# Patient Record
Sex: MALE | Race: WHITE | NOT HISPANIC OR LATINO | Employment: OTHER | URBAN - METROPOLITAN AREA
[De-identification: names, ages, dates, MRNs, and addresses within clinical notes are randomized per-mention and may not be internally consistent; named-entity substitution may affect disease eponyms.]

---

## 2019-11-01 ENCOUNTER — OFFICE VISIT (OUTPATIENT)
Dept: FAMILY MEDICINE | Facility: CLINIC | Age: 79
End: 2019-11-01
Payer: MEDICARE

## 2019-11-01 VITALS
HEIGHT: 65 IN | DIASTOLIC BLOOD PRESSURE: 80 MMHG | WEIGHT: 156.06 LBS | TEMPERATURE: 99 F | HEART RATE: 74 BPM | OXYGEN SATURATION: 98 % | SYSTOLIC BLOOD PRESSURE: 138 MMHG | BODY MASS INDEX: 26 KG/M2

## 2019-11-01 DIAGNOSIS — I25.10 ARTERIOSCLEROSIS OF CORONARY ARTERY: ICD-10-CM

## 2019-11-01 DIAGNOSIS — I10 ESSENTIAL HYPERTENSION: Primary | ICD-10-CM

## 2019-11-01 DIAGNOSIS — G89.29 CHRONIC BILATERAL LOW BACK PAIN WITHOUT SCIATICA: ICD-10-CM

## 2019-11-01 DIAGNOSIS — R41.3 MEMORY LOSS: ICD-10-CM

## 2019-11-01 DIAGNOSIS — F41.9 ANXIETY: ICD-10-CM

## 2019-11-01 DIAGNOSIS — E78.2 MIXED HYPERLIPIDEMIA: ICD-10-CM

## 2019-11-01 DIAGNOSIS — M54.50 CHRONIC BILATERAL LOW BACK PAIN WITHOUT SCIATICA: ICD-10-CM

## 2019-11-01 PROBLEM — M54.9 CHRONIC BILATERAL BACK PAIN: Status: ACTIVE | Noted: 2019-11-01

## 2019-11-01 PROBLEM — E78.5 HYPERLIPIDEMIA: Status: ACTIVE | Noted: 2019-11-01

## 2019-11-01 PROBLEM — C61 CARCINOMA OF PROSTATE: Status: ACTIVE | Noted: 2019-11-01

## 2019-11-01 PROCEDURE — 99214 PR OFFICE/OUTPT VISIT, EST, LEVL IV, 30-39 MIN: ICD-10-PCS | Mod: HCNC,S$GLB,, | Performed by: INTERNAL MEDICINE

## 2019-11-01 PROCEDURE — 99999 PR PBB SHADOW E&M-NEW PATIENT-LVL III: CPT | Mod: PBBFAC,HCNC,, | Performed by: INTERNAL MEDICINE

## 2019-11-01 PROCEDURE — 1101F PT FALLS ASSESS-DOCD LE1/YR: CPT | Mod: HCNC,CPTII,S$GLB, | Performed by: INTERNAL MEDICINE

## 2019-11-01 PROCEDURE — 99214 OFFICE O/P EST MOD 30 MIN: CPT | Mod: HCNC,S$GLB,, | Performed by: INTERNAL MEDICINE

## 2019-11-01 PROCEDURE — 99999 PR PBB SHADOW E&M-NEW PATIENT-LVL III: ICD-10-PCS | Mod: PBBFAC,HCNC,, | Performed by: INTERNAL MEDICINE

## 2019-11-01 PROCEDURE — 1101F PR PT FALLS ASSESS DOC 0-1 FALLS W/OUT INJ PAST YR: ICD-10-PCS | Mod: HCNC,CPTII,S$GLB, | Performed by: INTERNAL MEDICINE

## 2019-11-01 RX ORDER — TAMSULOSIN HYDROCHLORIDE 0.4 MG/1
CAPSULE ORAL
Refills: 5 | COMMUNITY
Start: 2019-10-28 | End: 2020-08-03 | Stop reason: SDUPTHER

## 2019-11-01 RX ORDER — IBUPROFEN 100 MG/5ML
1000 SUSPENSION, ORAL (FINAL DOSE FORM) ORAL
COMMUNITY
Start: 2011-06-23 | End: 2023-06-29 | Stop reason: ALTCHOICE

## 2019-11-01 RX ORDER — ESCITALOPRAM OXALATE 20 MG/1
20 TABLET ORAL DAILY
Refills: 3 | COMMUNITY
Start: 2019-09-14 | End: 2020-06-02 | Stop reason: SDUPTHER

## 2019-11-01 RX ORDER — LORAZEPAM 0.5 MG/1
0.5 TABLET ORAL 2 TIMES DAILY PRN
Qty: 60 TABLET | Refills: 5 | Status: SHIPPED | OUTPATIENT
Start: 2019-11-01 | End: 2020-04-29 | Stop reason: SDUPTHER

## 2019-11-01 RX ORDER — ASPIRIN 81 MG/1
81 TABLET ORAL DAILY
Refills: 0
Start: 2019-11-01 | End: 2021-02-24 | Stop reason: SDUPTHER

## 2019-11-01 RX ORDER — EPINEPHRINE 0.22MG
100 AEROSOL WITH ADAPTER (ML) INHALATION
COMMUNITY
Start: 2013-01-17 | End: 2023-06-29 | Stop reason: ALTCHOICE

## 2019-11-01 RX ORDER — CHOLECALCIFEROL (VITAMIN D3) 25 MCG
1000 TABLET ORAL
COMMUNITY
Start: 2014-01-22 | End: 2021-08-18 | Stop reason: SDUPTHER

## 2019-11-01 RX ORDER — RAMIPRIL 5 MG/1
CAPSULE ORAL
Refills: 3 | COMMUNITY
Start: 2019-09-09 | End: 2021-02-24

## 2019-11-01 RX ORDER — LORAZEPAM 0.5 MG/1
TABLET ORAL
Refills: 0 | COMMUNITY
Start: 2019-09-13 | End: 2019-11-01 | Stop reason: SDUPTHER

## 2019-11-01 RX ORDER — FUROSEMIDE 20 MG/1
TABLET ORAL
Refills: 1 | COMMUNITY
Start: 2019-09-13 | End: 2020-03-18 | Stop reason: SDUPTHER

## 2019-11-01 RX ORDER — BUPROPION HYDROCHLORIDE 200 MG/1
200 TABLET, EXTENDED RELEASE ORAL 2 TIMES DAILY
Refills: 4 | COMMUNITY
Start: 2019-10-28 | End: 2022-03-02 | Stop reason: SDUPTHER

## 2019-11-01 NOTE — ASSESSMENT & PLAN NOTE
Has been having some anxiety lately.  On Wellbutrin 200 mg daily, Lexapro 20 mg daily, Lorazepam PRN.  No SI/HI/panic attacks.

## 2019-11-01 NOTE — ASSESSMENT & PLAN NOTE
On ASA and statin.  No CP/SOB.  Sees Dr. Lawton at [facility].  Cut down to 5 mg on Ramipril by her at last visit 2/2 dizziness.

## 2019-11-01 NOTE — ASSESSMENT & PLAN NOTE
Just had MRI L spine on 10/1/19 with Dr. Jimenez.  Also has some left leg weakness.  Just finished PT which helped some.

## 2019-11-01 NOTE — PROGRESS NOTES
[facility] Note    Chief Complaint      Chief Complaint   Patient presents with    Establish Care     History of Present Illness      Kenyon Hunt is a 79 y.o. male who presents today to establish care for depression, HTN.  Patient comes to appointment with wife, Bryanna.    Problem List Items Addressed This Visit     Essential hypertension - Primary    Current Assessment & Plan     Stable on ramipril 5 mg and lasix 20 mg daily, no CP/SOB/HA         Relevant Orders    CBC auto differential    Comprehensive metabolic panel    Chronic bilateral back pain    Current Assessment & Plan     Just had MRI L spine on 10/1/19 with Dr. Jimenez.  Also has some left leg weakness.  Just finished PT which helped some.          Anxiety    Current Assessment & Plan     Has been having some anxiety lately.  On Wellbutrin 200 mg daily, Lexapro 20 mg daily, Lorazepam PRN.  No SI/HI/panic attacks.         Arteriosclerosis of coronary artery    Current Assessment & Plan     On ASA and statin.  No CP/SOB.  Sees Dr. Lawton at .  Cut down to 5 mg on Ramipril by her at last visit 2/2 dizziness.           Hyperlipidemia    Current Assessment & Plan     Stable on lipitor, no myalgias         Relevant Orders    Lipid panel      Other Visit Diagnoses     Memory loss        Relevant Orders    Ambulatory referral to Neurology          Health Maintenance   Topic Date Due    TETANUS VACCINE  11/01/2020 (Originally 9/29/1958)    Aspirin/Antiplatelet Therapy  11/01/2020    Lipid Panel  01/28/2024    Pneumococcal Vaccine (65+ High/Highest Risk)  Completed       No past medical history on file.    No past surgical history on file.    family history is not on file.     Social History     Tobacco Use    Smoking status: Former Smoker   Substance Use Topics    Alcohol use: Yes    Drug use: Not on file       Review of Systems   Constitutional: Negative for chills and fever.   HENT: Negative for congestion and  sore throat.    Eyes: Negative for blurred vision and discharge.   Respiratory: Negative for cough and shortness of breath.    Cardiovascular: Negative for chest pain and palpitations.   Gastrointestinal: Negative for constipation, diarrhea, nausea and vomiting.   Genitourinary: Negative for dysuria and hematuria.   Musculoskeletal: Negative for falls and myalgias.   Skin: Negative for itching and rash.   Neurological: Negative for dizziness and headaches.        Outpatient Encounter Medications as of 11/1/2019   Medication Sig Dispense Refill    ascorbic acid, vitamin C, (VITAMIN C) 1000 MG tablet Take 1,000 mg by mouth.      atorvastatin calcium (LIPITOR ORAL) Take by mouth.      buPROPion (WELLBUTRIN SR) 200 MG SR12 Take 200 mg by mouth 2 (two) times daily.  4    coenzyme Q10 100 mg capsule Take 100 mg by mouth.      escitalopram oxalate (LEXAPRO) 20 MG tablet Take 20 mg by mouth once daily.  3    furosemide (LASIX) 20 MG tablet TAKE 1 TO 2 TABLETS BY MOUTH ONCE DAILY AS NEEDED  1    LORazepam (ATIVAN) 0.5 MG tablet Take 1 tablet (0.5 mg total) by mouth 2 (two) times daily as needed for Anxiety. 60 tablet 5    MILK THISTLE ORAL Take by mouth.      multivit with minerals/lutein (MULTIVITAMIN 50 PLUS ORAL) Take by mouth.      ramipril (ALTACE) 5 MG capsule TK 1 C PO D  3    tamsulosin (FLOMAX) 0.4 mg Cap TK 1 C PO QD  5    TURMERIC ORAL Take by mouth.      vitamin D (VITAMIN D3) 1000 units Tab Take 1,000 Int'l Units by mouth.      [DISCONTINUED] LORazepam (ATIVAN) 0.5 MG tablet TK 1 T PO BID PRN FOR ANXIETY  0    aspirin (ECOTRIN) 81 MG EC tablet Take 1 tablet (81 mg total) by mouth once daily.  0     No facility-administered encounter medications on file as of 11/1/2019.        Review of patient's allergies indicates:   Allergen Reactions    Carbamazepine        Physical Exam      Vital Signs  Temp: 98.7 °F (37.1 °C)  Temp src: Oral  Pulse: 74  SpO2: 98 %  BP: (!) 146/80  BP Location: Left  "arm  Patient Position: Sitting  Height and Weight  Height: 5' 5" (165.1 cm)  Weight: 70.8 kg (156 lb 1.4 oz)  BSA (Calculated - sq m): 1.8 sq meters  BMI (Calculated): 26  Weight in (lb) to have BMI = 25: 149.9]    Physical Exam   Constitutional: He is oriented to person, place, and time. He appears well-developed and well-nourished.   HENT:   Head: Normocephalic and atraumatic.   Right Ear: External ear normal.   Left Ear: External ear normal.   Eyes: Right eye exhibits no discharge. Left eye exhibits no discharge.   Neck: Normal range of motion. No thyromegaly present.   Cardiovascular: Normal rate, regular rhythm and intact distal pulses.   No murmur heard.  Pulmonary/Chest: Effort normal and breath sounds normal. No respiratory distress.   Abdominal: Soft. Bowel sounds are normal. He exhibits no distension. There is no tenderness.   Musculoskeletal: Normal range of motion. He exhibits no deformity.   Neurological: He is alert and oriented to person, place, and time.   Skin: Skin is warm and dry. No rash noted.   Psychiatric: He has a normal mood and affect. His behavior is normal.        Laboratory:  CBC:  No results for input(s): WBC, RBC, HGB, HCT, PLT, MCV, MCH, MCHC in the last 2160 hours.  CMP:  No results for input(s): GLU, CALCIUM, ALBUMIN, PROT, NA, K, CO2, CL, BUN, ALKPHOS, ALT, AST, BILITOT in the last 2160 hours.    Invalid input(s): CREATININ  URINALYSIS:  No results for input(s): COLORU, CLARITYU, SPECGRAV, PHUR, PROTEINUA, GLUCOSEU, BILIRUBINCON, BLOODU, WBCU, RBCU, BACTERIA, MUCUS, NITRITE, LEUKOCYTESUR, UROBILINOGEN, HYALINECASTS in the last 2160 hours.   LIPIDS:  No results for input(s): TSH, HDL, CHOL, TRIG, LDLCALC, CHOLHDL, NONHDLCHOL, TOTALCHOLEST in the last 2160 hours.  TSH:  No results for input(s): TSH in the last 2160 hours.  A1C:  No results for input(s): HGBA1C in the last 2160 hours.    Radiology:  10/1/19 MRI L spine without contrast at [facility] ordered by Dr. Jimenez:  Lumbar " spondylosis at multiple levels.  Post op changes from L3-S1.  No significant central canal  Stenosis.  Root impingement at L3-L4 on left and L4-S1 bilaterally.    Assessment/Plan     Kenyon Hunt is a 79 y.o.male with:    1. Essential hypertension  - CBC auto differential; Future  - Comprehensive metabolic panel; Future  - CBC auto differential  - Comprehensive metabolic panel    2. Mixed hyperlipidemia  - Lipid panel; Future  - Lipid panel    3. Anxiety    4. Arteriosclerosis of coronary artery    5. Chronic bilateral low back pain without sciatica    6. Memory loss  - Ambulatory referral to Neurology    -labs [facility] after 1/29/19, due for annual labs  -Continue current medications and maintain follow up with specialists.  Return to clinic in 6 months.       Italia Ramírez MD  [facility] [place]

## 2019-11-05 ENCOUNTER — TELEPHONE (OUTPATIENT)
Dept: FAMILY MEDICINE | Facility: CLINIC | Age: 79
End: 2019-11-05

## 2019-12-05 ENCOUNTER — TELEPHONE (OUTPATIENT)
Dept: FAMILY MEDICINE | Facility: CLINIC | Age: 79
End: 2019-12-05

## 2019-12-05 NOTE — TELEPHONE ENCOUNTER
----- Message from Don Carrasco sent at 12/5/2019  1:09 PM CST -----  Contact: [phone] / [facility]  Patient states he is supposed to have an appointment scheduled in January, but it is not showing. Please Advise.

## 2019-12-30 ENCOUNTER — TELEPHONE (OUTPATIENT)
Dept: FAMILY MEDICINE | Facility: CLINIC | Age: 79
End: 2019-12-30

## 2019-12-30 NOTE — TELEPHONE ENCOUNTER
----- Message from Mary Jane Ribeiro sent at 12/30/2019  4:09 PM CST -----  Contact: Sneha honeycutt/ PEDRO [phone]  Sneha is requesting EKG report be faxed to office for pt's upcoming surgery.  [phone]. Please advise.

## 2020-01-14 ENCOUNTER — TELEPHONE (OUTPATIENT)
Dept: FAMILY MEDICINE | Facility: CLINIC | Age: 80
End: 2020-01-14

## 2020-01-14 RX ORDER — FLUTICASONE PROPIONATE 50 MCG
1 SPRAY, SUSPENSION (ML) NASAL DAILY
COMMUNITY
End: 2020-01-14 | Stop reason: SDUPTHER

## 2020-01-14 NOTE — TELEPHONE ENCOUNTER
----- Message from Daxa Becerra sent at 1/14/2020 11:27 AM CST -----  Patient call regarding needing refills on , generic brand of(  Flonase) spray, please call him about this matter , says if have any question # [phone]. Thanks

## 2020-01-15 RX ORDER — FLUTICASONE PROPIONATE 50 MCG
1 SPRAY, SUSPENSION (ML) NASAL DAILY
Qty: 1 BOTTLE | Refills: 3 | Status: SHIPPED | OUTPATIENT
Start: 2020-01-15 | End: 2021-01-22

## 2020-01-15 RX ORDER — FLUTICASONE PROPIONATE 50 MCG
1 SPRAY, SUSPENSION (ML) NASAL DAILY
Qty: 1 BOTTLE | Refills: 3 | Status: SHIPPED | OUTPATIENT
Start: 2020-01-15 | End: 2020-01-15 | Stop reason: SDUPTHER

## 2020-01-15 NOTE — TELEPHONE ENCOUNTER
Received medical clearance for shoulder surgery with Dr. Gay.  Please let patient know that I completed the form and will fax back to Dr. Gay's office.  PLEASE LET HIM KNOW TO HOLD HIS ASPIRIN UNTIL PROCEDURE. (5 DAYS)

## 2020-01-15 NOTE — TELEPHONE ENCOUNTER
----- Message from Rachael Alfaro sent at 1/15/2020 11:05 AM CST -----  Contact: pt  Pt would like to be called back wants medication sent to [facility] mail order [phone]  Pt can be reached at [phone]

## 2020-02-20 DIAGNOSIS — N17.9 AKI (ACUTE KIDNEY INJURY): Primary | ICD-10-CM

## 2020-02-20 LAB
ALBUMIN SERPL-MCNC: 4.3 G/DL (ref 3.6–5.1)
ALBUMIN/GLOB SERPL: 1.7 (CALC) (ref 1–2.5)
ALP SERPL-CCNC: 53 U/L (ref 35–144)
ALT SERPL-CCNC: 24 U/L (ref 9–46)
AST SERPL-CCNC: 18 U/L (ref 10–35)
BASOPHILS # BLD AUTO: 62 CELLS/UL (ref 0–200)
BASOPHILS NFR BLD AUTO: 0.8 %
BILIRUB SERPL-MCNC: 0.4 MG/DL (ref 0.2–1.2)
BUN SERPL-MCNC: 23 MG/DL (ref 7–25)
BUN/CREAT SERPL: 19 (CALC) (ref 6–22)
CALCIUM SERPL-MCNC: 10.1 MG/DL (ref 8.6–10.3)
CHLORIDE SERPL-SCNC: 103 MMOL/L (ref 98–110)
CHOLEST SERPL-MCNC: 176 MG/DL
CHOLEST/HDLC SERPL: 2.3 (CALC)
CO2 SERPL-SCNC: 31 MMOL/L (ref 20–32)
CREAT SERPL-MCNC: 1.2 MG/DL (ref 0.7–1.11)
EOSINOPHIL # BLD AUTO: 367 CELLS/UL (ref 15–500)
EOSINOPHIL NFR BLD AUTO: 4.7 %
ERYTHROCYTE [DISTWIDTH] IN BLOOD BY AUTOMATED COUNT: 11.9 % (ref 11–15)
GFRSERPLBLD MDRD-ARVRAT: 56 ML/MIN/1.73M2
GLOBULIN SER CALC-MCNC: 2.6 G/DL (CALC) (ref 1.9–3.7)
GLUCOSE SERPL-MCNC: 92 MG/DL (ref 65–99)
HCT VFR BLD AUTO: 40.5 % (ref 38.5–50)
HDLC SERPL-MCNC: 78 MG/DL
HGB BLD-MCNC: 13.7 G/DL (ref 13.2–17.1)
LDLC SERPL CALC-MCNC: 83 MG/DL (CALC)
LYMPHOCYTES # BLD AUTO: 1817 CELLS/UL (ref 850–3900)
LYMPHOCYTES NFR BLD AUTO: 23.3 %
MCH RBC QN AUTO: 32.1 PG (ref 27–33)
MCHC RBC AUTO-ENTMCNC: 33.8 G/DL (ref 32–36)
MCV RBC AUTO: 94.8 FL (ref 80–100)
MONOCYTES # BLD AUTO: 601 CELLS/UL (ref 200–950)
MONOCYTES NFR BLD AUTO: 7.7 %
NEUTROPHILS # BLD AUTO: 4953 CELLS/UL (ref 1500–7800)
NEUTROPHILS NFR BLD AUTO: 63.5 %
NONHDLC SERPL-MCNC: 98 MG/DL (CALC)
PLATELET # BLD AUTO: 243 THOUSAND/UL (ref 140–400)
PMV BLD REES-ECKER: 10.5 FL (ref 7.5–12.5)
POTASSIUM SERPL-SCNC: 4.2 MMOL/L (ref 3.5–5.3)
PROT SERPL-MCNC: 6.9 G/DL (ref 6.1–8.1)
RBC # BLD AUTO: 4.27 MILLION/UL (ref 4.2–5.8)
SODIUM SERPL-SCNC: 141 MMOL/L (ref 135–146)
TRIGL SERPL-MCNC: 71 MG/DL
WBC # BLD AUTO: 7.8 THOUSAND/UL (ref 3.8–10.8)

## 2020-02-20 NOTE — PROGRESS NOTES
Labs look great but kidney function is lower compared to last labs at .  Would like to repeat in 1 week to see if this is a trend or if it has improved.  In the meantime, drink lots of water.  Will send order to [facility].

## 2020-02-21 ENCOUNTER — TELEPHONE (OUTPATIENT)
Dept: FAMILY MEDICINE | Facility: CLINIC | Age: 80
End: 2020-02-21

## 2020-02-21 NOTE — TELEPHONE ENCOUNTER
Spoke with patient. Informed patient of recent lab results and Dr Ramírez's recommendations. Pt verbalizes understanding.

## 2020-02-26 ENCOUNTER — TELEPHONE (OUTPATIENT)
Dept: FAMILY MEDICINE | Facility: CLINIC | Age: 80
End: 2020-02-26

## 2020-02-26 NOTE — TELEPHONE ENCOUNTER
----- Message from Inna Farr sent at 2/26/2020  9:15 AM CST -----  Contact: Patient  Patient called requesting information on orders for blood work that he was advised to retake for kidney functioning. Pt would like to know if the labs are fasting. If possible, can you please advise patient? Pt stated he can be reached on [phone].  
Lm letting pt know he does not have to fast for bloodwork  
no

## 2020-02-27 ENCOUNTER — TELEPHONE (OUTPATIENT)
Dept: FAMILY MEDICINE | Facility: CLINIC | Age: 80
End: 2020-02-27

## 2020-02-27 LAB
ALBUMIN SERPL-MCNC: 4.1 G/DL (ref 3.6–5.1)
ALBUMIN/GLOB SERPL: 1.5 (CALC) (ref 1–2.5)
ALP SERPL-CCNC: 54 U/L (ref 35–144)
ALT SERPL-CCNC: 17 U/L (ref 9–46)
AST SERPL-CCNC: 15 U/L (ref 10–35)
BILIRUB SERPL-MCNC: 0.4 MG/DL (ref 0.2–1.2)
BUN SERPL-MCNC: 23 MG/DL (ref 7–25)
BUN/CREAT SERPL: 20 (CALC) (ref 6–22)
CALCIUM SERPL-MCNC: 9.7 MG/DL (ref 8.6–10.3)
CHLORIDE SERPL-SCNC: 103 MMOL/L (ref 98–110)
CO2 SERPL-SCNC: 28 MMOL/L (ref 20–32)
CREAT SERPL-MCNC: 1.14 MG/DL (ref 0.7–1.11)
GFRSERPLBLD MDRD-ARVRAT: 60 ML/MIN/1.73M2
GLOBULIN SER CALC-MCNC: 2.7 G/DL (CALC) (ref 1.9–3.7)
GLUCOSE SERPL-MCNC: 76 MG/DL (ref 65–139)
POTASSIUM SERPL-SCNC: 4.3 MMOL/L (ref 3.5–5.3)
PROT SERPL-MCNC: 6.8 G/DL (ref 6.1–8.1)
SODIUM SERPL-SCNC: 140 MMOL/L (ref 135–146)

## 2020-02-27 NOTE — TELEPHONE ENCOUNTER
----- Message from Italia Ramírez MD sent at 2/27/2020 11:32 AM CST -----  Kidney function slightly improved from before.  Will repeat at next visit.  Be sure to continue drinking lots of water

## 2020-02-27 NOTE — PROGRESS NOTES
Kidney function slightly improved from before.  Will repeat at next visit.  Be sure to continue drinking lots of water

## 2020-03-18 ENCOUNTER — TELEPHONE (OUTPATIENT)
Dept: FAMILY MEDICINE | Facility: CLINIC | Age: 80
End: 2020-03-18

## 2020-03-18 RX ORDER — FUROSEMIDE 20 MG/1
20 TABLET ORAL DAILY
Qty: 90 TABLET | Refills: 1 | Status: SHIPPED | OUTPATIENT
Start: 2020-03-18 | End: 2020-08-03

## 2020-03-18 RX ORDER — ATORVASTATIN CALCIUM 10 MG/1
10 TABLET, FILM COATED ORAL DAILY
Qty: 90 TABLET | Refills: 1 | Status: SHIPPED | OUTPATIENT
Start: 2020-03-18 | End: 2021-02-22 | Stop reason: SDUPTHER

## 2020-03-18 NOTE — TELEPHONE ENCOUNTER
----- Message from Felicita Burgos sent at 3/18/2020 11:48 AM CDT -----  Contact: [phone]  Type: Rx    Name of medication(s): furosemide (LASIX) 20 MG tablet,atorvastatin calcium (LIPITOR ORAL     Is this a refill? New rx? Refill (90) day supply    Who prescribed medication? Dr. Italia Ramírez    Pharmacy Name, Phone, & Location[id number] [place] [address]     Comments:    Pleased advise, thank you

## 2020-03-18 NOTE — TELEPHONE ENCOUNTER
----- Message from Raquel Talbot sent at 3/18/2020  1:24 PM CDT -----  Pt asked for another call back.      Thanks

## 2020-04-29 RX ORDER — LORAZEPAM 0.5 MG/1
0.5 TABLET ORAL 2 TIMES DAILY PRN
Qty: 60 TABLET | Refills: 5 | Status: SHIPPED | OUTPATIENT
Start: 2020-04-29 | End: 2020-11-16

## 2020-05-04 ENCOUNTER — OFFICE VISIT (OUTPATIENT)
Dept: FAMILY MEDICINE | Facility: CLINIC | Age: 80
End: 2020-05-04
Payer: MEDICARE

## 2020-05-04 ENCOUNTER — TELEPHONE (OUTPATIENT)
Dept: FAMILY MEDICINE | Facility: CLINIC | Age: 80
End: 2020-05-04

## 2020-05-04 DIAGNOSIS — F41.9 ANXIETY: ICD-10-CM

## 2020-05-04 DIAGNOSIS — I10 ESSENTIAL HYPERTENSION: ICD-10-CM

## 2020-05-04 DIAGNOSIS — C61 CARCINOMA OF PROSTATE: ICD-10-CM

## 2020-05-04 DIAGNOSIS — E78.2 MIXED HYPERLIPIDEMIA: ICD-10-CM

## 2020-05-04 DIAGNOSIS — M54.50 CHRONIC BILATERAL LOW BACK PAIN WITHOUT SCIATICA: ICD-10-CM
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
## 2020-05-04 DIAGNOSIS — I25.10 ARTERIOSCLEROSIS OF CORONARY ARTERY: Primary | ICD-10-CM

## 2020-05-04 DIAGNOSIS — G89.29 CHRONIC BILATERAL LOW BACK PAIN WITHOUT SCIATICA: ICD-10-CM

## 2020-05-04 PROBLEM — R29.6 FALLS: Status: ACTIVE | Noted: 2020-05-04
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
## 2020-05-04 PROCEDURE — 99441 PR PHYSICIAN TELEPHONE EVALUATION 5-10 MIN: ICD-10-PCS | Mod: HCNC,95,, | Performed by: INTERNAL MEDICINE

## 2020-05-04 PROCEDURE — 99441 PR PHYSICIAN TELEPHONE EVALUATION 5-10 MIN: CPT | Mod: HCNC,95,, | Performed by: INTERNAL MEDICINE

## 2020-05-04 NOTE — ASSESSMENT & PLAN NOTE
Stable on Wellbutrin 200 mg daily, Lexapro 20 mg daily, Lorazepam PRN.  No SI/HI/panic attacks.  Anxiety has been worse since COVID 19 started.   Takes ativan most nights, occasionally takes during the day.

## 2020-05-04 NOTE — TELEPHONE ENCOUNTER
----- Message from Felicita Burgos sent at 5/4/2020  1:18 PM CDT -----  Contact: [phone]  Patient is requesting a call from the office regarding his appt.for 1:30 pm.  Please advsie, thank you

## 2020-05-04 NOTE — PROGRESS NOTES
Established Patient - Audio Only Telehealth Visit     The patient location is: home  The chief complaint leading to consultation is: follow up HTN, anxiety  Visit type: Virtual visit with audio only (telephone)  Total time spent with patient: 8 minutes    The reason for the audio only service rather than synchronous audio and video virtual visit was related to technical difficulties or patient preference/necessity.     Each patient to whom I provide medical services by telemedicine is:  (1) informed of the relationship between the physician and patient and the respective role of any other health care provider with respect to management of the patient; and (2) notified that they may decline to receive medical services by telemedicine and may withdraw from such care at any time. Patient verbally consented to receive this service via voice-only telephone call.     This service was not originating from a related E/M service provided within the previous 7 days nor will  to an E/M service or procedure within the next 24 hours or my soonest available appointment.  Prevailing standard of care was able to be met in this audio-only visit.      [facility] Note    Chief Complaint      Chief Complaint   Patient presents with    Hyperlipidemia    Hypertension     History of Present Illness      Kenyon Hunt is a 79 y.o. male who presents today for follow up of depression, HTN.  Patient comes to appointment via virutal visit.    Problem List Items Addressed This Visit     Essential hypertension    Current Assessment & Plan     Stable on ramipril 5 mg and lasix 20 mg daily, no CP/SOB/HA         Relevant Orders    CBC auto differential    Comprehensive metabolic panel    Chronic bilateral back pain    Current Assessment & Plan     Just had MRI L spine on 10/1/19 with Dr. Jimenez.  Also has some left leg weakness. Pending rizotomy once COVID 19 improves.         Anxiety    Current  Assessment & Plan     Stable on Wellbutrin 200 mg daily, Lexapro 20 mg daily, Lorazepam PRN.  No SI/HI/panic attacks.  Anxiety has been worse since COVID 19 started.   Takes ativan most nights, occasionally takes during the day.         Arteriosclerosis of coronary artery - Primary    Current Assessment & Plan     On ASA and statin.  No CP/SOB.  Sees Dr. Lawton at .  Cut down to 5 mg on Ramipril by her at last visit 2/2 dizziness.           Relevant Orders    Lipid Panel    Carcinoma of prostate    Hyperlipidemia    Current Assessment & Plan     Stable on lipitor, no myalgias         Falls    Current Assessment & Plan     None recently.  Sees Dr. Bartlett who checked inner ears, did MRI brain which showed some white matter changes of brain related to aging.  Dr. Jimenez thinks his falls are related to his BLE weakness from back issues.               Health Maintenance   Topic Date Due    TETANUS VACCINE  11/01/2020 (Originally 9/29/1958)    Aspirin/Antiplatelet Therapy  11/01/2020    Lipid Panel  02/19/2025    Pneumococcal Vaccine (65+ High/Highest Risk)  Completed       No past medical history on file.    No past surgical history on file.    family history is not on file.     Social History     Tobacco Use    Smoking status: Former Smoker   Substance Use Topics    Alcohol use: Yes    Drug use: Not on file       Review of Systems   Constitutional: Negative for chills and fever.   HENT: Negative for congestion and sore throat.    Eyes: Negative for blurred vision and discharge.   Respiratory: Negative for cough and shortness of breath.    Cardiovascular: Negative for chest pain and palpitations.   Gastrointestinal: Negative for constipation, diarrhea, nausea and vomiting.   Genitourinary: Negative for dysuria and hematuria.   Musculoskeletal: Negative for falls and myalgias.   Skin: Negative for itching and rash.   Neurological: Negative for dizziness and headaches.        Outpatient Encounter Medications  as of 5/4/2020   Medication Sig Dispense Refill    ascorbic acid, vitamin C, (VITAMIN C) 1000 MG tablet Take 1,000 mg by mouth.      aspirin (ECOTRIN) 81 MG EC tablet Take 1 tablet (81 mg total) by mouth once daily.  0    atorvastatin (LIPITOR) 10 MG tablet Take 1 tablet (10 mg total) by mouth once daily. 90 tablet 1    buPROPion (WELLBUTRIN SR) 200 MG SR12 Take 200 mg by mouth 2 (two) times daily.  4    coenzyme Q10 100 mg capsule Take 100 mg by mouth.      escitalopram oxalate (LEXAPRO) 20 MG tablet Take 20 mg by mouth once daily.  3    fluticasone propionate (FLONASE) 50 mcg/actuation nasal spray 1 spray (50 mcg total) by Each Nostril route once daily. 1 Bottle 3    furosemide (LASIX) 20 MG tablet Take 1 tablet (20 mg total) by mouth once daily. 90 tablet 1    LORazepam (ATIVAN) 0.5 MG tablet Take 1 tablet (0.5 mg total) by mouth 2 (two) times daily as needed for Anxiety. 60 tablet 5    MILK THISTLE ORAL Take by mouth.      multivit with minerals/lutein (MULTIVITAMIN 50 PLUS ORAL) Take by mouth.      ramipril (ALTACE) 5 MG capsule TK 1 C PO D  3    tamsulosin (FLOMAX) 0.4 mg Cap TK 1 C PO QD  5    TURMERIC ORAL Take by mouth.      vitamin D (VITAMIN D3) 1000 units Tab Take 1,000 Int'l Units by mouth.       No facility-administered encounter medications on file as of 5/4/2020.        Review of patient's allergies indicates:   Allergen Reactions    Carbamazepine        Physical Exam       ]No vital signs taken by me as this is a virtual visit.  Any reported are patient's home measurements.      Physical Exam   Constitutional: He is oriented to person, place, and time. No distress.   Pulmonary/Chest: No respiratory distress.   Neurological: He is alert and oriented to person, place, and time.   Psychiatric: He has a normal mood and affect. His behavior is normal. Judgment and thought content normal.        Laboratory:  CBC:  Recent Labs   Lab Result Units 02/19/20  0851   WBC Thousand/uL 7.8   RBC  Million/uL 4.27   Hemoglobin g/dL 13.7   Hematocrit % 40.5   Platelets Thousand/uL 243   Mean Corpuscular Volume fL 94.8   Mean Corpuscular Hemoglobin pg 32.1   Mean Corpuscular Hemoglobin Conc g/dL 33.8     CMP:  Recent Labs   Lab Result Units 02/19/20  0851 02/26/20  1254   Glucose mg/dL 92 76   Calcium mg/dL 10.1 9.7   Albumin g/dL 4.3 4.1   Total Protein g/dL 6.9 6.8   Sodium mmol/L 141 140   Potassium mmol/L 4.2 4.3   CO2 mmol/L 31 28   Chloride mmol/L 103 103   BUN, Bld mg/dL 23 23   Alkaline Phosphatase U/L 53 54   ALT U/L 24 17   AST U/L 18 15   Total Bilirubin mg/dL 0.4 0.4     URINALYSIS:  No results for input(s): COLORU, CLARITYU, SPECGRAV, PHUR, PROTEINUA, GLUCOSEU, BILIRUBINCON, BLOODU, WBCU, RBCU, BACTERIA, MUCUS, NITRITE, LEUKOCYTESUR, UROBILINOGEN, HYALINECASTS in the last 2160 hours.   LIPIDS:  Recent Labs   Lab Result Units 02/19/20  0851   HDL mg/dL 78   Cholesterol mg/dL 176   Triglycerides mg/dL 71   LDL Cholesterol mg/dL (calc) 83   Hdl/Cholesterol Ratio (calc) 2.3   Non HDL Chol. (LDL+VLDL) mg/dL (calc) 98     TSH:  No results for input(s): TSH in the last 2160 hours.  A1C:  No results for input(s): HGBA1C in the last 2160 hours.    Radiology:  No new imaging on file    Assessment/Plan     Kenyon Hunt is a 79 y.o.male with:    1. Arteriosclerosis of coronary artery  - Lipid Panel; Future  - Lipid Panel    2. Essential hypertension  - CBC auto differential; Future  - Comprehensive metabolic panel; Future  - CBC auto differential  - Comprehensive metabolic panel    3. Carcinoma of prostate    4. Anxiety    5. Mixed hyperlipidemia    6. Fall, sequela    7. Chronic bilateral low back pain without sciatica    -Continue current medications and maintain follow up with specialists.  Return to clinic in 8/2020 with labs prior at [facility] per patient request       Italia Ramírez MD  [facility] [place]

## 2020-05-04 NOTE — ASSESSMENT & PLAN NOTE
Just had MRI L spine on 10/1/19 with Dr. Jimenez.  Also has some left leg weakness. Pending rizotomy once COVID 19 improves.

## 2020-05-04 NOTE — ASSESSMENT & PLAN NOTE
None recently.  Sees Dr. Bartlett who checked inner ears, did MRI brain which showed some white matter changes of brain related to aging.  Dr. Jimenez thinks his falls are related to his BLE weakness from back issues.

## 2020-05-15 ENCOUNTER — TELEPHONE (OUTPATIENT)
Dept: FAMILY MEDICINE | Facility: CLINIC | Age: 80
End: 2020-05-15

## 2020-05-15 DIAGNOSIS — R42 DIZZINESS: Primary | ICD-10-CM

## 2020-05-15 NOTE — TELEPHONE ENCOUNTER
----- Message from Breanne Calderon sent at 5/15/2020  2:21 PM CDT -----  Contact: self/[phone]  Pt states can he have a referral to neurologist in the [facility] system. Please call and advise.

## 2020-05-15 NOTE — TELEPHONE ENCOUNTER
Patient states he will like to be referred to a neuro through the [facility] for balance issues and dizziness /patient states he see an neuro at  but will like to see one at [facility] instead

## 2020-06-02 RX ORDER — ESCITALOPRAM OXALATE 20 MG/1
20 TABLET ORAL DAILY
Qty: 90 TABLET | Refills: 3 | Status: SHIPPED | OUTPATIENT
Start: 2020-06-02 | End: 2020-11-16

## 2020-06-02 NOTE — TELEPHONE ENCOUNTER
----- Message from Rhett Perez sent at 6/2/2020 12:08 PM CDT -----  Contact: Patient [phone]  RX request - refill or new RX.  Is this a refill or new RX:  Refill  RX name and strength: escitalopram oxalate (LEXAPRO) 20 MG tablet  Directions:    Is this a 30 day or 90 day RX:    Pharmacy name and phone # [id number] - [place], [place] - [address] AT [facility] [address] [phone] (Phone)  [phone] (Fax)    Comments:  Call to inform has been sent, will be out of Rx in 10 to 12 Days.    Please call an advise  Thank you

## 2020-07-30 ENCOUNTER — TELEPHONE (OUTPATIENT)
Dept: FAMILY MEDICINE | Facility: CLINIC | Age: 80
End: 2020-07-30

## 2020-07-30 NOTE — TELEPHONE ENCOUNTER
----- Message from Yessi Terry sent at 7/30/2020  3:45 PM CDT -----  Contact: self [phone]  Pt is calling to see if orders for his lab work were sent over to [facility]. Please call and advise.

## 2020-08-01 LAB
ALBUMIN SERPL-MCNC: 4.1 G/DL (ref 3.6–5.1)
ALBUMIN/GLOB SERPL: 1.5 (CALC) (ref 1–2.5)
ALP SERPL-CCNC: 51 U/L (ref 35–144)
ALT SERPL-CCNC: 16 U/L (ref 9–46)
AST SERPL-CCNC: 18 U/L (ref 10–35)
BASOPHILS # BLD AUTO: 52 CELLS/UL (ref 0–200)
BASOPHILS NFR BLD AUTO: 0.8 %
BILIRUB SERPL-MCNC: 0.3 MG/DL (ref 0.2–1.2)
BUN SERPL-MCNC: 19 MG/DL (ref 7–25)
BUN/CREAT SERPL: NORMAL (CALC) (ref 6–22)
CALCIUM SERPL-MCNC: 9.8 MG/DL (ref 8.6–10.3)
CHLORIDE SERPL-SCNC: 103 MMOL/L (ref 98–110)
CHOLEST SERPL-MCNC: 163 MG/DL
CHOLEST/HDLC SERPL: 2.1 (CALC)
CO2 SERPL-SCNC: 28 MMOL/L (ref 20–32)
CREAT SERPL-MCNC: 0.99 MG/DL (ref 0.7–1.11)
EOSINOPHIL # BLD AUTO: 481 CELLS/UL (ref 15–500)
EOSINOPHIL NFR BLD AUTO: 7.4 %
ERYTHROCYTE [DISTWIDTH] IN BLOOD BY AUTOMATED COUNT: 13.6 % (ref 11–15)
GFRSERPLBLD MDRD-ARVRAT: 71 ML/MIN/1.73M2
GLOBULIN SER CALC-MCNC: 2.8 G/DL (CALC) (ref 1.9–3.7)
GLUCOSE SERPL-MCNC: 90 MG/DL (ref 65–99)
HCT VFR BLD AUTO: 40.8 % (ref 38.5–50)
HDLC SERPL-MCNC: 76 MG/DL
HGB BLD-MCNC: 14 G/DL (ref 13.2–17.1)
LDLC SERPL CALC-MCNC: 72 MG/DL (CALC)
LYMPHOCYTES # BLD AUTO: 1729 CELLS/UL (ref 850–3900)
LYMPHOCYTES NFR BLD AUTO: 26.6 %
MCH RBC QN AUTO: 32.6 PG (ref 27–33)
MCHC RBC AUTO-ENTMCNC: 34.3 G/DL (ref 32–36)
MCV RBC AUTO: 95.1 FL (ref 80–100)
MONOCYTES # BLD AUTO: 533 CELLS/UL (ref 200–950)
MONOCYTES NFR BLD AUTO: 8.2 %
NEUTROPHILS # BLD AUTO: 3705 CELLS/UL (ref 1500–7800)
NEUTROPHILS NFR BLD AUTO: 57 %
NONHDLC SERPL-MCNC: 87 MG/DL (CALC)
PLATELET # BLD AUTO: 200 THOUSAND/UL (ref 140–400)
PMV BLD REES-ECKER: 10.4 FL (ref 7.5–12.5)
POTASSIUM SERPL-SCNC: 4.4 MMOL/L (ref 3.5–5.3)
PROT SERPL-MCNC: 6.9 G/DL (ref 6.1–8.1)
RBC # BLD AUTO: 4.29 MILLION/UL (ref 4.2–5.8)
SODIUM SERPL-SCNC: 140 MMOL/L (ref 135–146)
TRIGL SERPL-MCNC: 70 MG/DL
WBC # BLD AUTO: 6.5 THOUSAND/UL (ref 3.8–10.8)

## 2020-08-03 ENCOUNTER — OFFICE VISIT (OUTPATIENT)
Dept: FAMILY MEDICINE | Facility: CLINIC | Age: 80
End: 2020-08-03
Payer: MEDICARE

## 2020-08-03 VITALS
TEMPERATURE: 98 F | HEART RATE: 66 BPM | BODY MASS INDEX: 26.29 KG/M2 | DIASTOLIC BLOOD PRESSURE: 82 MMHG | OXYGEN SATURATION: 96 % | WEIGHT: 157.94 LBS | SYSTOLIC BLOOD PRESSURE: 124 MMHG

## 2020-08-03 DIAGNOSIS — M54.50 CHRONIC BILATERAL LOW BACK PAIN WITHOUT SCIATICA: Primary | ICD-10-CM

## 2020-08-03 DIAGNOSIS — R20.0 NUMBNESS IN FEET: ICD-10-CM

## 2020-08-03 DIAGNOSIS — I25.10 ARTERIOSCLEROSIS OF CORONARY ARTERY: ICD-10-CM

## 2020-08-03 DIAGNOSIS — E78.2 MIXED HYPERLIPIDEMIA: ICD-10-CM

## 2020-08-03 DIAGNOSIS — I10 ESSENTIAL HYPERTENSION: ICD-10-CM
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
## 2020-08-03 DIAGNOSIS — F41.9 ANXIETY: ICD-10-CM

## 2020-08-03 DIAGNOSIS — G89.29 CHRONIC BILATERAL LOW BACK PAIN WITHOUT SCIATICA: Primary | ICD-10-CM

## 2020-08-03 PROCEDURE — 3074F PR MOST RECENT SYSTOLIC BLOOD PRESSURE < 130 MM HG: ICD-10-PCS | Mod: HCNC,CPTII,S$GLB, | Performed by: INTERNAL MEDICINE

## 2020-08-03 PROCEDURE — 1125F PR PAIN SEVERITY QUANTIFIED, PAIN PRESENT: ICD-10-PCS | Mod: HCNC,S$GLB,, | Performed by: INTERNAL MEDICINE

## 2020-08-03 PROCEDURE — 99214 OFFICE O/P EST MOD 30 MIN: CPT | Mod: HCNC,S$GLB,, | Performed by: INTERNAL MEDICINE

## 2020-08-03 PROCEDURE — 3079F DIAST BP 80-89 MM HG: CPT | Mod: HCNC,CPTII,S$GLB, | Performed by: INTERNAL MEDICINE

## 2020-08-03 PROCEDURE — 99214 PR OFFICE/OUTPT VISIT, EST, LEVL IV, 30-39 MIN: ICD-10-PCS | Mod: HCNC,S$GLB,, | Performed by: INTERNAL MEDICINE

## 2020-08-03 PROCEDURE — 99999 PR PBB SHADOW E&M-EST. PATIENT-LVL III: CPT | Mod: PBBFAC,HCNC,, | Performed by: INTERNAL MEDICINE

## 2020-08-03 PROCEDURE — 1101F PT FALLS ASSESS-DOCD LE1/YR: CPT | Mod: HCNC,CPTII,S$GLB, | Performed by: INTERNAL MEDICINE

## 2020-08-03 PROCEDURE — 99499 UNLISTED E&M SERVICE: CPT | Mod: S$GLB,,, | Performed by: INTERNAL MEDICINE

## 2020-08-03 PROCEDURE — 1159F PR MEDICATION LIST DOCUMENTED IN MEDICAL RECORD: ICD-10-PCS | Mod: HCNC,S$GLB,, | Performed by: INTERNAL MEDICINE

## 2020-08-03 PROCEDURE — 1159F MED LIST DOCD IN RCRD: CPT | Mod: HCNC,S$GLB,, | Performed by: INTERNAL MEDICINE

## 2020-08-03 PROCEDURE — 3074F SYST BP LT 130 MM HG: CPT | Mod: HCNC,CPTII,S$GLB, | Performed by: INTERNAL MEDICINE

## 2020-08-03 PROCEDURE — 1125F AMNT PAIN NOTED PAIN PRSNT: CPT | Mod: HCNC,S$GLB,, | Performed by: INTERNAL MEDICINE

## 2020-08-03 PROCEDURE — 99999 PR PBB SHADOW E&M-EST. PATIENT-LVL III: ICD-10-PCS | Mod: PBBFAC,HCNC,, | Performed by: INTERNAL MEDICINE

## 2020-08-03 PROCEDURE — 99499 RISK ADDL DX/OHS AUDIT: ICD-10-PCS | Mod: S$GLB,,, | Performed by: INTERNAL MEDICINE

## 2020-08-03 PROCEDURE — 1101F PR PT FALLS ASSESS DOC 0-1 FALLS W/OUT INJ PAST YR: ICD-10-PCS | Mod: HCNC,CPTII,S$GLB, | Performed by: INTERNAL MEDICINE

## 2020-08-03 PROCEDURE — 3079F PR MOST RECENT DIASTOLIC BLOOD PRESSURE 80-89 MM HG: ICD-10-PCS | Mod: HCNC,CPTII,S$GLB, | Performed by: INTERNAL MEDICINE

## 2020-08-03 RX ORDER — TAMSULOSIN HYDROCHLORIDE 0.4 MG/1
0.4 CAPSULE ORAL DAILY
Qty: 90 CAPSULE | Refills: 3 | Status: SHIPPED | OUTPATIENT
Start: 2020-08-03 | End: 2021-08-08 | Stop reason: SDUPTHER

## 2020-08-03 NOTE — PROGRESS NOTES
[facility] Note    Chief Complaint      Chief Complaint   Patient presents with    Follow-up    Anxiety     History of Present Illness      Kenyon Hunt is a 79 y.o. male who presents today for follow up of depression, HTN.  Patient comes to appointment with wife, Bryanna.    Problem List Items Addressed This Visit     Essential hypertension    Current Assessment & Plan     Stable on ramipril 5 mg, no CP/SOB/HA.  No longer on Lasix.         Chronic bilateral back pain - Primary    Current Assessment & Plan     Last MRI L spine on 10/1/19 with Dr. Jimenez.  Also has some left leg weakness, did PT which helped some in past.         Anxiety    Current Assessment & Plan     Stable on Wellbutrin 200 mg BID, Lexapro 20 mg daily, Lorazepam PRN.  No SI/HI/panic attacks.  Anxiety has been worse since COVID 19 started.  Gets down because he can't do what he used to do.   Takes ativan most nights, occasionally takes during the day.         Arteriosclerosis of coronary artery    Current Assessment & Plan     On ASA and statin.  No CP/SOB.  Sees Dr. Lawton at .           Hyperlipidemia    Current Assessment & Plan     Stable on lipitor, no myalgias         Relevant Orders    CBC auto differential    Lipid Panel    Comprehensive metabolic panel    Falls    Current Assessment & Plan     Sees Dr. Bartlett who checked inner ears, did MRI brain which showed some white matter changes of brain related to aging.  Dr. Jimenez thinks his falls are related to his BLE weakness from back issues.  Has rizotomy scheduled with Dr. Jimenez next week.  Having lots of pain.  Has difficulty with walking because foot are numb. Dr. Andrade did nerve conduction study of LLE which is pending at this time.         Numbness in feet          Health Maintenance   Topic Date Due    TETANUS VACCINE  11/01/2020 (Originally 9/29/1958)    Aspirin/Antiplatelet Therapy  08/03/2021    Lipid Panel  07/31/2025     Pneumococcal Vaccine (65+ High/Highest Risk)  Completed       History reviewed. No pertinent past medical history.    Past Surgical History:   Procedure Laterality Date    SHOULDER SURGERY         family history is not on file.     Social History     Tobacco Use    Smoking status: Former Smoker   Substance Use Topics    Alcohol use: Yes    Drug use: Not on file       Review of Systems   Constitutional: Negative for chills and fever.   HENT: Negative for congestion and sore throat.    Eyes: Negative for blurred vision and discharge.   Respiratory: Negative for cough and shortness of breath.    Cardiovascular: Negative for chest pain and palpitations.   Gastrointestinal: Negative for constipation, diarrhea, nausea and vomiting.   Genitourinary: Negative for dysuria and hematuria.   Musculoskeletal: Negative for falls and myalgias.   Skin: Negative for itching and rash.   Neurological: Negative for dizziness and headaches.        Outpatient Encounter Medications as of 8/3/2020   Medication Sig Dispense Refill    ascorbic acid, vitamin C, (VITAMIN C) 1000 MG tablet Take 1,000 mg by mouth.      aspirin (ECOTRIN) 81 MG EC tablet Take 1 tablet (81 mg total) by mouth once daily.  0    atorvastatin (LIPITOR) 10 MG tablet Take 1 tablet (10 mg total) by mouth once daily. 90 tablet 1    buPROPion (WELLBUTRIN SR) 200 MG SR12 Take 200 mg by mouth 2 (two) times daily.  4    coenzyme Q10 100 mg capsule Take 100 mg by mouth.      escitalopram oxalate (LEXAPRO) 20 MG tablet Take 1 tablet (20 mg total) by mouth once daily. 90 tablet 3    fluticasone propionate (FLONASE) 50 mcg/actuation nasal spray 1 spray (50 mcg total) by Each Nostril route once daily. 1 Bottle 3    LORazepam (ATIVAN) 0.5 MG tablet Take 1 tablet (0.5 mg total) by mouth 2 (two) times daily as needed for Anxiety. 60 tablet 5    MILK THISTLE ORAL Take by mouth.      multivit with minerals/lutein (MULTIVITAMIN 50 PLUS ORAL) Take by mouth.      ramipril  (ALTACE) 5 MG capsule TK 1 C PO D  3    tamsulosin (FLOMAX) 0.4 mg Cap TK 1 C PO QD  5    TURMERIC ORAL Take by mouth.      vitamin D (VITAMIN D3) 1000 units Tab Take 1,000 Int'l Units by mouth.      [DISCONTINUED] furosemide (LASIX) 20 MG tablet Take 1 tablet (20 mg total) by mouth once daily. 90 tablet 1     No facility-administered encounter medications on file as of 8/3/2020.        Review of patient's allergies indicates:   Allergen Reactions    Carbamazepine        Physical Exam      Vital Signs  Temp: 97.6 °F (36.4 °C)  Temp src: Oral  Pulse: 66  SpO2: 96 %  BP: 124/82  BP Location: Left arm  Patient Position: Sitting  Pain Score:   8  Pain Loc: Back  Height and Weight  Weight: 71.6 kg (157 lb 15.4 oz)]  Body mass index is 26.29 kg/m².    Physical Exam  Constitutional:       Appearance: He is well-developed.   HENT:      Head: Normocephalic and atraumatic.      Right Ear: External ear normal.      Left Ear: External ear normal.   Eyes:      General:         Right eye: No discharge.         Left eye: No discharge.   Neck:      Musculoskeletal: Normal range of motion.      Thyroid: No thyromegaly.   Cardiovascular:      Rate and Rhythm: Normal rate and regular rhythm.      Heart sounds: No murmur.   Pulmonary:      Effort: Pulmonary effort is normal. No respiratory distress.      Breath sounds: Normal breath sounds.   Abdominal:      General: Bowel sounds are normal. There is no distension.      Palpations: Abdomen is soft.      Tenderness: There is no abdominal tenderness.   Musculoskeletal: Normal range of motion.         General: No deformity.   Skin:     General: Skin is warm and dry.      Findings: No rash.   Neurological:      Mental Status: He is alert and oriented to person, place, and time.   Psychiatric:         Behavior: Behavior normal.          Laboratory:  CBC:  Recent Labs   Lab Result Units 07/31/20  0834   WBC Thousand/uL 6.5   RBC Million/uL 4.29   Hemoglobin g/dL 14.0   Hematocrit % 40.8    Platelets Thousand/uL 200   Mean Corpuscular Volume fL 95.1   Mean Corpuscular Hemoglobin pg 32.6   Mean Corpuscular Hemoglobin Conc g/dL 34.3     CMP:  Recent Labs   Lab Result Units 07/31/20  0834   Glucose mg/dL 90   Calcium mg/dL 9.8   Albumin g/dL 4.1   Total Protein g/dL 6.9   Sodium mmol/L 140   Potassium mmol/L 4.4   CO2 mmol/L 28   Chloride mmol/L 103   BUN, Bld mg/dL 19   Alkaline Phosphatase U/L 51   ALT U/L 16   AST U/L 18   Total Bilirubin mg/dL 0.3     URINALYSIS:  No results for input(s): COLORU, CLARITYU, SPECGRAV, PHUR, PROTEINUA, GLUCOSEU, BILIRUBINCON, BLOODU, WBCU, RBCU, BACTERIA, MUCUS, NITRITE, LEUKOCYTESUR, UROBILINOGEN, HYALINECASTS in the last 2160 hours.   LIPIDS:  Recent Labs   Lab Result Units 07/31/20  0834   HDL mg/dL 76   Cholesterol mg/dL 163   Triglycerides mg/dL 70   LDL Cholesterol mg/dL (calc) 72   Hdl/Cholesterol Ratio (calc) 2.1   Non HDL Chol. (LDL+VLDL) mg/dL (calc) 87     TSH:  No results for input(s): TSH in the last 2160 hours.  A1C:  No results for input(s): HGBA1C in the last 2160 hours.    Radiology:  No new imaging on file    Assessment/Plan     Kenyon Hunt is a 79 y.o.male with:    1. Chronic bilateral low back pain without sciatica    2. Numbness in feet    3. Fall, sequela    4. Mixed hyperlipidemia  - CBC auto differential; Future  - Lipid Panel; Future  - Comprehensive metabolic panel; Future  - CBC auto differential  - Lipid Panel  - Comprehensive metabolic panel    5. Essential hypertension    6. Anxiety    7. Arteriosclerosis of coronary artery     -counseled to get flu vaccine in fall  -Continue current medications and maintain follow up with specialists.  Return to clinic in 6 months with labs prior at [facility] per patient request       Italia Ramírez MD  [facility] [place]

## 2020-08-03 NOTE — ASSESSMENT & PLAN NOTE
Stable on Wellbutrin 200 mg BID, Lexapro 20 mg daily, Lorazepam PRN.  No SI/HI/panic attacks.  Anxiety has been worse since COVID 19 started.  Gets down because he can't do what he used to do.   Takes ativan most nights, occasionally takes during the day.

## 2020-08-03 NOTE — ASSESSMENT & PLAN NOTE
Sees Dr. Bartlett who checked inner ears, did MRI brain which showed some white matter changes of brain related to aging.  Dr. Jimenez thinks his falls are related to his BLE weakness from back issues.  Has rizotomy scheduled with Dr. Jimenez next week.  Having lots of pain.  Has difficulty with walking because foot are numb. Dr. Andrade did nerve conduction study of LLE which is pending at this time.

## 2020-08-03 NOTE — ASSESSMENT & PLAN NOTE
Last MRI L spine on 10/1/19 with Dr. Jimenez.  Also has some left leg weakness, did PT which helped some in past.

## 2020-09-29 ENCOUNTER — PATIENT MESSAGE (OUTPATIENT)
Dept: OTHER | Facility: OTHER | Age: 80
End: 2020-09-29

## 2020-11-16 ENCOUNTER — TELEPHONE (OUTPATIENT)
Dept: FAMILY MEDICINE | Facility: CLINIC | Age: 80
End: 2020-11-16

## 2020-11-16 RX ORDER — LORAZEPAM 0.5 MG/1
0.5 TABLET ORAL EVERY 8 HOURS PRN
Qty: 90 TABLET | Refills: 5 | Status: SHIPPED | OUTPATIENT
Start: 2020-11-16 | End: 2021-05-21 | Stop reason: SDUPTHER

## 2020-11-16 NOTE — TELEPHONE ENCOUNTER
Spoke to pt, he used to take 0.5mg of lorazepam took it TID. Replaced it with lexapro 20mg 1 po qam. Said it is starting to make him very lethargic, stopped taking the lexapro 20 and started taking lexapro 5mg in the morning, that didn't help at all. So he is not taking Lorazepam again, 0.5mg 1 po tid. aking for the lorazepam to be called in for TID. He discontinued the lexapro completley.

## 2020-11-16 NOTE — TELEPHONE ENCOUNTER
----- Message from Amaya Gibbs sent at 11/16/2020 11:32 AM CST -----  Telephone consult     Is this a refill or new RX: NEW/ Refill due to an increase in mgs of the Lorazepam    RX name and strength: LORazepam (ATIVAN) 0.5 MG tablet    Pharmacy name and phone # [id number] - [place] - [address] AT [facility] [address] [phone] (Phone) [phone] (Fax)    Comments: He asked if you can increase the dosage to 0.5mg one pill 3 times a day. The reason is that he doesn't take the escitalopram oxalate (LEXAPRO) 20 MG tablet anymore because, he can't think and he sleeps all day.

## 2020-12-11 ENCOUNTER — PATIENT MESSAGE (OUTPATIENT)
Dept: OTHER | Facility: OTHER | Age: 80
End: 2020-12-11

## 2021-01-15 ENCOUNTER — IMMUNIZATION (OUTPATIENT)
Dept: INTERNAL MEDICINE | Facility: CLINIC | Age: 81
End: 2021-01-15
Payer: MEDICARE

## 2021-01-15 DIAGNOSIS — Z23 NEED FOR VACCINATION: Primary | ICD-10-CM

## 2021-01-15 PROCEDURE — 91300 COVID-19, MRNA, LNP-S, PF, 30 MCG/0.3 ML DOSE VACCINE: CPT | Mod: PBBFAC | Performed by: FAMILY MEDICINE

## 2021-02-05 ENCOUNTER — IMMUNIZATION (OUTPATIENT)
Dept: INTERNAL MEDICINE | Facility: CLINIC | Age: 81
End: 2021-02-05
Payer: MEDICARE

## 2021-02-05 DIAGNOSIS — Z23 NEED FOR VACCINATION: Primary | ICD-10-CM

## 2021-02-05 PROCEDURE — 0002A COVID-19, MRNA, LNP-S, PF, 30 MCG/0.3 ML DOSE VACCINE: CPT | Mod: PBBFAC | Performed by: FAMILY MEDICINE

## 2021-02-05 PROCEDURE — 91300 COVID-19, MRNA, LNP-S, PF, 30 MCG/0.3 ML DOSE VACCINE: CPT | Mod: PBBFAC | Performed by: FAMILY MEDICINE

## 2021-02-17 ENCOUNTER — TELEPHONE (OUTPATIENT)
Dept: FAMILY MEDICINE | Facility: CLINIC | Age: 81
End: 2021-02-17

## 2021-02-19 LAB
ALBUMIN SERPL-MCNC: 4.3 G/DL (ref 3.6–5.1)
ALBUMIN/GLOB SERPL: 1.7 (CALC) (ref 1–2.5)
ALP SERPL-CCNC: 55 U/L (ref 35–144)
ALT SERPL-CCNC: 24 U/L (ref 9–46)
AST SERPL-CCNC: 24 U/L (ref 10–35)
BASOPHILS # BLD AUTO: 59 CELLS/UL (ref 0–200)
BASOPHILS NFR BLD AUTO: 0.9 %
BILIRUB SERPL-MCNC: 0.4 MG/DL (ref 0.2–1.2)
BUN SERPL-MCNC: 20 MG/DL (ref 7–25)
BUN/CREAT SERPL: NORMAL (CALC) (ref 6–22)
CALCIUM SERPL-MCNC: 9.8 MG/DL (ref 8.6–10.3)
CHLORIDE SERPL-SCNC: 103 MMOL/L (ref 98–110)
CHOLEST SERPL-MCNC: 164 MG/DL
CHOLEST/HDLC SERPL: 2.1 (CALC)
CO2 SERPL-SCNC: 29 MMOL/L (ref 20–32)
CREAT SERPL-MCNC: 0.95 MG/DL (ref 0.7–1.11)
EOSINOPHIL # BLD AUTO: 653 CELLS/UL (ref 15–500)
EOSINOPHIL NFR BLD AUTO: 9.9 %
ERYTHROCYTE [DISTWIDTH] IN BLOOD BY AUTOMATED COUNT: 12.8 % (ref 11–15)
GFRSERPLBLD MDRD-ARVRAT: 74 ML/MIN/1.73M2
GLOBULIN SER CALC-MCNC: 2.5 G/DL (CALC) (ref 1.9–3.7)
GLUCOSE SERPL-MCNC: 95 MG/DL (ref 65–99)
HCT VFR BLD AUTO: 42.3 % (ref 38.5–50)
HDLC SERPL-MCNC: 78 MG/DL
HGB BLD-MCNC: 14.2 G/DL (ref 13.2–17.1)
LDLC SERPL CALC-MCNC: 72 MG/DL (CALC)
LYMPHOCYTES # BLD AUTO: 1756 CELLS/UL (ref 850–3900)
LYMPHOCYTES NFR BLD AUTO: 26.6 %
MCH RBC QN AUTO: 31.8 PG (ref 27–33)
MCHC RBC AUTO-ENTMCNC: 33.6 G/DL (ref 32–36)
MCV RBC AUTO: 94.6 FL (ref 80–100)
MONOCYTES # BLD AUTO: 515 CELLS/UL (ref 200–950)
MONOCYTES NFR BLD AUTO: 7.8 %
NEUTROPHILS # BLD AUTO: 3617 CELLS/UL (ref 1500–7800)
NEUTROPHILS NFR BLD AUTO: 54.8 %
NONHDLC SERPL-MCNC: 86 MG/DL (CALC)
PLATELET # BLD AUTO: 230 THOUSAND/UL (ref 140–400)
PMV BLD REES-ECKER: 10.2 FL (ref 7.5–12.5)
POTASSIUM SERPL-SCNC: 4.7 MMOL/L (ref 3.5–5.3)
PROT SERPL-MCNC: 6.8 G/DL (ref 6.1–8.1)
RBC # BLD AUTO: 4.47 MILLION/UL (ref 4.2–5.8)
SODIUM SERPL-SCNC: 138 MMOL/L (ref 135–146)
TRIGL SERPL-MCNC: 62 MG/DL
WBC # BLD AUTO: 6.6 THOUSAND/UL (ref 3.8–10.8)

## 2021-02-22 RX ORDER — ATORVASTATIN CALCIUM 10 MG/1
10 TABLET, FILM COATED ORAL DAILY
Qty: 90 TABLET | Refills: 1 | Status: SHIPPED | OUTPATIENT
Start: 2021-02-22 | End: 2021-11-15 | Stop reason: SDUPTHER

## 2021-02-24 ENCOUNTER — OFFICE VISIT (OUTPATIENT)
Dept: FAMILY MEDICINE | Facility: CLINIC | Age: 81
End: 2021-02-24
Payer: MEDICARE

## 2021-02-24 VITALS
DIASTOLIC BLOOD PRESSURE: 72 MMHG | SYSTOLIC BLOOD PRESSURE: 124 MMHG | TEMPERATURE: 98 F | HEART RATE: 68 BPM | BODY MASS INDEX: 27.46 KG/M2 | WEIGHT: 165 LBS | OXYGEN SATURATION: 97 %

## 2021-02-24 DIAGNOSIS — F41.9 ANXIETY: ICD-10-CM

## 2021-02-24 DIAGNOSIS — I10 ESSENTIAL HYPERTENSION: ICD-10-CM
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
## 2021-02-24 DIAGNOSIS — R20.0 NUMBNESS IN FEET: Primary | ICD-10-CM

## 2021-02-24 DIAGNOSIS — G89.29 CHRONIC BILATERAL LOW BACK PAIN WITHOUT SCIATICA: ICD-10-CM

## 2021-02-24 DIAGNOSIS — C61 CARCINOMA OF PROSTATE: ICD-10-CM

## 2021-02-24 DIAGNOSIS — K21.9 GASTROESOPHAGEAL REFLUX DISEASE WITHOUT ESOPHAGITIS: ICD-10-CM

## 2021-02-24 DIAGNOSIS — I25.10 ARTERIOSCLEROSIS OF CORONARY ARTERY: ICD-10-CM

## 2021-02-24 DIAGNOSIS — M54.50 CHRONIC BILATERAL LOW BACK PAIN WITHOUT SCIATICA: ICD-10-CM

## 2021-02-24 DIAGNOSIS — E78.2 MIXED HYPERLIPIDEMIA: ICD-10-CM

## 2021-02-24 PROCEDURE — 99499 RISK ADDL DX/OHS AUDIT: ICD-10-PCS | Mod: S$GLB,,, | Performed by: INTERNAL MEDICINE

## 2021-02-24 PROCEDURE — 99214 OFFICE O/P EST MOD 30 MIN: CPT | Mod: S$GLB,,, | Performed by: INTERNAL MEDICINE

## 2021-02-24 PROCEDURE — 1125F PR PAIN SEVERITY QUANTIFIED, PAIN PRESENT: ICD-10-PCS | Mod: S$GLB,,, | Performed by: INTERNAL MEDICINE

## 2021-02-24 PROCEDURE — 3074F PR MOST RECENT SYSTOLIC BLOOD PRESSURE < 130 MM HG: ICD-10-PCS | Mod: CPTII,S$GLB,, | Performed by: INTERNAL MEDICINE

## 2021-02-24 PROCEDURE — 99214 PR OFFICE/OUTPT VISIT, EST, LEVL IV, 30-39 MIN: ICD-10-PCS | Mod: S$GLB,,, | Performed by: INTERNAL MEDICINE

## 2021-02-24 PROCEDURE — 99999 PR PBB SHADOW E&M-EST. PATIENT-LVL III: CPT | Mod: PBBFAC,,, | Performed by: INTERNAL MEDICINE

## 2021-02-24 PROCEDURE — 3078F DIAST BP <80 MM HG: CPT | Mod: CPTII,S$GLB,, | Performed by: INTERNAL MEDICINE

## 2021-02-24 PROCEDURE — 1159F MED LIST DOCD IN RCRD: CPT | Mod: S$GLB,,, | Performed by: INTERNAL MEDICINE

## 2021-02-24 PROCEDURE — 1159F PR MEDICATION LIST DOCUMENTED IN MEDICAL RECORD: ICD-10-PCS | Mod: S$GLB,,, | Performed by: INTERNAL MEDICINE

## 2021-02-24 PROCEDURE — 3078F PR MOST RECENT DIASTOLIC BLOOD PRESSURE < 80 MM HG: ICD-10-PCS | Mod: CPTII,S$GLB,, | Performed by: INTERNAL MEDICINE

## 2021-02-24 PROCEDURE — 3074F SYST BP LT 130 MM HG: CPT | Mod: CPTII,S$GLB,, | Performed by: INTERNAL MEDICINE

## 2021-02-24 PROCEDURE — 99999 PR PBB SHADOW E&M-EST. PATIENT-LVL III: ICD-10-PCS | Mod: PBBFAC,,, | Performed by: INTERNAL MEDICINE

## 2021-02-24 PROCEDURE — 1125F AMNT PAIN NOTED PAIN PRSNT: CPT | Mod: S$GLB,,, | Performed by: INTERNAL MEDICINE

## 2021-02-24 PROCEDURE — 99499 UNLISTED E&M SERVICE: CPT | Mod: S$GLB,,, | Performed by: INTERNAL MEDICINE

## 2021-02-24 RX ORDER — LOSARTAN POTASSIUM 25 MG/1
25 TABLET ORAL DAILY
COMMUNITY
Start: 2021-01-30 | End: 2022-02-25

## 2021-02-24 RX ORDER — ASPIRIN 81 MG/1
81 TABLET ORAL DAILY
Qty: 90 TABLET | Refills: 3
Start: 2021-02-24 | End: 2022-02-25 | Stop reason: SDUPTHER

## 2021-02-24 RX ORDER — AMLODIPINE BESYLATE 5 MG/1
5 TABLET ORAL DAILY
COMMUNITY
End: 2022-02-25

## 2021-03-23 ENCOUNTER — PES CALL (OUTPATIENT)
Dept: ADMINISTRATIVE | Facility: CLINIC | Age: 81
End: 2021-03-23

## 2021-04-05 ENCOUNTER — TELEPHONE (OUTPATIENT)
Dept: FAMILY MEDICINE | Facility: CLINIC | Age: 81
End: 2021-04-05

## 2021-04-05 RX ORDER — FUROSEMIDE 20 MG/1
20 TABLET ORAL DAILY
Qty: 90 TABLET | Refills: 3 | Status: SHIPPED | OUTPATIENT
Start: 2021-04-05 | End: 2021-08-25

## 2021-04-13 ENCOUNTER — TELEPHONE (OUTPATIENT)
Dept: INTERNAL MEDICINE | Facility: CLINIC | Age: 81
End: 2021-04-13

## 2021-04-20 ENCOUNTER — TELEPHONE (OUTPATIENT)
Dept: ADMINISTRATIVE | Facility: CLINIC | Age: 81
End: 2021-04-20

## 2021-05-21 RX ORDER — LORAZEPAM 0.5 MG/1
0.5 TABLET ORAL EVERY 8 HOURS PRN
Qty: 90 TABLET | Refills: 0 | Status: SHIPPED | OUTPATIENT
Start: 2021-05-21 | End: 2021-06-21 | Stop reason: SDUPTHER

## 2021-05-27 ENCOUNTER — PES CALL (OUTPATIENT)
Dept: ADMINISTRATIVE | Facility: CLINIC | Age: 81
End: 2021-05-27

## 2021-05-28 ENCOUNTER — TELEPHONE (OUTPATIENT)
Dept: ADMINISTRATIVE | Facility: CLINIC | Age: 81
End: 2021-05-28

## 2021-06-21 RX ORDER — LORAZEPAM 0.5 MG/1
0.5 TABLET ORAL EVERY 8 HOURS PRN
Qty: 90 TABLET | Refills: 0 | Status: SHIPPED | OUTPATIENT
Start: 2021-06-21 | End: 2021-07-26

## 2021-06-22 ENCOUNTER — PATIENT OUTREACH (OUTPATIENT)
Dept: ADMINISTRATIVE | Facility: OTHER | Age: 81
End: 2021-06-22

## 2021-06-22 RX ORDER — LORAZEPAM 0.5 MG/1
TABLET ORAL
Qty: 270 TABLET | OUTPATIENT
Start: 2021-06-22

## 2021-06-23 ENCOUNTER — OFFICE VISIT (OUTPATIENT)
Dept: OPHTHALMOLOGY | Facility: CLINIC | Age: 81
End: 2021-06-23
Payer: MEDICARE

## 2021-06-23 ENCOUNTER — TELEPHONE (OUTPATIENT)
Dept: OPHTHALMOLOGY | Facility: CLINIC | Age: 81
End: 2021-06-23

## 2021-06-23 DIAGNOSIS — H51.8 SKEW DEVIATION: ICD-10-CM

## 2021-06-23 DIAGNOSIS — H55.09 DOWNBEAT NYSTAGMUS: Primary | ICD-10-CM

## 2021-06-23 DIAGNOSIS — H51.11 CONVERGENCE INSUFFICIENCY: ICD-10-CM

## 2021-06-23 DIAGNOSIS — H50.00 ESOTROPIA: ICD-10-CM

## 2021-06-23 PROCEDURE — 1159F MED LIST DOCD IN RCRD: CPT | Mod: S$GLB,,, | Performed by: STUDENT IN AN ORGANIZED HEALTH CARE EDUCATION/TRAINING PROGRAM

## 2021-06-23 PROCEDURE — 99205 PR OFFICE/OUTPT VISIT, NEW, LEVL V, 60-74 MIN: ICD-10-PCS | Mod: S$GLB,,, | Performed by: STUDENT IN AN ORGANIZED HEALTH CARE EDUCATION/TRAINING PROGRAM

## 2021-06-23 PROCEDURE — 3288F FALL RISK ASSESSMENT DOCD: CPT | Mod: CPTII,S$GLB,, | Performed by: STUDENT IN AN ORGANIZED HEALTH CARE EDUCATION/TRAINING PROGRAM

## 2021-06-23 PROCEDURE — 99205 OFFICE O/P NEW HI 60 MIN: CPT | Mod: S$GLB,,, | Performed by: STUDENT IN AN ORGANIZED HEALTH CARE EDUCATION/TRAINING PROGRAM

## 2021-06-23 PROCEDURE — 99999 PR PBB SHADOW E&M-EST. PATIENT-LVL III: ICD-10-PCS | Mod: PBBFAC,,, | Performed by: STUDENT IN AN ORGANIZED HEALTH CARE EDUCATION/TRAINING PROGRAM

## 2021-06-23 PROCEDURE — 1126F PR PAIN SEVERITY QUANTIFIED, NO PAIN PRESENT: ICD-10-PCS | Mod: S$GLB,,, | Performed by: STUDENT IN AN ORGANIZED HEALTH CARE EDUCATION/TRAINING PROGRAM

## 2021-06-23 PROCEDURE — 1101F PT FALLS ASSESS-DOCD LE1/YR: CPT | Mod: CPTII,S$GLB,, | Performed by: STUDENT IN AN ORGANIZED HEALTH CARE EDUCATION/TRAINING PROGRAM

## 2021-06-23 PROCEDURE — 3288F PR FALLS RISK ASSESSMENT DOCUMENTED: ICD-10-PCS | Mod: CPTII,S$GLB,, | Performed by: STUDENT IN AN ORGANIZED HEALTH CARE EDUCATION/TRAINING PROGRAM

## 2021-06-23 PROCEDURE — 99999 PR PBB SHADOW E&M-EST. PATIENT-LVL III: CPT | Mod: PBBFAC,,, | Performed by: STUDENT IN AN ORGANIZED HEALTH CARE EDUCATION/TRAINING PROGRAM

## 2021-06-23 PROCEDURE — 1159F PR MEDICATION LIST DOCUMENTED IN MEDICAL RECORD: ICD-10-PCS | Mod: S$GLB,,, | Performed by: STUDENT IN AN ORGANIZED HEALTH CARE EDUCATION/TRAINING PROGRAM

## 2021-06-23 PROCEDURE — 1101F PR PT FALLS ASSESS DOC 0-1 FALLS W/OUT INJ PAST YR: ICD-10-PCS | Mod: CPTII,S$GLB,, | Performed by: STUDENT IN AN ORGANIZED HEALTH CARE EDUCATION/TRAINING PROGRAM

## 2021-06-23 PROCEDURE — 1126F AMNT PAIN NOTED NONE PRSNT: CPT | Mod: S$GLB,,, | Performed by: STUDENT IN AN ORGANIZED HEALTH CARE EDUCATION/TRAINING PROGRAM

## 2021-06-23 RX ORDER — ESCITALOPRAM OXALATE 20 MG/1
TABLET ORAL
COMMUNITY
Start: 2021-04-05 | End: 2021-07-15 | Stop reason: SDUPTHER

## 2021-06-23 RX ORDER — DALFAMPRIDINE 10 MG/1
10 TABLET, FILM COATED, EXTENDED RELEASE ORAL 2 TIMES DAILY
Qty: 60 TABLET | Refills: 1 | Status: SHIPPED | OUTPATIENT
Start: 2021-06-23 | End: 2021-06-30 | Stop reason: ALTCHOICE

## 2021-06-23 RX ORDER — DALFAMPRIDINE 10 MG/1
10 TABLET, FILM COATED, EXTENDED RELEASE ORAL 2 TIMES DAILY
Qty: 60 TABLET | Refills: 1 | OUTPATIENT
Start: 2021-06-23 | End: 2021-06-23 | Stop reason: SDUPTHER

## 2021-06-24 ENCOUNTER — TELEPHONE (OUTPATIENT)
Dept: OPHTHALMOLOGY | Facility: CLINIC | Age: 81
End: 2021-06-24

## 2021-06-25 ENCOUNTER — TELEPHONE (OUTPATIENT)
Dept: OPHTHALMOLOGY | Facility: CLINIC | Age: 81
End: 2021-06-25

## 2021-06-30 RX ORDER — BACLOFEN 5 MG/1
5 TABLET ORAL 2 TIMES DAILY
Qty: 60 TABLET | Refills: 11 | Status: SHIPPED | OUTPATIENT
Start: 2021-06-30 | End: 2021-08-12

## 2021-07-02 RX ORDER — FLUTICASONE PROPIONATE 50 MCG
1 SPRAY, SUSPENSION (ML) NASAL DAILY
Qty: 32 G | Refills: 11 | Status: SHIPPED | OUTPATIENT
Start: 2021-07-02

## 2021-07-12 ENCOUNTER — TELEPHONE (OUTPATIENT)
Dept: FAMILY MEDICINE | Facility: CLINIC | Age: 81
End: 2021-07-12

## 2021-07-15 RX ORDER — ESCITALOPRAM OXALATE 20 MG/1
20 TABLET ORAL DAILY
Qty: 90 TABLET | Refills: 3 | Status: SHIPPED | OUTPATIENT
Start: 2021-07-15 | End: 2022-02-25 | Stop reason: SDUPTHER

## 2021-07-27 ENCOUNTER — TELEPHONE (OUTPATIENT)
Dept: OPHTHALMOLOGY | Facility: CLINIC | Age: 81
End: 2021-07-27

## 2021-08-12 ENCOUNTER — PATIENT MESSAGE (OUTPATIENT)
Dept: OPHTHALMOLOGY | Facility: CLINIC | Age: 81
End: 2021-08-12

## 2021-08-12 ENCOUNTER — TELEPHONE (OUTPATIENT)
Dept: OPHTHALMOLOGY | Facility: CLINIC | Age: 81
End: 2021-08-12

## 2021-08-12 RX ORDER — BACLOFEN 5 MG/1
TABLET ORAL
Qty: 90 TABLET | Refills: 11 | Status: SHIPPED | OUTPATIENT
Start: 2021-08-12 | End: 2022-09-12

## 2021-08-13 ENCOUNTER — PATIENT MESSAGE (OUTPATIENT)
Dept: OPHTHALMOLOGY | Facility: CLINIC | Age: 81
End: 2021-08-13

## 2021-08-17 ENCOUNTER — PATIENT OUTREACH (OUTPATIENT)
Dept: ADMINISTRATIVE | Facility: OTHER | Age: 81
End: 2021-08-17

## 2021-08-17 ENCOUNTER — TELEPHONE (OUTPATIENT)
Dept: OPHTHALMOLOGY | Facility: CLINIC | Age: 81
End: 2021-08-17

## 2021-08-18 ENCOUNTER — OFFICE VISIT (OUTPATIENT)
Dept: OPHTHALMOLOGY | Facility: CLINIC | Age: 81
End: 2021-08-18
Payer: MEDICARE

## 2021-08-18 DIAGNOSIS — H49.883: ICD-10-CM

## 2021-08-18 DIAGNOSIS — R73.09 OTHER ABNORMAL GLUCOSE: ICD-10-CM

## 2021-08-18 DIAGNOSIS — I63.9 CEREBROVASCULAR ACCIDENT (CVA), UNSPECIFIED MECHANISM: Primary | ICD-10-CM

## 2021-08-18 PROCEDURE — 99999 PR PBB SHADOW E&M-EST. PATIENT-LVL II: CPT | Mod: PBBFAC,,, | Performed by: STUDENT IN AN ORGANIZED HEALTH CARE EDUCATION/TRAINING PROGRAM

## 2021-08-18 PROCEDURE — 1160F PR REVIEW ALL MEDS BY PRESCRIBER/CLIN PHARMACIST DOCUMENTED: ICD-10-PCS | Mod: CPTII,S$GLB,, | Performed by: STUDENT IN AN ORGANIZED HEALTH CARE EDUCATION/TRAINING PROGRAM

## 2021-08-18 PROCEDURE — 1159F PR MEDICATION LIST DOCUMENTED IN MEDICAL RECORD: ICD-10-PCS | Mod: CPTII,S$GLB,, | Performed by: STUDENT IN AN ORGANIZED HEALTH CARE EDUCATION/TRAINING PROGRAM

## 2021-08-18 PROCEDURE — 3288F FALL RISK ASSESSMENT DOCD: CPT | Mod: CPTII,S$GLB,, | Performed by: STUDENT IN AN ORGANIZED HEALTH CARE EDUCATION/TRAINING PROGRAM

## 2021-08-18 PROCEDURE — 1126F AMNT PAIN NOTED NONE PRSNT: CPT | Mod: CPTII,S$GLB,, | Performed by: STUDENT IN AN ORGANIZED HEALTH CARE EDUCATION/TRAINING PROGRAM

## 2021-08-18 PROCEDURE — 1159F MED LIST DOCD IN RCRD: CPT | Mod: CPTII,S$GLB,, | Performed by: STUDENT IN AN ORGANIZED HEALTH CARE EDUCATION/TRAINING PROGRAM

## 2021-08-18 PROCEDURE — 3288F PR FALLS RISK ASSESSMENT DOCUMENTED: ICD-10-PCS | Mod: CPTII,S$GLB,, | Performed by: STUDENT IN AN ORGANIZED HEALTH CARE EDUCATION/TRAINING PROGRAM

## 2021-08-18 PROCEDURE — 99999 PR PBB SHADOW E&M-EST. PATIENT-LVL II: ICD-10-PCS | Mod: PBBFAC,,, | Performed by: STUDENT IN AN ORGANIZED HEALTH CARE EDUCATION/TRAINING PROGRAM

## 2021-08-18 PROCEDURE — 1126F PR PAIN SEVERITY QUANTIFIED, NO PAIN PRESENT: ICD-10-PCS | Mod: CPTII,S$GLB,, | Performed by: STUDENT IN AN ORGANIZED HEALTH CARE EDUCATION/TRAINING PROGRAM

## 2021-08-18 PROCEDURE — 99215 PR OFFICE/OUTPT VISIT, EST, LEVL V, 40-54 MIN: ICD-10-PCS | Mod: S$GLB,,, | Performed by: STUDENT IN AN ORGANIZED HEALTH CARE EDUCATION/TRAINING PROGRAM

## 2021-08-18 PROCEDURE — 1160F RVW MEDS BY RX/DR IN RCRD: CPT | Mod: CPTII,S$GLB,, | Performed by: STUDENT IN AN ORGANIZED HEALTH CARE EDUCATION/TRAINING PROGRAM

## 2021-08-18 PROCEDURE — 99215 OFFICE O/P EST HI 40 MIN: CPT | Mod: S$GLB,,, | Performed by: STUDENT IN AN ORGANIZED HEALTH CARE EDUCATION/TRAINING PROGRAM

## 2021-08-18 PROCEDURE — 1101F PT FALLS ASSESS-DOCD LE1/YR: CPT | Mod: CPTII,S$GLB,, | Performed by: STUDENT IN AN ORGANIZED HEALTH CARE EDUCATION/TRAINING PROGRAM

## 2021-08-18 PROCEDURE — 1101F PR PT FALLS ASSESS DOC 0-1 FALLS W/OUT INJ PAST YR: ICD-10-PCS | Mod: CPTII,S$GLB,, | Performed by: STUDENT IN AN ORGANIZED HEALTH CARE EDUCATION/TRAINING PROGRAM

## 2021-08-18 RX ORDER — LORATADINE 10 MG/1
10 TABLET ORAL
COMMUNITY
Start: 2017-03-30 | End: 2022-02-25

## 2021-08-18 RX ORDER — PANTOPRAZOLE SODIUM 40 MG/1
40 TABLET, DELAYED RELEASE ORAL DAILY
COMMUNITY
Start: 2021-07-23 | End: 2022-08-30

## 2021-08-18 RX ORDER — IRBESARTAN 150 MG/1
150 TABLET ORAL DAILY
COMMUNITY
Start: 2021-07-02

## 2021-08-18 RX ORDER — MELATONIN/PYRIDOXINE HCL (B6) 5 MG-10 MG
5 TABLET,IMMED, EXTENDED RELEASE, BIPHASIC ORAL
COMMUNITY
Start: 2014-03-27 | End: 2023-06-29 | Stop reason: ALTCHOICE

## 2021-08-23 ENCOUNTER — TELEPHONE (OUTPATIENT)
Dept: FAMILY MEDICINE | Facility: CLINIC | Age: 81
End: 2021-08-23

## 2021-08-23 DIAGNOSIS — E78.2 MIXED HYPERLIPIDEMIA: Primary | ICD-10-CM

## 2021-08-23 LAB
CHOL/HDLC RATIO: 2.2
CHOLEST SERPL-MSCNC: 164 MG/DL (ref 0–200)
HDLC SERPL-MCNC: 74 MG/DL
LDL CHOLESTEROL DIRECT: 86 MG/DL
LDLC SERPL CALC-MCNC: 73 MG/DL
NON-HDL CHOLESTEROL: 90
TRIGLYCERIDE (LIPID PAN): 85

## 2021-08-25 ENCOUNTER — TELEPHONE (OUTPATIENT)
Dept: ADMINISTRATIVE | Facility: HOSPITAL | Age: 81
End: 2021-08-25

## 2021-08-25 ENCOUNTER — OFFICE VISIT (OUTPATIENT)
Dept: FAMILY MEDICINE | Facility: CLINIC | Age: 81
End: 2021-08-25
Payer: MEDICARE

## 2021-08-25 ENCOUNTER — PATIENT OUTREACH (OUTPATIENT)
Dept: ADMINISTRATIVE | Facility: HOSPITAL | Age: 81
End: 2021-08-25

## 2021-08-25 VITALS
WEIGHT: 169.56 LBS | TEMPERATURE: 98 F | HEART RATE: 90 BPM | OXYGEN SATURATION: 94 % | BODY MASS INDEX: 28.21 KG/M2 | DIASTOLIC BLOOD PRESSURE: 58 MMHG | SYSTOLIC BLOOD PRESSURE: 112 MMHG

## 2021-08-25 DIAGNOSIS — K21.9 GASTROESOPHAGEAL REFLUX DISEASE WITHOUT ESOPHAGITIS: ICD-10-CM

## 2021-08-25 DIAGNOSIS — Z86.73 HISTORY OF CVA (CEREBROVASCULAR ACCIDENT): ICD-10-CM

## 2021-08-25 DIAGNOSIS — I25.10 ARTERIOSCLEROSIS OF CORONARY ARTERY: ICD-10-CM

## 2021-08-25 DIAGNOSIS — E78.2 MIXED HYPERLIPIDEMIA: ICD-10-CM

## 2021-08-25 DIAGNOSIS — I10 ESSENTIAL HYPERTENSION: ICD-10-CM

## 2021-08-25 DIAGNOSIS — C61 CARCINOMA OF PROSTATE: ICD-10-CM

## 2021-08-25 DIAGNOSIS — F41.9 ANXIETY: Primary | ICD-10-CM

## 2021-08-25 DIAGNOSIS — E07.9 DISORDER OF THYROID, UNSPECIFIED: ICD-10-CM

## 2021-08-25 DIAGNOSIS — R79.89 ELEVATED TSH: ICD-10-CM
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
## 2021-08-25 PROBLEM — R20.0 NUMBNESS IN FEET: Status: RESOLVED | Noted: 2020-08-03 | Resolved: 2021-08-25

## 2021-08-25 PROBLEM — R29.6 FALLS: Status: RESOLVED | Noted: 2020-05-04 | Resolved: 2021-08-25

## 2021-08-25 PROCEDURE — 3074F PR MOST RECENT SYSTOLIC BLOOD PRESSURE < 130 MM HG: ICD-10-PCS | Mod: CPTII,S$GLB,, | Performed by: INTERNAL MEDICINE

## 2021-08-25 PROCEDURE — 99499 RISK ADDL DX/OHS AUDIT: ICD-10-PCS | Mod: S$GLB,,, | Performed by: INTERNAL MEDICINE

## 2021-08-25 PROCEDURE — 1126F PR PAIN SEVERITY QUANTIFIED, NO PAIN PRESENT: ICD-10-PCS | Mod: CPTII,S$GLB,, | Performed by: INTERNAL MEDICINE

## 2021-08-25 PROCEDURE — 1101F PR PT FALLS ASSESS DOC 0-1 FALLS W/OUT INJ PAST YR: ICD-10-PCS | Mod: CPTII,S$GLB,, | Performed by: INTERNAL MEDICINE

## 2021-08-25 PROCEDURE — 3288F FALL RISK ASSESSMENT DOCD: CPT | Mod: CPTII,S$GLB,, | Performed by: INTERNAL MEDICINE

## 2021-08-25 PROCEDURE — 99214 OFFICE O/P EST MOD 30 MIN: CPT | Mod: S$GLB,,, | Performed by: INTERNAL MEDICINE

## 2021-08-25 PROCEDURE — 3078F DIAST BP <80 MM HG: CPT | Mod: CPTII,S$GLB,, | Performed by: INTERNAL MEDICINE

## 2021-08-25 PROCEDURE — 99499 UNLISTED E&M SERVICE: CPT | Mod: S$GLB,,, | Performed by: INTERNAL MEDICINE

## 2021-08-25 PROCEDURE — 3074F SYST BP LT 130 MM HG: CPT | Mod: CPTII,S$GLB,, | Performed by: INTERNAL MEDICINE

## 2021-08-25 PROCEDURE — 1159F MED LIST DOCD IN RCRD: CPT | Mod: CPTII,S$GLB,, | Performed by: INTERNAL MEDICINE

## 2021-08-25 PROCEDURE — 3078F PR MOST RECENT DIASTOLIC BLOOD PRESSURE < 80 MM HG: ICD-10-PCS | Mod: CPTII,S$GLB,, | Performed by: INTERNAL MEDICINE

## 2021-08-25 PROCEDURE — 3288F PR FALLS RISK ASSESSMENT DOCUMENTED: ICD-10-PCS | Mod: CPTII,S$GLB,, | Performed by: INTERNAL MEDICINE

## 2021-08-25 PROCEDURE — 99999 PR PBB SHADOW E&M-EST. PATIENT-LVL IV: CPT | Mod: PBBFAC,,, | Performed by: INTERNAL MEDICINE

## 2021-08-25 PROCEDURE — 1101F PT FALLS ASSESS-DOCD LE1/YR: CPT | Mod: CPTII,S$GLB,, | Performed by: INTERNAL MEDICINE

## 2021-08-25 PROCEDURE — 99214 PR OFFICE/OUTPT VISIT, EST, LEVL IV, 30-39 MIN: ICD-10-PCS | Mod: S$GLB,,, | Performed by: INTERNAL MEDICINE

## 2021-08-25 PROCEDURE — 1159F PR MEDICATION LIST DOCUMENTED IN MEDICAL RECORD: ICD-10-PCS | Mod: CPTII,S$GLB,, | Performed by: INTERNAL MEDICINE

## 2021-08-25 PROCEDURE — 99999 PR PBB SHADOW E&M-EST. PATIENT-LVL IV: ICD-10-PCS | Mod: PBBFAC,,, | Performed by: INTERNAL MEDICINE

## 2021-08-25 PROCEDURE — 1126F AMNT PAIN NOTED NONE PRSNT: CPT | Mod: CPTII,S$GLB,, | Performed by: INTERNAL MEDICINE

## 2021-08-25 RX ORDER — FUROSEMIDE 20 MG/1
20 TABLET ORAL DAILY PRN
Qty: 90 TABLET | Refills: 3 | Status: SHIPPED | OUTPATIENT
Start: 2021-08-25 | End: 2022-02-25

## 2021-08-26 DIAGNOSIS — E07.9 DISORDER OF THYROID, UNSPECIFIED: Primary | ICD-10-CM

## 2021-09-15 ENCOUNTER — PES CALL (OUTPATIENT)
Dept: ADMINISTRATIVE | Facility: CLINIC | Age: 81
End: 2021-09-15

## 2021-10-08 LAB
ALBUMIN SERPL-MCNC: 4.1 G/DL (ref 3.6–5.1)
ALBUMIN/GLOB SERPL: 1.6 (CALC) (ref 1–2.5)
ALP SERPL-CCNC: 49 U/L (ref 35–144)
ALT SERPL-CCNC: 14 U/L (ref 9–46)
AST SERPL-CCNC: 15 U/L (ref 10–35)
BASOPHILS # BLD AUTO: 46 CELLS/UL (ref 0–200)
BASOPHILS NFR BLD AUTO: 0.6 %
BILIRUB SERPL-MCNC: 0.4 MG/DL (ref 0.2–1.2)
BUN SERPL-MCNC: 18 MG/DL (ref 7–25)
BUN/CREAT SERPL: NORMAL (CALC) (ref 6–22)
CALCIUM SERPL-MCNC: 9.5 MG/DL (ref 8.6–10.3)
CHLORIDE SERPL-SCNC: 104 MMOL/L (ref 98–110)
CHOLEST SERPL-MCNC: 164 MG/DL
CHOLEST/HDLC SERPL: 2.5 (CALC)
CO2 SERPL-SCNC: 30 MMOL/L (ref 20–32)
CREAT SERPL-MCNC: 0.96 MG/DL (ref 0.7–1.11)
EOSINOPHIL # BLD AUTO: 562 CELLS/UL (ref 15–500)
EOSINOPHIL NFR BLD AUTO: 7.3 %
ERYTHROCYTE [DISTWIDTH] IN BLOOD BY AUTOMATED COUNT: 12.9 % (ref 11–15)
GLOBULIN SER CALC-MCNC: 2.6 G/DL (CALC) (ref 1.9–3.7)
GLUCOSE SERPL-MCNC: 94 MG/DL (ref 65–139)
HCT VFR BLD AUTO: 41.5 % (ref 38.5–50)
HDLC SERPL-MCNC: 66 MG/DL
HGB BLD-MCNC: 13.9 G/DL (ref 13.2–17.1)
LDLC SERPL CALC-MCNC: 78 MG/DL (CALC)
LYMPHOCYTES # BLD AUTO: 1532 CELLS/UL (ref 850–3900)
LYMPHOCYTES NFR BLD AUTO: 19.9 %
MCH RBC QN AUTO: 32.3 PG (ref 27–33)
MCHC RBC AUTO-ENTMCNC: 33.5 G/DL (ref 32–36)
MCV RBC AUTO: 96.3 FL (ref 80–100)
MONOCYTES # BLD AUTO: 678 CELLS/UL (ref 200–950)
MONOCYTES NFR BLD AUTO: 8.8 %
NEUTROPHILS # BLD AUTO: 4882 CELLS/UL (ref 1500–7800)
NEUTROPHILS NFR BLD AUTO: 63.4 %
NONHDLC SERPL-MCNC: 98 MG/DL (CALC)
PLATELET # BLD AUTO: 196 THOUSAND/UL (ref 140–400)
PMV BLD REES-ECKER: 10.2 FL (ref 7.5–12.5)
POTASSIUM SERPL-SCNC: 4.2 MMOL/L (ref 3.5–5.3)
PROT SERPL-MCNC: 6.7 G/DL (ref 6.1–8.1)
RBC # BLD AUTO: 4.31 MILLION/UL (ref 4.2–5.8)
SODIUM SERPL-SCNC: 140 MMOL/L (ref 135–146)
T4 FREE SERPL-MCNC: 1.2 NG/DL (ref 0.8–1.8)
TRIGL SERPL-MCNC: 118 MG/DL
TSH SERPL-ACNC: 4.77 MIU/L (ref 0.4–4.5)
WBC # BLD AUTO: 7.7 THOUSAND/UL (ref 3.8–10.8)

## 2021-10-11 ENCOUNTER — PATIENT MESSAGE (OUTPATIENT)
Dept: FAMILY MEDICINE | Facility: CLINIC | Age: 81
End: 2021-10-11

## 2021-11-10 ENCOUNTER — TELEPHONE (OUTPATIENT)
Dept: OPTOMETRY | Facility: CLINIC | Age: 81
End: 2021-11-10
Payer: MEDICARE

## 2021-11-29 ENCOUNTER — TELEPHONE (OUTPATIENT)
Dept: FAMILY MEDICINE | Facility: CLINIC | Age: 81
End: 2021-11-29
Payer: MEDICARE

## 2021-11-29 DIAGNOSIS — R42 DIZZINESS: Primary | ICD-10-CM

## 2021-12-03 ENCOUNTER — PATIENT MESSAGE (OUTPATIENT)
Dept: FAMILY MEDICINE | Facility: CLINIC | Age: 81
End: 2021-12-03
Payer: MEDICARE

## 2021-12-20 RX ORDER — LORAZEPAM 0.5 MG/1
TABLET ORAL
Qty: 90 TABLET | Refills: 3 | Status: SHIPPED | OUTPATIENT
Start: 2021-12-20 | End: 2022-04-18

## 2022-02-07 ENCOUNTER — TELEPHONE (OUTPATIENT)
Dept: INTERNAL MEDICINE | Facility: CLINIC | Age: 82
End: 2022-02-07
Payer: MEDICARE

## 2022-02-07 NOTE — LETTER
AUTHORIZATION FOR RELEASE OF   CONFIDENTIAL INFORMATION    Dear Dr. Lawton,    We are seeing Kenyon Hunt, date of birth 1940, in the clinic at [facility]. Italia Ramírez MD is the patient's PCP. Kenyon Hunt has an outstanding lab/procedure at the time we reviewed his chart. In order to help keep his health information updated, he has authorized us to request the following medical record(s):        (  )  MAMMOGRAM                                      (  )  COLONOSCOPY      (  )  PAP SMEAR                                          (X  )  OUTSIDE LAB RESULTS     (  )  DEXA SCAN                                          (  )  EYE EXAM            (  )  FOOT EXAM                                          (  )  ENTIRE RECORD     (  )  OUTSIDE IMMUNIZATIONS                 ( X )Office visit regarding pace maker.          Please fax records to [facility], Jenna C Jordan, MD, [phone]     If you have any questions, please contact Katrin at [phone].           Patient Name: Kenyon Hunt  : 1940  Patient Phone #: [phone]

## 2022-02-07 NOTE — TELEPHONE ENCOUNTER
----- Message from Princess tAkinson sent at 2/7/2022  1:52 PM CST -----  Contact: Self/[phone]  Patient is returning a phone call.  Who left a message for the patient: Katrin  Does patient know what this is regarding:  yes  Would you like a call back, or a response through your [facility] portal?:   call back   Comments:

## 2022-02-07 NOTE — TELEPHONE ENCOUNTER
----- Message from Nakita Rodgers sent at 2/7/2022 12:05 PM CST -----  Contact: [phone]  Pt called to advise that he  is having a pace maker put in next Monday and would like to discuss with the provider. He would also like the provider to check the labs he had done at [facility] and would like to know if it is okay to use as his labs for the year. Please Advise

## 2022-02-07 NOTE — TELEPHONE ENCOUNTER
Patient is getting a pace maker placed with Dr. Lawton on Friday. Had labs with Dr. Lawton on Monday, I will get a copy.

## 2022-02-24 LAB
ALBUMIN SERPL-MCNC: 4 G/DL (ref 3.6–5.1)
ALBUMIN/GLOB SERPL: 1.5 (CALC) (ref 1–2.5)
ALP SERPL-CCNC: 63 U/L (ref 35–144)
ALT SERPL-CCNC: 13 U/L (ref 9–46)
AST SERPL-CCNC: 18 U/L (ref 10–35)
BASOPHILS # BLD AUTO: 50 CELLS/UL (ref 0–200)
BASOPHILS NFR BLD AUTO: 0.6 %
BILIRUB SERPL-MCNC: 0.4 MG/DL (ref 0.2–1.2)
BUN SERPL-MCNC: 18 MG/DL (ref 7–25)
BUN/CREAT SERPL: ABNORMAL (CALC) (ref 6–22)
CALCIUM SERPL-MCNC: 10 MG/DL (ref 8.6–10.3)
CHLORIDE SERPL-SCNC: 105 MMOL/L (ref 98–110)
CHOLEST SERPL-MCNC: 159 MG/DL
CHOLEST/HDLC SERPL: 2.4 (CALC)
CO2 SERPL-SCNC: 30 MMOL/L (ref 20–32)
CREAT SERPL-MCNC: 1.05 MG/DL (ref 0.7–1.11)
EOSINOPHIL # BLD AUTO: 504 CELLS/UL (ref 15–500)
EOSINOPHIL NFR BLD AUTO: 6 %
ERYTHROCYTE [DISTWIDTH] IN BLOOD BY AUTOMATED COUNT: 13 % (ref 11–15)
GLOBULIN SER CALC-MCNC: 2.7 G/DL (CALC) (ref 1.9–3.7)
GLUCOSE SERPL-MCNC: 100 MG/DL (ref 65–99)
HCT VFR BLD AUTO: 43.1 % (ref 38.5–50)
HDLC SERPL-MCNC: 65 MG/DL
HGB BLD-MCNC: 14.6 G/DL (ref 13.2–17.1)
LDLC SERPL CALC-MCNC: 77 MG/DL (CALC)
LYMPHOCYTES # BLD AUTO: 1520 CELLS/UL (ref 850–3900)
LYMPHOCYTES NFR BLD AUTO: 18.1 %
MCH RBC QN AUTO: 31.3 PG (ref 27–33)
MCHC RBC AUTO-ENTMCNC: 33.9 G/DL (ref 32–36)
MCV RBC AUTO: 92.3 FL (ref 80–100)
MONOCYTES # BLD AUTO: 605 CELLS/UL (ref 200–950)
MONOCYTES NFR BLD AUTO: 7.2 %
NEUTROPHILS # BLD AUTO: 5720 CELLS/UL (ref 1500–7800)
NEUTROPHILS NFR BLD AUTO: 68.1 %
NONHDLC SERPL-MCNC: 94 MG/DL (CALC)
PLATELET # BLD AUTO: 208 THOUSAND/UL (ref 140–400)
PMV BLD REES-ECKER: 10 FL (ref 7.5–12.5)
POTASSIUM SERPL-SCNC: 5.1 MMOL/L (ref 3.5–5.3)
PROT SERPL-MCNC: 6.7 G/DL (ref 6.1–8.1)
RBC # BLD AUTO: 4.67 MILLION/UL (ref 4.2–5.8)
SODIUM SERPL-SCNC: 143 MMOL/L (ref 135–146)
TRIGL SERPL-MCNC: 91 MG/DL
WBC # BLD AUTO: 8.4 THOUSAND/UL (ref 3.8–10.8)

## 2022-02-25 ENCOUNTER — OFFICE VISIT (OUTPATIENT)
Dept: INTERNAL MEDICINE | Facility: CLINIC | Age: 82
End: 2022-02-25
Payer: MEDICARE

## 2022-02-25 VITALS
WEIGHT: 172.31 LBS | OXYGEN SATURATION: 96 % | BODY MASS INDEX: 28.67 KG/M2 | DIASTOLIC BLOOD PRESSURE: 82 MMHG | TEMPERATURE: 98 F | SYSTOLIC BLOOD PRESSURE: 136 MMHG | HEART RATE: 62 BPM

## 2022-02-25 DIAGNOSIS — F41.9 ANXIETY: ICD-10-CM

## 2022-02-25 DIAGNOSIS — E78.2 MIXED HYPERLIPIDEMIA: ICD-10-CM

## 2022-02-25 DIAGNOSIS — I65.23 BILATERAL CAROTID ARTERY STENOSIS: Primary | ICD-10-CM

## 2022-02-25 DIAGNOSIS — Z86.73 HISTORY OF CVA (CEREBROVASCULAR ACCIDENT): ICD-10-CM

## 2022-02-25 DIAGNOSIS — G89.29 CHRONIC BILATERAL LOW BACK PAIN WITHOUT SCIATICA: ICD-10-CM

## 2022-02-25 DIAGNOSIS — Z85.46 HISTORY OF PROSTATE CANCER: ICD-10-CM

## 2022-02-25 DIAGNOSIS — I10 ESSENTIAL HYPERTENSION: ICD-10-CM

## 2022-02-25 DIAGNOSIS — I25.10 ARTERIOSCLEROSIS OF CORONARY ARTERY: ICD-10-CM

## 2022-02-25 DIAGNOSIS — M54.50 CHRONIC BILATERAL LOW BACK PAIN WITHOUT SCIATICA: ICD-10-CM

## 2022-02-25 DIAGNOSIS — R00.1 SYMPTOMATIC BRADYCARDIA: ICD-10-CM

## 2022-02-25 DIAGNOSIS — K21.9 GASTROESOPHAGEAL REFLUX DISEASE WITHOUT ESOPHAGITIS: ICD-10-CM

## 2022-02-25 DIAGNOSIS — Z95.0 PACEMAKER: ICD-10-CM

## 2022-02-25 PROCEDURE — 1159F MED LIST DOCD IN RCRD: CPT | Mod: CPTII,S$GLB,, | Performed by: INTERNAL MEDICINE

## 2022-02-25 PROCEDURE — 3075F SYST BP GE 130 - 139MM HG: CPT | Mod: CPTII,S$GLB,, | Performed by: INTERNAL MEDICINE

## 2022-02-25 PROCEDURE — 1159F PR MEDICATION LIST DOCUMENTED IN MEDICAL RECORD: ICD-10-PCS | Mod: CPTII,S$GLB,, | Performed by: INTERNAL MEDICINE

## 2022-02-25 PROCEDURE — 99214 PR OFFICE/OUTPT VISIT, EST, LEVL IV, 30-39 MIN: ICD-10-PCS | Mod: S$GLB,,, | Performed by: INTERNAL MEDICINE

## 2022-02-25 PROCEDURE — 3075F PR MOST RECENT SYSTOLIC BLOOD PRESS GE 130-139MM HG: ICD-10-PCS | Mod: CPTII,S$GLB,, | Performed by: INTERNAL MEDICINE

## 2022-02-25 PROCEDURE — 3079F PR MOST RECENT DIASTOLIC BLOOD PRESSURE 80-89 MM HG: ICD-10-PCS | Mod: CPTII,S$GLB,, | Performed by: INTERNAL MEDICINE

## 2022-02-25 PROCEDURE — 99999 PR PBB SHADOW E&M-EST. PATIENT-LVL III: ICD-10-PCS | Mod: PBBFAC,,, | Performed by: INTERNAL MEDICINE

## 2022-02-25 PROCEDURE — 3079F DIAST BP 80-89 MM HG: CPT | Mod: CPTII,S$GLB,, | Performed by: INTERNAL MEDICINE

## 2022-02-25 PROCEDURE — 99214 OFFICE O/P EST MOD 30 MIN: CPT | Mod: S$GLB,,, | Performed by: INTERNAL MEDICINE

## 2022-02-25 PROCEDURE — 99999 PR PBB SHADOW E&M-EST. PATIENT-LVL III: CPT | Mod: PBBFAC,,, | Performed by: INTERNAL MEDICINE

## 2022-02-25 RX ORDER — ESCITALOPRAM OXALATE 20 MG/1
20 TABLET ORAL DAILY
Qty: 90 TABLET | Refills: 3 | Status: SHIPPED | OUTPATIENT
Start: 2022-02-25 | End: 2023-03-13 | Stop reason: SDUPTHER

## 2022-02-25 RX ORDER — ASPIRIN 81 MG/1
81 TABLET ORAL DAILY
Qty: 90 TABLET | Refills: 3
Start: 2022-02-25 | End: 2024-03-18

## 2022-02-25 RX ORDER — FUROSEMIDE 20 MG/1
20 TABLET ORAL DAILY PRN
Qty: 90 TABLET | Refills: 3 | Status: SHIPPED | OUTPATIENT
Start: 2022-02-25 | End: 2022-04-05

## 2022-02-25 RX ORDER — TAMSULOSIN HYDROCHLORIDE 0.4 MG/1
0.4 CAPSULE ORAL DAILY
Qty: 90 CAPSULE | Refills: 3 | Status: SHIPPED | OUTPATIENT
Start: 2022-02-25 | End: 2023-05-11

## 2022-02-25 RX ORDER — ATORVASTATIN CALCIUM 10 MG/1
10 TABLET, FILM COATED ORAL DAILY
Qty: 90 TABLET | Refills: 3 | Status: SHIPPED | OUTPATIENT
Start: 2022-02-25 | End: 2023-03-13 | Stop reason: SDUPTHER

## 2022-02-25 NOTE — ASSESSMENT & PLAN NOTE
Stable on Wellbutrin 200 mg BID, Lexapro 20 mg daily, Lorazepam PRN.  No SI/HI/panic attacks.  Anxiety has been worse since COVID 19 started.   Has panic attacks on occasion. Takes ativan most nights, occasionally takes during the day.

## 2022-02-25 NOTE — ASSESSMENT & PLAN NOTE
Stable on lipitor, no myalgias  The ASCVD Risk score ([place] WEBBER Jr., et al., 2013) failed to calculate for the following reasons:    The 2013 ASCVD risk score is only valid for ages 40 to 79    The patient has a prior MI or stroke diagnosis

## 2022-02-25 NOTE — PROGRESS NOTES
[facility] Note    Chief Complaint      Chief Complaint   Patient presents with    Follow-up     History of Present Illness      Kenyon Hunt is a 81 y.o. male who presents today for follow up of HTN.  Patient comes to appointment with spouse.  Cards: [place], Ortho: Cj    Had pacemaker implantation on 2/14/2022, was doing well after, walked better and wasn't as dizzy.  This only lasted about 5 days, seeing Dr. Lawton next week.  Had some mild edema in right leg, SOB but this has resolved since having pacemaker implantation.  Feels stuffy/runny nose   BP and pulse have been ok at home.    Yesterday, got dizzy and fell while walking with walker, fell and hit head on stool.  Has small wound on head.  Has not been riding stationary bike or exercising.    Problem List Items Addressed This Visit     Essential hypertension    Current Assessment & Plan     Stable on Amlodipine 5 mg and Losartan 25 mg daily, no CP/SOB/HA.            Relevant Medications    furosemide (LASIX) 20 MG tablet    Chronic bilateral back pain    Current Assessment & Plan     Last MRI L spine on 10/1/19 with Dr. Jimenez.  Had rizotomy in 1/2022, no longer having pain.           Anxiety    Current Assessment & Plan     Stable on Wellbutrin 200 mg BID, Lexapro 20 mg daily, Lorazepam PRN.  No SI/HI/panic attacks.  Anxiety has been worse since COVID 19 started.   Has panic attacks on occasion. Takes ativan most nights, occasionally takes during the day.           Relevant Medications    tamsulosin (FLOMAX) 0.4 mg Cap    EScitalopram oxalate (LEXAPRO) 20 MG tablet    Arteriosclerosis of coronary artery    Current Assessment & Plan     On ASA and statin.  No CP/SOB.  Sees Dr. Lawton at [facility].             History of prostate cancer    Current Assessment & Plan     On Flomax.  No sisues at present.           Hyperlipidemia    Current Assessment & Plan     Stable on lipitor, no myalgias  The ASCVD Risk score ([place]  , et al., 2013) failed to calculate for the following reasons:    The 2013 ASCVD risk score is only valid for ages 40 to 79    The patient has a prior MI or stroke diagnosis            Relevant Medications    atorvastatin (LIPITOR) 10 MG tablet    Gastroesophageal reflux disease without esophagitis    Current Assessment & Plan     Stable on protonix, sees Dr. Lowery.  No issues at present           History of CVA (cerebrovascular accident)    Current Assessment & Plan     After 2/2020, residual dizziness and optho issues, on ASA and lipitor every other day.           Relevant Medications    aspirin (ECOTRIN) 81 MG EC tablet    Pacemaker    Symptomatic bradycardia    Current Assessment & Plan     S/p pacemaker with Dr. Vinson (cardiologist is Dr. Lawton), doing better since implant.             Other Visit Diagnoses     Bilateral carotid artery stenosis    -  Primary          Health Maintenance   Topic Date Due    TETANUS VACCINE  Never done    Aspirin/Antiplatelet Therapy  Never done    Lipid Panel  02/23/2027       History reviewed. No pertinent past medical history.    Past Surgical History:   Procedure Laterality Date    pace maker      SHOULDER SURGERY         family history is not on file.    Social History     Tobacco Use    Smoking status: Former Smoker    Smokeless tobacco: Never Used   Substance Use Topics    Alcohol use: Yes       Review of Systems   Constitutional: Negative for chills and fever.   HENT: Negative for sore throat.    Respiratory: Negative for cough and shortness of breath.    Cardiovascular: Negative for chest pain and palpitations.   Gastrointestinal: Negative for constipation, diarrhea, nausea and vomiting.   Genitourinary: Negative for dysuria and hematuria.   Musculoskeletal: Negative for falls.   Neurological: Negative for headaches.        Outpatient Encounter Medications as of 2/25/2022   Medication Sig Dispense Refill    ascorbic acid, vitamin C, (VITAMIN C) 1000 MG  tablet Take 1,000 mg by mouth.      baclofen (LIORESAL) 5 mg Tab tablet 5 mg (one tablet) in the morning and 10 mg (two tablets) at night 90 tablet 11    buPROPion (WELLBUTRIN SR) 200 MG SR12 Take 200 mg by mouth 2 (two) times daily.  4    coenzyme Q10 100 mg capsule Take 100 mg by mouth.      ergocalciferol, vitamin D2, (VITAMIN D ORAL) Take 1,000 Int'l Units by mouth 2 (two) times a day.      fluticasone propionate (FLONASE) 50 mcg/actuation nasal spray 1 spray (50 mcg total) by Each Nostril route once daily. 32 g 11    irbesartan (AVAPRO) 150 MG tablet Take 150 mg by mouth 2 (two) times a day.      LORazepam (ATIVAN) 0.5 MG tablet TAKE 1 TABLET(0.5 MG) BY MOUTH EVERY 8 HOURS AS NEEDED FOR ANXIETY 90 tablet 3    melatonin-pyridoxine HCl, B6, 5-10 mg TbIE Take 5 mg by mouth.      MILK THISTLE ORAL Take by mouth.      multivit with minerals/lutein (MULTIVITAMIN 50 PLUS ORAL) Take by mouth.      pantoprazole (PROTONIX) 40 MG tablet Take 40 mg by mouth once daily.      POTASSIUM ORAL Take by mouth.      TURMERIC ORAL Take by mouth.      zinc 50 mg Tab Take by mouth.      [DISCONTINUED] aspirin (ECOTRIN) 81 MG EC tablet Take 1 tablet (81 mg total) by mouth once daily. 90 tablet 3    [DISCONTINUED] atorvastatin (LIPITOR) 10 MG tablet TAKE 1 TABLET(10 MG) BY MOUTH EVERY DAY 90 tablet 1    [DISCONTINUED] EScitalopram oxalate (LEXAPRO) 20 MG tablet Take 1 tablet (20 mg total) by mouth once daily. 90 tablet 3    [DISCONTINUED] tamsulosin (FLOMAX) 0.4 mg Cap TAKE 1 CAPSULE(0.4 MG) BY MOUTH EVERY DAY 90 capsule 3    aspirin (ECOTRIN) 81 MG EC tablet Take 1 tablet (81 mg total) by mouth once daily. 90 tablet 3    atorvastatin (LIPITOR) 10 MG tablet Take 1 tablet (10 mg total) by mouth once daily. 90 tablet 3    EScitalopram oxalate (LEXAPRO) 20 MG tablet Take 1 tablet (20 mg total) by mouth once daily. 90 tablet 3    furosemide (LASIX) 20 MG tablet Take 1 tablet (20 mg total) by mouth daily as needed  (swelling). 90 tablet 3    tamsulosin (FLOMAX) 0.4 mg Cap Take 1 capsule (0.4 mg total) by mouth once daily. 90 capsule 3    [DISCONTINUED] amLODIPine (NORVASC) 5 MG tablet Take 5 mg by mouth once daily.      [DISCONTINUED] aspirin-calcium carbonate 81 mg-300 mg calcium(777 mg) Tab Take 81 mg by mouth.      [DISCONTINUED] furosemide (LASIX) 20 MG tablet Take 1 tablet (20 mg total) by mouth daily as needed (swelling). (Patient not taking: Reported on 2/25/2022) 90 tablet 3    [DISCONTINUED] loratadine (CLARITIN) 10 mg tablet Take 10 mg by mouth.      [DISCONTINUED] losartan (COZAAR) 25 MG tablet Take 25 mg by mouth once daily.       No facility-administered encounter medications on file as of 2/25/2022.        Review of patient's allergies indicates:   Allergen Reactions    Carbamazepine        Physical Exam      Vital Signs  Temp: 98.3 °F (36.8 °C)  Pulse: 62  SpO2: 96 %  BP: 136/82  Height and Weight  Weight: 78.1 kg (172 lb 4.6 oz)]    Physical Exam  Constitutional:       Appearance: He is well-developed.   HENT:      Head: Normocephalic and atraumatic.   Neck:      Thyroid: No thyromegaly.   Cardiovascular:      Rate and Rhythm: Normal rate and regular rhythm.      Heart sounds: No murmur heard.  Pulmonary:      Effort: Pulmonary effort is normal. No respiratory distress.      Breath sounds: Normal breath sounds.   Abdominal:      General: There is no distension.      Palpations: Abdomen is soft.      Tenderness: There is no abdominal tenderness.   Skin:     General: Skin is warm and dry.   Neurological:      Mental Status: He is alert and oriented to person, place, and time.   Psychiatric:         Behavior: Behavior normal.          Laboratory:  CBC:  Recent Labs   Lab Result Units 02/23/22  0831   WBC Thousand/uL 8.4   RBC Million/uL 4.67   Hemoglobin g/dL 14.6   Hematocrit % 43.1   Platelets Thousand/uL 208   MCV fL 92.3   MCH pg 31.3   MCHC g/dL 33.9     CMP:  Recent Labs   Lab Result Units  02/23/22  0831   Glucose mg/dL 100*   Calcium mg/dL 10.0   Albumin g/dL 4.0   Total Protein g/dL 6.7   Sodium mmol/L 143   Potassium mmol/L 5.1   CO2 mmol/L 30   Chloride mmol/L 105   BUN mg/dL 18   ALT U/L 13   AST U/L 18   Total Bilirubin mg/dL 0.4     URINALYSIS:  No results for input(s): COLORU, CLARITYU, SPECGRAV, PHUR, PROTEINUA, GLUCOSEU, BILIRUBINCON, BLOODU, WBCU, RBCU, BACTERIA, MUCUS, NITRITE, LEUKOCYTESUR, UROBILINOGEN, HYALINECASTS in the last 2160 hours.   LIPIDS:  Recent Labs   Lab Result Units 02/23/22  0831   HDL mg/dL 65   Cholesterol mg/dL 159   Triglycerides mg/dL 91   LDL Cholesterol mg/dL (calc) 77   HDL/Cholesterol Ratio (calc) 2.4   Non HDL Chol. (LDL+VLDL) mg/dL (calc) 94     TSH:  No results for input(s): TSH in the last 2160 hours.  A1C:  No results for input(s): HGBA1C in the last 2160 hours.    Radiology:  No results found in the last 30 days.     Assessment/Plan     Kenyon Hunt is a 81 y.o.male with:    1. Bilateral carotid artery stenosis    2. Pacemaker    3. Chronic bilateral low back pain without sciatica    4. Gastroesophageal reflux disease without esophagitis    5. History of prostate cancer    6. Arteriosclerosis of coronary artery    7. Essential hypertension  - furosemide (LASIX) 20 MG tablet; Take 1 tablet (20 mg total) by mouth daily as needed (swelling).  Dispense: 90 tablet; Refill: 3    8. Mixed hyperlipidemia  - atorvastatin (LIPITOR) 10 MG tablet; Take 1 tablet (10 mg total) by mouth once daily.  Dispense: 90 tablet; Refill: 3    9. Anxiety  - tamsulosin (FLOMAX) 0.4 mg Cap; Take 1 capsule (0.4 mg total) by mouth once daily.  Dispense: 90 capsule; Refill: 3  - EScitalopram oxalate (LEXAPRO) 20 MG tablet; Take 1 tablet (20 mg total) by mouth once daily.  Dispense: 90 tablet; Refill: 3    10. History of CVA (cerebrovascular accident)  - aspirin (ECOTRIN) 81 MG EC tablet; Take 1 tablet (81 mg total) by mouth once daily.  Dispense: 90 tablet; Refill: 3    11.  Symptomatic bradycardia    -counseled on COVID booster   -counseled on increasing exercise  -Continue current medications and maintain follow up with specialists.    -Follow up in about 6 months (around 8/25/2022) for follow up of medical problems.       Italia Ramírez MD  [facility]

## 2022-02-28 ENCOUNTER — TELEPHONE (OUTPATIENT)
Dept: OPHTHALMOLOGY | Facility: CLINIC | Age: 82
End: 2022-02-28
Payer: MEDICARE

## 2022-03-01 ENCOUNTER — PATIENT MESSAGE (OUTPATIENT)
Dept: INTERNAL MEDICINE | Facility: CLINIC | Age: 82
End: 2022-03-01
Payer: MEDICARE

## 2022-03-02 RX ORDER — BUPROPION HYDROCHLORIDE 200 MG/1
200 TABLET, EXTENDED RELEASE ORAL 2 TIMES DAILY
Qty: 180 TABLET | Refills: 3 | Status: SHIPPED | OUTPATIENT
Start: 2022-03-02 | End: 2023-03-13 | Stop reason: SDUPTHER

## 2022-03-14 ENCOUNTER — TELEPHONE (OUTPATIENT)
Dept: INTERNAL MEDICINE | Facility: CLINIC | Age: 82
End: 2022-03-14
Payer: MEDICARE

## 2022-03-14 DIAGNOSIS — R42 DIZZINESS: ICD-10-CM

## 2022-03-14 DIAGNOSIS — R29.6 FREQUENT FALLS: Primary | ICD-10-CM

## 2022-03-14 NOTE — TELEPHONE ENCOUNTER
Pt went to ER given Antivert, feels like it did not work. He thinks he did not have Vertigo he thinks he should see Neuro? He would like to see a Neuro for falling

## 2022-03-14 NOTE — TELEPHONE ENCOUNTER
----- Message from Radha Johnson sent at 3/14/2022  3:49 PM CDT -----  Regarding: returned call  Contact: payal  [phone]  Patient is returning a phone call.  Who left a message for the patient: Katrin GOOD  Does patient know what this is regarding:    Would you like a call back, or a response through your [facility] portal?:   #please call  Comments:

## 2022-03-14 NOTE — TELEPHONE ENCOUNTER
----- Message from Awilda Mcclelland sent at 3/14/2022  3:31 PM CDT -----  Contact: self/[phone]  Pt called in regards to going to the er due to trouble walking and leg issue and due to vertigo. They gave him Antivert, no help at all. The er dr advise him to f/u with dr/referral to urologist.  Pt would like a call back.    Please advise

## 2022-03-28 ENCOUNTER — TELEPHONE (OUTPATIENT)
Dept: NEUROLOGY | Facility: CLINIC | Age: 82
End: 2022-03-28
Payer: MEDICARE

## 2022-03-28 NOTE — TELEPHONE ENCOUNTER
Called and spoke with pt. Explained Dr. Constantino will not be in clinic on 04/19/22. Offered 7 day release with  Dr. Felicita Hargrove on 04/22/22 at 330. Accepted offer. Will schedule once released.

## 2022-03-29 ENCOUNTER — TELEPHONE (OUTPATIENT)
Dept: INTERNAL MEDICINE | Facility: CLINIC | Age: 82
End: 2022-03-29
Payer: MEDICARE

## 2022-03-29 ENCOUNTER — NURSE TRIAGE (OUTPATIENT)
Dept: ADMINISTRATIVE | Facility: CLINIC | Age: 82
End: 2022-03-29
Payer: MEDICARE

## 2022-03-29 NOTE — TELEPHONE ENCOUNTER
Patient advised of message and informed patient twice about going to ER per Dr. Ramírez. Patient states he took half of the baclofen and he is feeling better and does not having anymore symptoms. Asked patient if he was going to go to ER and patient states no he is going to wait. Did say if his symptoms started again her would go to ER

## 2022-03-29 NOTE — TELEPHONE ENCOUNTER
Pt is stating he has Been taking it for over a year.He was having Dizziness, lethargic, Confusion, Starting x 5 days, Stopped baclofen yesterday, now I have Incontinence, Slurred speech       He says he is having these symptoms now some dizziness and lethargic, slight confusion, and some slurred speech.      REFUSING ER see nurse triage call as well, I again advised ER he REFUSED. Says wants to know if taking the meds can cause these side effects and by stopping can cause these side effects.

## 2022-03-29 NOTE — TELEPHONE ENCOUNTER
----- Message from Magalie Bauergham sent at 3/29/2022 11:35 AM CDT -----  Regarding: Need Medical Advice  Type:  Needs Medical Advice    Who Called: Patient states need to speak with nurse   Patient states experiencing severe  withdrawal symptoms from medication   Baclofen after stopping the medicine  Symptoms (please be specific):   How long has patient had these symptoms:      Would the patient rather a call back or a response via [facility]? call  Best Call Back Number: [phone]  Additional Information: I also connected patient over to on call Triage nurse

## 2022-03-29 NOTE — TELEPHONE ENCOUNTER
OOC Rn Transferred to me from , have withdrawal symptoms from stopping baclofen.  Been taking it for over a year.   Now, I have Dizziness, lethargic,   Confusion,   Starting x 5 days,   Stopped baclofen yesterday, now I have   Incontinence.   Dr. Ruiz.  Slurred speech    Care advise is to call 911 now.  Ayleen JULIO and wanted to know if he could just get a message to his Dr.  He Stated Dr. Ruiz is not in office he already tried to reach her.   Advised again to call 911 now.  Patient states just wants to talk to .        Reason for Disposition   Difficult to awaken or acting confused (e.g., disoriented, slurred speech)    Protocols used: [facility] NEUROLOGIC DEFICIT-A-[facility]

## 2022-03-30 ENCOUNTER — PATIENT MESSAGE (OUTPATIENT)
Dept: OPHTHALMOLOGY | Facility: CLINIC | Age: 82
End: 2022-03-30
Payer: MEDICARE

## 2022-03-30 ENCOUNTER — TELEPHONE (OUTPATIENT)
Dept: OPHTHALMOLOGY | Facility: CLINIC | Age: 82
End: 2022-03-30
Payer: MEDICARE

## 2022-03-30 NOTE — TELEPHONE ENCOUNTER
Advise to continue half of dose of Baclofen until he see Dr. Ruiz on 4/5/2022 per Dr. Ruiz.   ----- Message from Avis Ruiz MD sent at 3/30/2022  2:17 PM CDT -----  Regarding: RE: questions  Looks like from the notes, he halved his baclofen dose and feels better. He should continue taking half the dose and I see him in follow up soon.     LOLLY  ----- Message -----  From: EVIN Sprague  Sent: 3/30/2022   2:10 PM CDT  To: Avis Ruiz MD  Subject: RE: questions                                    I am not sure which dose he is on. I called patient and left message.     Martha  ----- Message -----  From: Avis Ruiz MD  Sent: 3/30/2022   8:18 AM CDT  To: EVIN Sprague  Subject: RE: questions                                    Okay to stop taking. Should taper off. What dose is he currently on?    LOLLY  ----- Message -----  From: EVIN Sprague  Sent: 3/29/2022   5:08 PM CDT  To: Avis Ruiz MD  Subject: FW: questions                                      ----- Message -----  From: Carmelita Sutton  Sent: 3/29/2022  11:27 AM CDT  To: Joseph Liu  Subject: questions                                        Patient states that he is having a real bad reaction to the:  baclofen (LIORESAL) 5 mg Tab tablet.  He says that he is having extreme dizziness, difficulty concentrating, increased urination, confusion and lethargy.  He would like to know if it's ok to stop taking it.      Kenyon @ [phone]

## 2022-03-31 ENCOUNTER — TELEPHONE (OUTPATIENT)
Dept: NEUROLOGY | Facility: CLINIC | Age: 82
End: 2022-03-31
Payer: MEDICARE

## 2022-03-31 NOTE — TELEPHONE ENCOUNTER
----- Message from Hailey Perez MA sent at 3/30/2022  4:36 PM CDT -----    ----- Message -----  From: Hailey Perez MA  Sent: 3/28/2022   4:57 PM CDT  To: Hailey Perez MA      ----- Message -----  From: Karen Hudson  Sent: 3/28/2022   3:27 PM CDT  To: Dougie Grimes Staff    Who Called: Patient    What is the reqeust in detail: Requesting call back to confirm he'll take the 04/22/22 with Dr. Felicita Hargrove. Please advise.     Can the clinic reply by [facility]? No    Best Call Back Number: [phone]    Additional Information:

## 2022-04-05 ENCOUNTER — OFFICE VISIT (OUTPATIENT)
Dept: OPHTHALMOLOGY | Facility: CLINIC | Age: 82
End: 2022-04-05
Payer: MEDICARE

## 2022-04-05 DIAGNOSIS — Z86.73 HISTORY OF CVA (CEREBROVASCULAR ACCIDENT): Primary | ICD-10-CM

## 2022-04-05 DIAGNOSIS — H55.09 DOWNBEAT NYSTAGMUS: ICD-10-CM

## 2022-04-05 DIAGNOSIS — H53.2 DIPLOPIA: ICD-10-CM

## 2022-04-05 DIAGNOSIS — H51.8 SKEW DEVIATION: ICD-10-CM

## 2022-04-05 DIAGNOSIS — G45.9 TRANSIENT CEREBRAL ISCHEMIA, UNSPECIFIED TYPE: ICD-10-CM

## 2022-04-05 DIAGNOSIS — R42 DIZZINESS AND GIDDINESS: ICD-10-CM

## 2022-04-05 PROCEDURE — 99215 PR OFFICE/OUTPT VISIT, EST, LEVL V, 40-54 MIN: ICD-10-PCS | Mod: S$GLB,,, | Performed by: STUDENT IN AN ORGANIZED HEALTH CARE EDUCATION/TRAINING PROGRAM

## 2022-04-05 PROCEDURE — 99999 PR PBB SHADOW E&M-EST. PATIENT-LVL III: CPT | Mod: PBBFAC,,, | Performed by: STUDENT IN AN ORGANIZED HEALTH CARE EDUCATION/TRAINING PROGRAM

## 2022-04-05 PROCEDURE — 1126F AMNT PAIN NOTED NONE PRSNT: CPT | Mod: CPTII,S$GLB,, | Performed by: STUDENT IN AN ORGANIZED HEALTH CARE EDUCATION/TRAINING PROGRAM

## 2022-04-05 PROCEDURE — 3288F PR FALLS RISK ASSESSMENT DOCUMENTED: ICD-10-PCS | Mod: CPTII,S$GLB,, | Performed by: STUDENT IN AN ORGANIZED HEALTH CARE EDUCATION/TRAINING PROGRAM

## 2022-04-05 PROCEDURE — 1160F PR REVIEW ALL MEDS BY PRESCRIBER/CLIN PHARMACIST DOCUMENTED: ICD-10-PCS | Mod: CPTII,S$GLB,, | Performed by: STUDENT IN AN ORGANIZED HEALTH CARE EDUCATION/TRAINING PROGRAM

## 2022-04-05 PROCEDURE — 99999 PR PBB SHADOW E&M-EST. PATIENT-LVL III: ICD-10-PCS | Mod: PBBFAC,,, | Performed by: STUDENT IN AN ORGANIZED HEALTH CARE EDUCATION/TRAINING PROGRAM

## 2022-04-05 PROCEDURE — 1126F PR PAIN SEVERITY QUANTIFIED, NO PAIN PRESENT: ICD-10-PCS | Mod: CPTII,S$GLB,, | Performed by: STUDENT IN AN ORGANIZED HEALTH CARE EDUCATION/TRAINING PROGRAM

## 2022-04-05 PROCEDURE — 3288F FALL RISK ASSESSMENT DOCD: CPT | Mod: CPTII,S$GLB,, | Performed by: STUDENT IN AN ORGANIZED HEALTH CARE EDUCATION/TRAINING PROGRAM

## 2022-04-05 PROCEDURE — 1100F PTFALLS ASSESS-DOCD GE2>/YR: CPT | Mod: CPTII,S$GLB,, | Performed by: STUDENT IN AN ORGANIZED HEALTH CARE EDUCATION/TRAINING PROGRAM

## 2022-04-05 PROCEDURE — 99215 OFFICE O/P EST HI 40 MIN: CPT | Mod: S$GLB,,, | Performed by: STUDENT IN AN ORGANIZED HEALTH CARE EDUCATION/TRAINING PROGRAM

## 2022-04-05 PROCEDURE — 1100F PR PT FALLS ASSESS DOC 2+ FALLS/FALL W/INJURY/YR: ICD-10-PCS | Mod: CPTII,S$GLB,, | Performed by: STUDENT IN AN ORGANIZED HEALTH CARE EDUCATION/TRAINING PROGRAM

## 2022-04-05 PROCEDURE — 1160F RVW MEDS BY RX/DR IN RCRD: CPT | Mod: CPTII,S$GLB,, | Performed by: STUDENT IN AN ORGANIZED HEALTH CARE EDUCATION/TRAINING PROGRAM

## 2022-04-05 PROCEDURE — 1159F PR MEDICATION LIST DOCUMENTED IN MEDICAL RECORD: ICD-10-PCS | Mod: CPTII,S$GLB,, | Performed by: STUDENT IN AN ORGANIZED HEALTH CARE EDUCATION/TRAINING PROGRAM

## 2022-04-05 PROCEDURE — 1159F MED LIST DOCD IN RCRD: CPT | Mod: CPTII,S$GLB,, | Performed by: STUDENT IN AN ORGANIZED HEALTH CARE EDUCATION/TRAINING PROGRAM

## 2022-04-05 RX ORDER — MULTIVITAMIN
1 TABLET ORAL DAILY
COMMUNITY
End: 2022-04-05 | Stop reason: SDUPTHER

## 2022-04-05 RX ORDER — CALCIUM CARBONATE 600 MG
600 TABLET ORAL 2 TIMES DAILY
COMMUNITY

## 2022-04-05 RX ORDER — CHOLECALCIFEROL (VITAMIN D3) 25 MCG
1000 TABLET ORAL 2 TIMES DAILY
COMMUNITY
End: 2023-06-29 | Stop reason: ALTCHOICE

## 2022-04-05 RX ORDER — METOPROLOL TARTRATE 25 MG/1
25 TABLET, FILM COATED ORAL 2 TIMES DAILY
COMMUNITY
Start: 2022-01-04

## 2022-04-05 RX ORDER — OMEGA-3 FATTY ACIDS 1000 MG
2 CAPSULE ORAL 2 TIMES DAILY
COMMUNITY
End: 2023-06-29 | Stop reason: ALTCHOICE

## 2022-04-05 RX ORDER — PANTOPRAZOLE SODIUM 20 MG/1
20 TABLET, DELAYED RELEASE ORAL DAILY
COMMUNITY
End: 2023-06-29 | Stop reason: ALTCHOICE

## 2022-04-05 RX ORDER — MELATONIN 5 MG
1 CAPSULE ORAL NIGHTLY
COMMUNITY

## 2022-04-05 NOTE — PROGRESS NOTES
Date:  4/5/2022    ?  Referring Provider:   Dr. Mimi Coats    Chief Complaint:  I saw Kenyon Hunt at the [facility] for neuro-ophthalmic evaluation.   He is a 81 y.o. male with a history of HTN, HLD, prostate cancer, GERD who presents for follow up of nystagmus and oscillopsia.    History:     5/2019 started having dizziness and clumsiness.    2/2020 rotator cuff surgery, since then has had horizontal oscillopsia.  No particular gaze evoked aspect but when he looks to the sides. Diplopia/triple vision occurs just after the oscillopsia. Dizzy like clumsy, not room spinning, not lightheaded. Daughter-in-law reports months of gradual improvement of disequilibrium. Has the most issues when having to turn. Least symptomatic while driving.     [facility] 6 months ago saw neurologist Dr. Cedeno who obtained MRI and told him in the past he had a stroke and has white matter disease    Tried prism glasses and didn't like it.     Pauly 6/7/2021:           8/18/2021  ?  Daldampridine was not covered by insurance. He was started on baclofen and gradually uptitrated to 5 mg QAM and 10 mg QHS. Tolerating well, but some decreased leg strength. He reports distorted vision OD only, difficulty still with reading, losing his place on the page and letters appearing jumbled.    Interval History: 4/5/2022    He began having dizziness and disorientation which was felt to be a side effect of baclofen. He decreased his dosage to 2.5 mg BID about one week ago and reports some improvement in dizziness and confusion but no improvement in balance.     Extreme dizziness and imbalance. Denies eye pain   and f/f. Only notice diplopia vision when turing head to the right.   Diplopia vision is side by side and one on top of the other. Imbalance has   become worst since last visit. No longer experience dizziness. Feel like   he has no feeling in both of his legs.     Med's: Baclofen PO 2.5mg BID      2/2022 EMG  without evidence of neuropathy per patient    Reportedly had US of carotids without significant stenosis    Had pacemaker placed in 2/2022 with some improvement in symptoms.       Current Outpatient Medications   Medication Sig Dispense Refill    ascorbic acid, vitamin C, (VITAMIN C) 1000 MG tablet Take 1,000 mg by mouth.      aspirin (ECOTRIN) 81 MG EC tablet Take 1 tablet (81 mg total) by mouth once daily. 90 tablet 3    atorvastatin (LIPITOR) 10 MG tablet Take 1 tablet (10 mg total) by mouth once daily. 90 tablet 3    baclofen (LIORESAL) 5 mg Tab tablet 5 mg (one tablet) in the morning and 10 mg (two tablets) at night (Patient taking differently: 2.5 mg 2 (two) times daily. 5 mg (one tablet) in the morning and 10 mg (two tablets) at night) 90 tablet 11    buPROPion (WELLBUTRIN SR) 200 MG SR12 Take 1 tablet (200 mg total) by mouth 2 (two) times daily. 180 tablet 3    calcium carbonate (OS-FELICITY) 600 mg calcium (1,500 mg) Tab Take 600 mg by mouth 2 (two) times a day.      coenzyme Q10 100 mg capsule Take 100 mg by mouth.      ergocalciferol, vitamin D2, (VITAMIN D ORAL) Take 1,000 Int'l Units by mouth 2 (two) times a day.      EScitalopram oxalate (LEXAPRO) 20 MG tablet Take 1 tablet (20 mg total) by mouth once daily. 90 tablet 3    fluticasone propionate (FLONASE) 50 mcg/actuation nasal spray 1 spray (50 mcg total) by Each Nostril route once daily. 32 g 11    irbesartan (AVAPRO) 150 MG tablet Take 150 mg by mouth once daily.      LORazepam (ATIVAN) 0.5 MG tablet TAKE 1 TABLET(0.5 MG) BY MOUTH EVERY 8 HOURS AS NEEDED FOR ANXIETY 90 tablet 3    melatonin-pyridoxine HCl, B6, 5-10 mg TbIE Take 5 mg by mouth.      metoprolol tartrate (LOPRESSOR) 25 MG tablet Take 25 mg by mouth 2 (two) times a day.      MILK THISTLE ORAL Take by mouth.      multivit with minerals/lutein (MULTIVITAMIN 50 PLUS ORAL) Take by mouth.      omega-3 fatty acids 1,000 mg Cap Take 2 g by mouth 2 (two) times a day.       pantoprazole (PROTONIX) 20 MG tablet Take 20 mg by mouth once daily.      pantoprazole (PROTONIX) 40 MG tablet Take 40 mg by mouth once daily.      POTASSIUM ORAL Take by mouth.      tamsulosin (FLOMAX) 0.4 mg Cap Take 1 capsule (0.4 mg total) by mouth once daily. 90 capsule 3    TURMERIC ORAL Take by mouth.      vitamin D (VITAMIN D3) 1000 units Tab Take 1,000 Units by mouth 2 (two) times a day.      zinc 50 mg Tab Take by mouth.      furosemide (LASIX) 20 MG tablet Take 1 tablet (20 mg total) by mouth daily as needed (swelling). 90 tablet 3    melatonin 5 mg Cap Take 1 capsule by mouth every evening.      multivitamin (THERAGRAN) per tablet Take 1 tablet by mouth once daily.       No current facility-administered medications for this visit.     Review of patient's allergies indicates:   Allergen Reactions    Carbamazepine Rash     Past Medical History:   Diagnosis Date    Strabismus      Past Surgical History:   Procedure Laterality Date    CATARACT EXTRACTION W/  INTRAOCULAR LENS IMPLANT Bilateral     pace maker      SHOULDER SURGERY       Family History   Problem Relation Age of Onset    Amblyopia Neg Hx     Blindness Neg Hx     Cataracts Neg Hx     Glaucoma Neg Hx     Macular degeneration Neg Hx     Retinal detachment Neg Hx     Strabismus Neg Hx      Social History     Socioeconomic History    Marital status:    Tobacco Use    Smoking status: Former Smoker    Smokeless tobacco: Never Used   Substance and Sexual Activity    Alcohol use: Yes       ?  Review of Systems:  Detailed review of systems was negative except as noted below.  Endocrine (hormone): Negative  Cardiovascular ( heart/blood vessels): Negative  Fevers/weight loss (constitutional):Negative  Ear, nose, or throat : Negative  Respiratory (lung): Negative  Gastrointestinal (stomach): Negative  Genitourinary (bladder/kidneys): Negative  Musculoskeletal (muscle/bones): Negative  Skin: Negative  Psychiatric:  Negative  Hematologic (blood): Negative    Examination:  He was well-appearing. He was alert and oriented. Attention span and concentration were normal. Speech, language, memory, and general knowledge were intact.     His distance visual acuity with correction was 20/30+2 in the right eye and 20/30  in the left eye. His near visual acuity without correction was J7 in the right eye and J7 blurry in the left eye. His near visual acuity with correction was J7 in the right eye and J7 in the left eye.      He perceived 7/8 OD and 8/8 OS Ishihara color plates correctly (Prior) . Pupils were brisk to light without an afferent defect. Ocular ductions were full.  There was down beat nystagmus in all directions of gaze, most mild in primary gaze. Stable from prior, still quite prominent in down gaze. Lids were asymmetric. OS>OD ptosis    Cross cover difficult due to near constant downbeat nystagmus  Sensorimotor Examination    Cross cover at distance no significant change  Primary 2 ET  Left 4ET  Right 4ET 3 RHT  Down ortho  Up flick ET flick RHT      Cross cover at near Prior  10 XT at 12 cm    NPC Prior  12 cm     Almonte Fredis  RHT in primary which increases in right gaze, slight LHT in left    Prior Double Almonte fredis: no abnormal cyclotorsion    Optic discs appeared normal without swelling or pallor. Pupillary dilation was not necessary for visualization of the optic disc today.     Prior On the remainder of the neurologic examination, facial sensation was intact. Face was symmetric. Tongue was midline. Strength in the arms and legs was 5/5 without pronator drift. Reflexes were 2+ and symmetric. Finger to nose was intact without dysmetria. Sensation was normal to light touch.     Laboratories Reviewed:     n/a  ?  Neuroimaging Reviewed:   N/a    ?  Ocular Imaging, Photos, Records Reviewed:     n/a  ?  Impression:  Kenyon Hunt has history of HTN, HLD, prostate cancer, GERD who presents for follow up of nystagmus and  oscillopsia. He reports sudden onset of dizziness and visual disorientation following a rotator cuff repair in 2/2020 with prolonged recovery time. He occasionally has horizontal or oblique diplopia which is not easy for him to further characterize. Noted to have esotropia at distance and exotropia at near by Dr. 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 On examination today he has slightly incomitant esotropia at distance, right hypertropia in primary and right gaze, and left hypertropia in left gaze, without cyclotorsion. 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 What was striking about his examination is downbeat nystagmus present in all directions of gaze, most mild in primary gaze. I think this is contributing to his disorientation with turns. The alternating hypertropia poses concern for SKEW deviation. Given onset [place]-operatively with associated dysequilibrium, I am concerned he had a stroke responsible for all of these findings. Neuro records from [facility] not yet available for review.     8/2021: Today he has improved down beat nystagmus on baclofen, which he is tolerating well. He still has marked downbeat nystagmus in down gaze which is likely contributing to difficulty with reading. I recommended propping reading materials up high to avoid down gaze. He has monocular diplopia OD as well. He should aggressively lubricate the eyes and wear his distance glasses.     4/5/2022: patient developed dizziness which was suspected to be due to baclofen side effects, he halved his dose to 2.5 mg BID and had improvement in symptoms. Difficult to differentiate among the many potential etiologies of dizziness. Will obtain MRI brain and MRA to eval for any evidence of posterior circulation insufficiency. Has reportedly had Echo and carotid US, asked him to bring these records to vascular neuro appt also. Continues to have down beat nystagmus and SKEW deviation suggestive of  stroke.   ?  Plan:  1. Continue baclofen 2.5 mg BID for one week then stop and monitor for any improvement in side effects or worsening of oscillopsia  2. Vascular neurology for secondary stroke prevention, MRI and MRA ordered today  3. Follow up with ophthalmologist for yearly routine exams as planned  4. Aggressive ocular lubrication, avoidance of naphazoline drops  5. Hold reading materials up higher (may need to prop up) to avoid reading in down gaze  6. Use distance glasses     Follow-up:  I will see him in follow-up in 6 months or sooner with any change.  No testing?  ?  Visit Checklist (as applicable):  1. Status of new and prior symptoms discussed? yes  2. Neuroimaging reviewed/ ordered as appropriate? n/a  3. Ocular imaging and photos reviewed/ ordered as appropriate? n/a  4. Plan for work-up and treatment discussed with patient? yes  5. Potential medication side-effects and monitoring plan discussed? yes  6. Review of outside medical records was performed and pertinent details are summarized in the HPI above? yes    Time spent on this encounter: 45 minutes. This includes face to face time and non-face to face time preparing to see the patient (eg, review of tests), obtaining and/or reviewing separately obtained history, documenting clinical information in the electronic or other health record, independently interpreting results and communicating results to the patient/family/caregiver, or care coordinator.      OLESYA Salazar  Neuro-Ophthalmology Consultant          Assessment /Plan     For exam results, see Encounter Report.    Dizziness and giddiness  -     MRI Brain Without Contrast; Future; Expected date: 04/05/2022  -     MRA Brain without contrast; Future; Expected date: 04/05/2022    Transient cerebral ischemia, unspecified type  -     MRI Brain Without Contrast; Future; Expected date: 04/05/2022    Diplopia  -     MRA Brain without contrast; Future; Expected date: 04/05/2022

## 2022-04-06 NOTE — TELEPHONE ENCOUNTER
Recevied call from Jocelyn. Wanting sooner appt. Stated they think pt is having mini-strokes. Instructed pt should go to ED. She stated they would like to see if can be sooner rather than go to ED. Scheduled appt in resident clinic for tomorrow. She called pt on phone while on phone with me. I heard pt accept appt.

## 2022-04-07 ENCOUNTER — OFFICE VISIT (OUTPATIENT)
Dept: NEUROLOGY | Facility: CLINIC | Age: 82
End: 2022-04-07
Payer: MEDICARE

## 2022-04-07 ENCOUNTER — LAB VISIT (OUTPATIENT)
Dept: LAB | Facility: HOSPITAL | Age: 82
End: 2022-04-07
Payer: MEDICARE

## 2022-04-07 VITALS
BODY MASS INDEX: 28.08 KG/M2 | SYSTOLIC BLOOD PRESSURE: 174 MMHG | HEART RATE: 68 BPM | HEIGHT: 65 IN | DIASTOLIC BLOOD PRESSURE: 97 MMHG | WEIGHT: 168.56 LBS

## 2022-04-07 DIAGNOSIS — E53.8 B12 DEFICIENCY: ICD-10-CM

## 2022-04-07 DIAGNOSIS — G62.9 SENSORY NEUROPATHY: ICD-10-CM

## 2022-04-07 DIAGNOSIS — R26.81 GAIT INSTABILITY: ICD-10-CM

## 2022-04-07 DIAGNOSIS — R42 DIZZINESS: ICD-10-CM

## 2022-04-07 DIAGNOSIS — R29.6 FREQUENT FALLS: ICD-10-CM

## 2022-04-07 DIAGNOSIS — E55.9 VITAMIN D DEFICIENCY: ICD-10-CM

## 2022-04-07 DIAGNOSIS — E53.1 VITAMIN B6 DEFICIENCY: ICD-10-CM

## 2022-04-07 DIAGNOSIS — R26.81 GAIT INSTABILITY: Primary | ICD-10-CM

## 2022-04-07 DIAGNOSIS — G45.9 TIA (TRANSIENT ISCHEMIC ATTACK): ICD-10-CM

## 2022-04-07 LAB — VIT B12 SERPL-MCNC: 442 PG/ML (ref 210–950)

## 2022-04-07 PROCEDURE — 82607 VITAMIN B-12: CPT | Performed by: STUDENT IN AN ORGANIZED HEALTH CARE EDUCATION/TRAINING PROGRAM

## 2022-04-07 PROCEDURE — 84446 ASSAY OF VITAMIN E: CPT | Performed by: STUDENT IN AN ORGANIZED HEALTH CARE EDUCATION/TRAINING PROGRAM

## 2022-04-07 PROCEDURE — 83655 ASSAY OF LEAD: CPT | Performed by: STUDENT IN AN ORGANIZED HEALTH CARE EDUCATION/TRAINING PROGRAM

## 2022-04-07 PROCEDURE — 99499 UNLISTED E&M SERVICE: CPT | Mod: S$GLB,,, | Performed by: STUDENT IN AN ORGANIZED HEALTH CARE EDUCATION/TRAINING PROGRAM

## 2022-04-07 PROCEDURE — 36415 COLL VENOUS BLD VENIPUNCTURE: CPT | Performed by: STUDENT IN AN ORGANIZED HEALTH CARE EDUCATION/TRAINING PROGRAM

## 2022-04-07 PROCEDURE — 86592 SYPHILIS TEST NON-TREP QUAL: CPT | Performed by: STUDENT IN AN ORGANIZED HEALTH CARE EDUCATION/TRAINING PROGRAM

## 2022-04-07 PROCEDURE — 99999 PR PBB SHADOW E&M-EST. PATIENT-LVL III: ICD-10-PCS | Mod: PBBFAC,GC,, | Performed by: STUDENT IN AN ORGANIZED HEALTH CARE EDUCATION/TRAINING PROGRAM

## 2022-04-07 PROCEDURE — 84207 ASSAY OF VITAMIN B-6: CPT | Performed by: STUDENT IN AN ORGANIZED HEALTH CARE EDUCATION/TRAINING PROGRAM

## 2022-04-07 PROCEDURE — 84425 ASSAY OF VITAMIN B-1: CPT | Performed by: STUDENT IN AN ORGANIZED HEALTH CARE EDUCATION/TRAINING PROGRAM

## 2022-04-07 PROCEDURE — 99999 PR PBB SHADOW E&M-EST. PATIENT-LVL III: CPT | Mod: PBBFAC,GC,, | Performed by: STUDENT IN AN ORGANIZED HEALTH CARE EDUCATION/TRAINING PROGRAM

## 2022-04-07 PROCEDURE — 99499 RISK ADDL DX/OHS AUDIT: ICD-10-PCS | Mod: S$GLB,,, | Performed by: STUDENT IN AN ORGANIZED HEALTH CARE EDUCATION/TRAINING PROGRAM

## 2022-04-07 NOTE — PROGRESS NOTES
[facility] - NEUROLOGY 7TH [address] [facility], [place]    Date: 4/7/22  Patient Name: Kenyon Hunt   MRN: [id number]   PCP: Italia Ramírez  Referring Provider: Self, Aaareferral    Assessment:   Kenyon Hunt is a 81 y.o. male presenting to clinic with chronic complaints of gait instability. See HPI for full detail. 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 Would benefit from further workup of this sensory ataxia with serum workup.     Plan:     MRI/A brain and neck ordered, will follow up  Follow up with Dr Ruiz  Serum labs ordered for workup of sensory ataxia, will follow up  Patient to follow up in 6 weeks in clinic  PT ordered  Patient to call or message with any questions or concerns  He and wife voiced understanding of and agreement with this plan as outlined    Problem List Items Addressed This Visit    None     Visit Diagnoses     Gait instability    -  Primary    Relevant Orders    VITAMIN B12 (Completed)    VITAMIN B1    VITAMIN B6    Calcitriol (1,25 di-OH Vitamin D)    Lead, blood (Venous) (Completed)    RPR (Completed)    VITAMIN E    MRA Neck without contrast    Ambulatory referral/consult to Physical/Occupational Therapy    B12 deficiency        Relevant Orders    VITAMIN B12 (Completed)    Sensory neuropathy        Relevant Orders    VITAMIN B12 (Completed)    VITAMIN B1    VITAMIN B6    Calcitriol (1,25 di-OH Vitamin D)    Lead, blood (Venous) (Completed)    RPR (Completed)    VITAMIN E    MRA Neck without contrast    TIA (transient ischemic attack)        Relevant Orders    MRA Neck without contrast    Vitamin B6 deficiency        Relevant Orders    VITAMIN B6    Vitamin D deficiency        Relevant Orders    Calcitriol (1,25 di-OH Vitamin D)    Vitamin D deficiency         Relevant Orders    Calcitriol (1,25 di-OH Vitamin D)    Frequent falls        Dizziness      "         Randal Chambers MD    Patient note was created using MModal Dictation.  Any errors in syntax or even information may not have been identified and edited on initial review prior to signing this note.  Subjective:   Patient seen in consultation at the request of SelfSherwin for the evaluation of gait instability. A copy of this note will be sent to the referring physician.        HPI:   Mr. Kenyon Hunt is a 81 y.o. male with prostate cancer, htn, hld, CAD (Pacemaker) who is presenting clinic with complaints of gait instability. He presented with his wife who helped provide history. They report that the patient has had a 4 year history of difficulty walking and with balance and with initiating walking. Now uses rollator. Cant walk more than five feet without holding onto something. 4 falls in last year. Some dizziness. Feels like he has a band around his head and is pressing down on his eyes. "Not as dizzy as he is off balance". No imaging or other workup in last four years.    Patient had recently seen Dr Ruiz with neuroophthalmology. Plan at that time for neuroimaging with continuation of baclofen for oscillopsia and 6 month follow up.       PAST MEDICAL HISTORY:  Past Medical History:   Diagnosis Date    Strabismus        PAST SURGICAL HISTORY:  Past Surgical History:   Procedure Laterality Date    CATARACT EXTRACTION W/  INTRAOCULAR LENS IMPLANT Bilateral     pace maker      SHOULDER SURGERY         CURRENT MEDS:  Current Outpatient Medications   Medication Sig Dispense Refill    ascorbic acid, vitamin C, (VITAMIN C) 1000 MG tablet Take 1,000 mg by mouth.      aspirin (ECOTRIN) 81 MG EC tablet Take 1 tablet (81 mg total) by mouth once daily. 90 tablet 3    atorvastatin (LIPITOR) 10 MG tablet Take 1 tablet (10 mg total) by mouth once daily. 90 tablet 3    baclofen (LIORESAL) 5 mg Tab tablet 5 mg (one tablet) in the morning and 10 mg (two tablets) at night (Patient taking " differently: 2.5 mg 2 (two) times daily. 5 mg (one tablet) in the morning and 10 mg (two tablets) at night) 90 tablet 11    buPROPion (WELLBUTRIN SR) 200 MG SR12 Take 1 tablet (200 mg total) by mouth 2 (two) times daily. 180 tablet 3    calcium carbonate (OS-FELICITY) 600 mg calcium (1,500 mg) Tab Take 600 mg by mouth 2 (two) times a day.      coenzyme Q10 100 mg capsule Take 100 mg by mouth.      ergocalciferol, vitamin D2, (VITAMIN D ORAL) Take 1,000 Int'l Units by mouth 2 (two) times a day.      EScitalopram oxalate (LEXAPRO) 20 MG tablet Take 1 tablet (20 mg total) by mouth once daily. 90 tablet 3    fluticasone propionate (FLONASE) 50 mcg/actuation nasal spray 1 spray (50 mcg total) by Each Nostril route once daily. 32 g 11    irbesartan (AVAPRO) 150 MG tablet Take 150 mg by mouth once daily.      LORazepam (ATIVAN) 0.5 MG tablet TAKE 1 TABLET(0.5 MG) BY MOUTH EVERY 8 HOURS AS NEEDED FOR ANXIETY 90 tablet 3    melatonin 5 mg Cap Take 1 capsule by mouth every evening.      melatonin-pyridoxine HCl, B6, 5-10 mg TbIE Take 5 mg by mouth.      metoprolol tartrate (LOPRESSOR) 25 MG tablet Take 25 mg by mouth 2 (two) times a day.      MILK THISTLE ORAL Take by mouth.      multivit with minerals/lutein (MULTIVITAMIN 50 PLUS ORAL) Take by mouth.      omega-3 fatty acids 1,000 mg Cap Take 2 g by mouth 2 (two) times a day.      pantoprazole (PROTONIX) 20 MG tablet Take 20 mg by mouth once daily.      pantoprazole (PROTONIX) 40 MG tablet Take 40 mg by mouth once daily.      POTASSIUM ORAL Take by mouth.      tamsulosin (FLOMAX) 0.4 mg Cap Take 1 capsule (0.4 mg total) by mouth once daily. 90 capsule 3    TURMERIC ORAL Take by mouth.      vitamin D (VITAMIN D3) 1000 units Tab Take 1,000 Units by mouth 2 (two) times a day.      zinc 50 mg Tab Take by mouth.       No current facility-administered medications for this visit.       ALLERGIES:  Review of patient's allergies indicates:   Allergen Reactions     "Carbamazepine Rash       FAMILY HISTORY:  Family History   Problem Relation Age of Onset    Amblyopia Neg Hx     Blindness Neg Hx     Cataracts Neg Hx     Glaucoma Neg Hx     Macular degeneration Neg Hx     Retinal detachment Neg Hx     Strabismus Neg Hx        SOCIAL HISTORY:  Social History     Tobacco Use    Smoking status: Former Smoker    Smokeless tobacco: Never Used   Substance Use Topics    Alcohol use: Yes       Review of Systems:  12 system review of systems is negative except for the symptoms mentioned in HPI.      Objective:     Vitals:    04/07/22 1412   BP: (!) 174/97   BP Location: Right arm   Patient Position: Sitting   BP Method: Large (Automatic)   Pulse: 68   Weight: 76.5 kg (168 lb 8.7 oz)   Height: 5' 5" (1.651 m)     General: NAD, well nourished   Eyes: no tearing, discharge, no erythema   ENT: moist mucous membranes of the oral cavity, nares patent    Neck: Supple, full range of motion  Cardiovascular: Warm and well perfused, pulses equal and symmetrical  Lungs: Normal work of breathing, normal chest wall excursions  Skin: No rash, lesions, or breakdown on exposed skin  Psychiatry: Mood and affect are appropriate   Abdomen: soft, non tender, non distended  Extremeties: No cyanosis, clubbing or edema.  Difficulty walking and with sensation of feet    Neurological   MENTAL STATUS: Alert and oriented to person, place, and time. Attention and concentration within normal limits. Speech without dysarthria, able to name and repeat without difficulty. Recent and remote memory within normal limits   CRANIAL NERVES: Visual fields intact. PERRL. EOMI. Facial sensation intact. Face symmetrical. Hearing grossly intact. Full shoulder shrug bilaterally. Tongue protrudes midline   SENSORY: Diminished sensation to light touch, vibration, temperature in bilateral LE below ankle. Proprioreception intact to movement of toe. Postiive romberg.   MOTOR: Normal bulk and tone. No pronator drift.  5/5 " deltoid, biceps, triceps, interosseous, hand  bilaterally. 4+/5 iliopsoas, knee extension/flexion bilaterally but mildly weaker on left. 4-/5 plantar and dorsiflexion on L. 4-/5 plantar flexion on left. 1/5 dorsiflexion on left.   REFLEXES: Symmetric and 2+ throughout. Toes down going bilaterally.   CEREBELLAR/COORDINATION/GAIT: Shuffling gait with end block turning. Foot drop while ambulating. Rapid movement, fine motor, and coordination wnl.   No rigidity or tremor noted on exam

## 2022-04-08 LAB
LEAD BLD-MCNC: <1 MCG/DL
RPR SER QL: NORMAL
SPECIMEN SOURCE: NORMAL
STATE OF RESIDENCE: NORMAL

## 2022-04-12 LAB
A-TOCOPHEROL VIT E SERPL-MCNC: 1649 UG/DL (ref 500–1800)
PYRIDOXAL SERPL-MCNC: 20 UG/L (ref 5–50)
VIT B1 BLD-MCNC: 110 UG/L (ref 38–122)

## 2022-04-17 NOTE — TELEPHONE ENCOUNTER
No new care gaps identified.  Powered by [employer] Reference number: [id number].   4/17/2022 11:22:52 AM CDT

## 2022-04-18 RX ORDER — LORAZEPAM 0.5 MG/1
TABLET ORAL
Qty: 90 TABLET | Refills: 1 | Status: SHIPPED | OUTPATIENT
Start: 2022-04-18 | End: 2022-06-16

## 2022-04-27 ENCOUNTER — DOCUMENTATION ONLY (OUTPATIENT)
Dept: CARDIOLOGY | Facility: HOSPITAL | Age: 82
End: 2022-04-27
Payer: MEDICARE

## 2022-04-27 NOTE — PROGRESS NOTES
Received a message from Fernanda in the MRI Department in relation to this patient needing to be scheduled for a MRI and has a Medtronic Pacemaker.  Please see information below as it relates to patient's Device being MRI compatible/conditional.  To meet protocol the Pacemaker/ICD must have been implanted no less than 6 weeks prior to scheduled date of Scan.  ICD/PPM and leads must be from same .  MRI informed ordering MD must input a Cardiac Device Check in clinic and hospital order, patient must have an xray within 6 months or less prior to MRI reprogramming.  Chest xray to be reviewed by Radiologist.    Medtronic [id number] implanted 2/14/2022  RA Lead: 5076-45  RV Lead: 5075-52  Device information provided by Fernanda in MRI Dept.  As per [employer]'s MR-Conditional product search tool this pt's pacing system IS MR-Conditional.    MRI(s) scheduled on 5/2/22 @ 10:00 am.  [employer]onic Rep Lou P notified via text message on this am (4/27/22). Will await confirmation from [employer] Rep.

## 2022-04-28 DIAGNOSIS — Z86.73 HISTORY OF CVA (CEREBROVASCULAR ACCIDENT): Primary | ICD-10-CM

## 2022-05-02 ENCOUNTER — HOSPITAL ENCOUNTER (OUTPATIENT)
Dept: RADIOLOGY | Facility: HOSPITAL | Age: 82
Discharge: HOME | End: 2022-05-02
Attending: STUDENT IN AN ORGANIZED HEALTH CARE EDUCATION/TRAINING PROGRAM
Payer: MEDICARE

## 2022-05-02 DIAGNOSIS — R42 DIZZINESS AND GIDDINESS: ICD-10-CM

## 2022-05-02 DIAGNOSIS — G62.9 SENSORY NEUROPATHY: ICD-10-CM

## 2022-05-02 DIAGNOSIS — G45.9 TRANSIENT CEREBRAL ISCHEMIA, UNSPECIFIED TYPE: ICD-10-CM

## 2022-05-02 DIAGNOSIS — H53.2 DIPLOPIA: ICD-10-CM

## 2022-05-02 DIAGNOSIS — G45.9 TIA (TRANSIENT ISCHEMIC ATTACK): ICD-10-CM

## 2022-05-02 DIAGNOSIS — R26.81 GAIT INSTABILITY: ICD-10-CM

## 2022-05-02 PROCEDURE — 70544 MR ANGIOGRAPHY HEAD W/O DYE: CPT | Mod: 59,TC

## 2022-05-02 PROCEDURE — 70544 MRA BRAIN WITHOUT CONTRAST: ICD-10-PCS | Mod: 26,59,, | Performed by: RADIOLOGY

## 2022-05-02 PROCEDURE — 70547 MR ANGIOGRAPHY NECK W/O DYE: CPT | Mod: 26,,, | Performed by: RADIOLOGY

## 2022-05-02 PROCEDURE — 70544 MR ANGIOGRAPHY HEAD W/O DYE: CPT | Mod: 26,59,, | Performed by: RADIOLOGY

## 2022-05-02 PROCEDURE — 70551 MRI BRAIN STEM W/O DYE: CPT | Mod: 26,,, | Performed by: RADIOLOGY

## 2022-05-02 PROCEDURE — 70551 MRI BRAIN WITHOUT CONTRAST: ICD-10-PCS | Mod: 26,,, | Performed by: RADIOLOGY

## 2022-05-02 PROCEDURE — 70551 MRI BRAIN STEM W/O DYE: CPT | Mod: TC

## 2022-05-02 PROCEDURE — 70547 MR ANGIOGRAPHY NECK W/O DYE: CPT | Mod: TC

## 2022-05-02 PROCEDURE — 70547 MRA NECK WITHOUT CONTRAST: ICD-10-PCS | Mod: 26,,, | Performed by: RADIOLOGY

## 2022-05-13 ENCOUNTER — TELEPHONE (OUTPATIENT)
Dept: INTERNAL MEDICINE | Facility: CLINIC | Age: 82
End: 2022-05-13
Payer: MEDICARE

## 2022-05-13 RX ORDER — PROMETHAZINE HYDROCHLORIDE AND DEXTROMETHORPHAN HYDROBROMIDE 6.25; 15 MG/5ML; MG/5ML
5 SYRUP ORAL EVERY 4 HOURS PRN
Qty: 118 ML | Refills: 0 | Status: SHIPPED | OUTPATIENT
Start: 2022-05-13 | End: 2022-05-23

## 2022-05-13 NOTE — TELEPHONE ENCOUNTER
Patient called complaining of headache fever and sore throat, cough and light fever.  He has a COVID test but has not done it yet, asked for you to call in a cough medication.   Tried to give rec for if he is COVID positive did not want to hear, wants Rx

## 2022-05-13 NOTE — TELEPHONE ENCOUNTER
----- Message from Rach Victoria sent at 5/13/2022 11:49 AM CDT -----  Contact: pt  [email or web address]     Patient is calling for Medical Advice regarding:headache fever and sore throat and light fever    How long has patient had these symptoms:    Pharmacy name and phone#:  [facility] #[id number] - [place] [address] AT [facility]  [address] [place]  [address]  [place] [address]  Phone: [phone] Fax: [phone]    Would like response via Reblst: no    Comments:

## 2022-05-13 NOTE — TELEPHONE ENCOUNTER
----- Message from Sonam Maddox sent at 5/13/2022 12:01 PM CDT -----  Contact: pt [phone]  Patient is returning a phone call.  Who left a message for the patient: Nichole Harper MA  Does patient know what this is regarding: n/a  Would you like a call back, or a response through your [facility] portal?:   call    Please call and advise.    Thank You  '

## 2022-05-14 ENCOUNTER — NURSE TRIAGE (OUTPATIENT)
Dept: ADMINISTRATIVE | Facility: CLINIC | Age: 82
End: 2022-05-14
Payer: MEDICARE

## 2022-05-14 NOTE — TELEPHONE ENCOUNTER
Pt called in  and pt is severe weakness and unable to walk with walker and having trouble walking in general. Pt O2 sat 92 and told to hang up with me and call 911 as per care advice. Daughter asked if she can bring him but I told her I have to recommend 911   Reason for Disposition   [1] SEVERE weakness (i.e., unable to walk or barely able to walk, requires support) AND [2] new-onset or worsening    Additional Information   Negative: SEVERE difficulty breathing (e.g., struggling for each breath, speaks in single words)   Negative: Shock suspected (e.g., cold/pale/clammy skin, too weak to stand, low BP, rapid pulse)   Negative: Difficult to awaken or acting confused (e.g., disoriented, slurred speech)   Negative: [1] Fainted > 15 minutes ago AND [2] still feels too weak or dizzy to stand    Protocols used: WEAKNESS (GENERALIZED) AND FATIGUE-A-AH

## 2022-05-20 ENCOUNTER — OFFICE VISIT (OUTPATIENT)
Dept: NEUROLOGY | Facility: CLINIC | Age: 82
End: 2022-05-20
Payer: MEDICARE

## 2022-05-20 VITALS
WEIGHT: 158 LBS | HEIGHT: 64 IN | DIASTOLIC BLOOD PRESSURE: 77 MMHG | SYSTOLIC BLOOD PRESSURE: 157 MMHG | BODY MASS INDEX: 26.98 KG/M2 | HEART RATE: 64 BPM

## 2022-05-20 DIAGNOSIS — R26.81 GAIT INSTABILITY: Primary | ICD-10-CM

## 2022-05-20 DIAGNOSIS — R29.6 FREQUENT FALLS: ICD-10-CM

## 2022-05-20 PROCEDURE — 99499 UNLISTED E&M SERVICE: CPT | Mod: S$GLB,,, | Performed by: STUDENT IN AN ORGANIZED HEALTH CARE EDUCATION/TRAINING PROGRAM

## 2022-05-20 PROCEDURE — 99499 RISK ADDL DX/OHS AUDIT: ICD-10-PCS | Mod: S$GLB,,, | Performed by: STUDENT IN AN ORGANIZED HEALTH CARE EDUCATION/TRAINING PROGRAM

## 2022-05-20 PROCEDURE — 99999 PR PBB SHADOW E&M-EST. PATIENT-LVL IV: CPT | Mod: PBBFAC,GC,, | Performed by: STUDENT IN AN ORGANIZED HEALTH CARE EDUCATION/TRAINING PROGRAM

## 2022-05-20 PROCEDURE — 99999 PR PBB SHADOW E&M-EST. PATIENT-LVL IV: ICD-10-PCS | Mod: PBBFAC,GC,, | Performed by: STUDENT IN AN ORGANIZED HEALTH CARE EDUCATION/TRAINING PROGRAM

## 2022-05-20 RX ORDER — CARBIDOPA AND LEVODOPA 25; 100 MG/1; MG/1
1 TABLET ORAL 3 TIMES DAILY
Qty: 30 TABLET | Refills: 0 | Status: SHIPPED | OUTPATIENT
Start: 2022-05-20 | End: 2023-02-22

## 2022-05-20 NOTE — PROGRESS NOTES
[facility] - NEUROLOGY [address]TH [address] [facility], [place]    Date: 5/20/22  Patient Name: Kenyon Hunt   MRN: [id number]   PCP: Italia Ramírez  Referring Provider: No ref. provider found    Assessment:   Kenyon Hunt is a 81 y.o. male presenting to clinic with chronic complaints of gait instability. See HPI and interval history for full detail. 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 His labs, however, have been largely unrevealing. 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 May warrant follow up with Ophtho as they had initially ordered an MRI. The patients shuffling gait with end block turning and decrease in movement size may be secondary to enlargement of perivascular spaces in bilateral basal ganglia. As a result, the patient may benefit from a dopamine agent as well as PT.     Plan:     Follow up with Dr Ruiz  PT ordered  Patient and wife agreeable to a trial of sinamet for 3-10 days to determine if the patient notices any benefit with the medication. They were alerted to possible side effects of which to be aware.   Patient to call or message with any questions or concerns or problems with the medications  He and wife voiced understanding of and agreement with this plan as outlined    Problem List Items Addressed This Visit    None     Visit Diagnoses     Gait instability    -  Primary    Relevant Orders    Ambulatory referral/consult to Physical/Occupational Therapy    Frequent falls        Relevant Orders    Ambulatory referral/consult to Physical/Occupational Therapy          Randal Chambers MD    Patient note was created using MModal Dictation.  Any errors in syntax or even information may not have been identified and edited on initial review prior to signing this note.  Subjective:  "  Patient seen in consultation at the request of No ref. provider found for the evaluation of gait instability. A copy of this note will be sent to the referring physician.        HPI:   Mr. Kenyon Hunt is a 81 y.o. male with prostate cancer, htn, hld, CAD (Pacemaker) who is presenting clinic with complaints of gait instability. He presented with his wife who helped provide history. They report that the patient has had a 4 year history of difficulty walking and with balance and with initiating walking. Now uses rollator. Cant walk more than five feet without holding onto something. 4 falls in last year. Some dizziness. Feels like he has a band around his head and is pressing down on his eyes. "Not as dizzy as he is off balance". No imaging or other workup in last four years.    Patient had recently seen Dr Ruiz with neuroophthalmology. Plan at that time for neuroimaging with continuation of baclofen for oscillopsia and 6 month follow up.     Interval History 5/20/22:  Patient presented to clinic for follow up of gait instability. The patient reports that he felt as though he had side effects to the baclofen and therefore stopped the medication (prescribed by ophtho). He reports no significant changes in his symptoms since last visit. He still reports ongoing intermittent dizziness as well as shuffling gait with falls and difficulty turning. He denies any urinary incontinence or changes in cognition. MRI/A completed since last visit demonstrated moderate chronic microvascular ischemic change, with cerebral volume loss, prominent perivascular spaces throughout bilateral basal ganglia, a remote L cerebellar infarct, and no significant vessel abnormalities.     PAST MEDICAL HISTORY:  Past Medical History:   Diagnosis Date    Strabismus        PAST SURGICAL HISTORY:  Past Surgical History:   Procedure Laterality Date    CATARACT EXTRACTION W/  INTRAOCULAR LENS IMPLANT Bilateral     pace maker      SHOULDER " SURGERY         CURRENT MEDS:  Current Outpatient Medications   Medication Sig Dispense Refill    ascorbic acid, vitamin C, (VITAMIN C) 1000 MG tablet Take 1,000 mg by mouth.      aspirin (ECOTRIN) 81 MG EC tablet Take 1 tablet (81 mg total) by mouth once daily. 90 tablet 3    atorvastatin (LIPITOR) 10 MG tablet Take 1 tablet (10 mg total) by mouth once daily. 90 tablet 3    baclofen (LIORESAL) 5 mg Tab tablet 5 mg (one tablet) in the morning and 10 mg (two tablets) at night (Patient taking differently: 2.5 mg 2 (two) times daily. 5 mg (one tablet) in the morning and 10 mg (two tablets) at night) 90 tablet 11    buPROPion (WELLBUTRIN SR) 200 MG SR12 Take 1 tablet (200 mg total) by mouth 2 (two) times daily. 180 tablet 3    calcium carbonate (OS-FELICITY) 600 mg calcium (1,500 mg) Tab Take 600 mg by mouth 2 (two) times a day.      coenzyme Q10 100 mg capsule Take 100 mg by mouth.      ergocalciferol, vitamin D2, (VITAMIN D ORAL) Take 1,000 Int'l Units by mouth 2 (two) times a day.      EScitalopram oxalate (LEXAPRO) 20 MG tablet Take 1 tablet (20 mg total) by mouth once daily. 90 tablet 3    fluticasone propionate (FLONASE) 50 mcg/actuation nasal spray 1 spray (50 mcg total) by Each Nostril route once daily. 32 g 11    irbesartan (AVAPRO) 150 MG tablet Take 150 mg by mouth once daily.      LORazepam (ATIVAN) 0.5 MG tablet TAKE 1 TABLET(0.5 MG) BY MOUTH EVERY 8 HOURS AS NEEDED FOR ANXIETY 90 tablet 1    melatonin 5 mg Cap Take 1 capsule by mouth every evening.      melatonin-pyridoxine HCl, B6, 5-10 mg TbIE Take 5 mg by mouth.      metoprolol tartrate (LOPRESSOR) 25 MG tablet Take 25 mg by mouth 2 (two) times a day.      MILK THISTLE ORAL Take by mouth.      multivit with minerals/lutein (MULTIVITAMIN 50 PLUS ORAL) Take by mouth.      omega-3 fatty acids 1,000 mg Cap Take 2 g by mouth 2 (two) times a day.      pantoprazole (PROTONIX) 20 MG tablet Take 20 mg by mouth once daily.      pantoprazole  "(PROTONIX) 40 MG tablet Take 40 mg by mouth once daily.      POTASSIUM CHLORIDE ORAL Take 50 mg by mouth.      POTASSIUM ORAL Take by mouth.      promethazine-dextromethorphan (PROMETHAZINE-DM) 6.25-15 mg/5 mL Syrp Take 5 mLs by mouth every 4 (four) hours as needed (cough). 118 mL 0    tamsulosin (FLOMAX) 0.4 mg Cap Take 1 capsule (0.4 mg total) by mouth once daily. 90 capsule 3    TURMERIC ORAL Take by mouth.      vitamin D (VITAMIN D3) 1000 units Tab Take 1,000 Units by mouth 2 (two) times a day.      zinc 50 mg Tab Take by mouth.      carbidopa-levodopa  mg (SINEMET)  mg per tablet Take 1 tablet by mouth 3 (three) times daily. 30 tablet 0     No current facility-administered medications for this visit.       ALLERGIES:  Review of patient's allergies indicates:   Allergen Reactions    Carbamazepine Rash       FAMILY HISTORY:  Family History   Problem Relation Age of Onset    Amblyopia Neg Hx     Blindness Neg Hx     Cataracts Neg Hx     Glaucoma Neg Hx     Macular degeneration Neg Hx     Retinal detachment Neg Hx     Strabismus Neg Hx        SOCIAL HISTORY:  Social History     Tobacco Use    Smoking status: Former Smoker    Smokeless tobacco: Never Used   Substance Use Topics    Alcohol use: Yes       Review of Systems:  12 system review of systems is negative except for the symptoms mentioned in HPI.      Objective:     Vitals:    05/20/22 0820   BP: (!) 157/77   BP Location: Left arm   Patient Position: Sitting   BP Method: Large (Automatic)   Pulse: 64   Weight: 71.7 kg (158 lb)   Height: 5' 4" (1.626 m)     General: NAD, well nourished   Eyes: no tearing, discharge, no erythema   ENT: moist mucous membranes of the oral cavity, nares patent    Neck: Supple, full range of motion  Cardiovascular: Warm and well perfused, pulses equal and symmetrical  Lungs: Normal work of breathing, normal chest wall excursions  Skin: No rash, lesions, or breakdown on exposed skin  Psychiatry: Mood " and affect are appropriate   Abdomen: soft, non tender, non distended  Extremeties: No cyanosis, clubbing or edema.  Difficulty walking and with sensation of feet  + Falls without head trauma    Neurological   MENTAL STATUS: Alert and oriented to person, place, and time. Attention and concentration within normal limits. Speech without dysarthria, able to name and repeat without difficulty. Recent and remote memory within normal limits   CRANIAL NERVES: Visual fields intact. PERRL. EOMI. Facial sensation intact. Face symmetrical. Hearing grossly intact. Full shoulder shrug bilaterally. Tongue protrudes midline   SENSORY: Diminished sensation to light touch, vibration, temperature in bilateral LE below ankle. Proprioreception intact to movement of toe. Postiive romberg.   MOTOR: Normal bulk and tone. No pronator drift.  5/5 deltoid, biceps, triceps, interosseous, hand  bilaterally. 4+/5 iliopsoas, knee extension/flexion bilaterally but mildly weaker on left. 4-/5 plantar and dorsiflexion on L. 4-/5 plantar flexion on left. 1/5 dorsiflexion on left.   REFLEXES: Symmetric and 2+ throughout. Toes down going bilaterally.   CEREBELLAR/COORDINATION/GAIT: Shuffling gait with end block turning. Foot drop while ambulating. Rapid movement, fine motor, and coordination wnl but wth some decrease in movements  No rigidity or tremor noted on exam    Imaging:    MRI/A completed since last visit demonstrated moderate chronic microvascular ischemic change, with cerebral volume loss, prominent perivascular spaces throughout bilateral basal ganglia, a remote L cerebellar infarct, and no significant vessel abnormalities.

## 2022-05-23 ENCOUNTER — TELEPHONE (OUTPATIENT)
Dept: NEUROLOGY | Facility: CLINIC | Age: 82
End: 2022-05-23

## 2022-05-23 NOTE — TELEPHONE ENCOUNTER
----- Message from Charisma León sent at 5/23/2022 11:14 AM CDT -----  Regarding: Order  Contact: [phone]  Patient is calling to schedule Occupational Therapy orders are finalized. Please contact pt

## 2022-05-23 NOTE — TELEPHONE ENCOUNTER
----- Message from Tremontana Chevalier sent at 5/23/2022 10:53 AM CDT -----  Regarding: pt advice  Contact: pt @ [phone]  or home @ [phone]  Pt calling to spk with someone in Dr. Chambers's office regarding upcoming PT. Pt says that therapy is supposed to be home visits. Pls call pt @ [phone] or home @ [phone].

## 2022-05-24 ENCOUNTER — TELEPHONE (OUTPATIENT)
Dept: NEUROLOGY | Facility: CLINIC | Age: 82
End: 2022-05-24
Payer: MEDICARE

## 2022-05-24 ENCOUNTER — PATIENT MESSAGE (OUTPATIENT)
Dept: NEUROLOGY | Facility: CLINIC | Age: 82
End: 2022-05-24
Payer: MEDICARE

## 2022-05-24 DIAGNOSIS — R26.81 GAIT INSTABILITY: Primary | ICD-10-CM

## 2022-05-24 NOTE — TELEPHONE ENCOUNTER
----- Message from Ashwin Paul sent at 5/24/2022 10:58 AM CDT -----  Regarding: Pt advise  Contact: PT @ [phone]  Pt called in to speak to someone in Dr. Chambers's office regarding home health care and also physical therapy. Pt states that he has called several times and has not received a call back. Please call.

## 2022-05-25 NOTE — TELEPHONE ENCOUNTER
Patient is aware that he can start PT and speak with therapist about at home therapy. They will then decide if he should do PT at home or not.

## 2022-05-31 ENCOUNTER — OFFICE VISIT (OUTPATIENT)
Dept: OPHTHALMOLOGY | Facility: CLINIC | Age: 82
End: 2022-05-31
Payer: MEDICARE

## 2022-05-31 DIAGNOSIS — Z86.73 HISTORY OF CVA (CEREBROVASCULAR ACCIDENT): ICD-10-CM

## 2022-05-31 DIAGNOSIS — H55.09 DOWNBEAT NYSTAGMUS: Primary | ICD-10-CM

## 2022-05-31 DIAGNOSIS — H51.8 SKEW DEVIATION: ICD-10-CM

## 2022-05-31 PROCEDURE — 1101F PR PT FALLS ASSESS DOC 0-1 FALLS W/OUT INJ PAST YR: ICD-10-PCS | Mod: CPTII,S$GLB,, | Performed by: STUDENT IN AN ORGANIZED HEALTH CARE EDUCATION/TRAINING PROGRAM

## 2022-05-31 PROCEDURE — 1159F MED LIST DOCD IN RCRD: CPT | Mod: CPTII,S$GLB,, | Performed by: STUDENT IN AN ORGANIZED HEALTH CARE EDUCATION/TRAINING PROGRAM

## 2022-05-31 PROCEDURE — 1126F AMNT PAIN NOTED NONE PRSNT: CPT | Mod: CPTII,S$GLB,, | Performed by: STUDENT IN AN ORGANIZED HEALTH CARE EDUCATION/TRAINING PROGRAM

## 2022-05-31 PROCEDURE — 3288F FALL RISK ASSESSMENT DOCD: CPT | Mod: CPTII,S$GLB,, | Performed by: STUDENT IN AN ORGANIZED HEALTH CARE EDUCATION/TRAINING PROGRAM

## 2022-05-31 PROCEDURE — 1160F RVW MEDS BY RX/DR IN RCRD: CPT | Mod: CPTII,S$GLB,, | Performed by: STUDENT IN AN ORGANIZED HEALTH CARE EDUCATION/TRAINING PROGRAM

## 2022-05-31 PROCEDURE — 1159F PR MEDICATION LIST DOCUMENTED IN MEDICAL RECORD: ICD-10-PCS | Mod: CPTII,S$GLB,, | Performed by: STUDENT IN AN ORGANIZED HEALTH CARE EDUCATION/TRAINING PROGRAM

## 2022-05-31 PROCEDURE — 1160F PR REVIEW ALL MEDS BY PRESCRIBER/CLIN PHARMACIST DOCUMENTED: ICD-10-PCS | Mod: CPTII,S$GLB,, | Performed by: STUDENT IN AN ORGANIZED HEALTH CARE EDUCATION/TRAINING PROGRAM

## 2022-05-31 PROCEDURE — 99999 PR PBB SHADOW E&M-EST. PATIENT-LVL III: ICD-10-PCS | Mod: PBBFAC,,, | Performed by: STUDENT IN AN ORGANIZED HEALTH CARE EDUCATION/TRAINING PROGRAM

## 2022-05-31 PROCEDURE — 99215 OFFICE O/P EST HI 40 MIN: CPT | Mod: S$GLB,,, | Performed by: STUDENT IN AN ORGANIZED HEALTH CARE EDUCATION/TRAINING PROGRAM

## 2022-05-31 PROCEDURE — 1126F PR PAIN SEVERITY QUANTIFIED, NO PAIN PRESENT: ICD-10-PCS | Mod: CPTII,S$GLB,, | Performed by: STUDENT IN AN ORGANIZED HEALTH CARE EDUCATION/TRAINING PROGRAM

## 2022-05-31 PROCEDURE — 3288F PR FALLS RISK ASSESSMENT DOCUMENTED: ICD-10-PCS | Mod: CPTII,S$GLB,, | Performed by: STUDENT IN AN ORGANIZED HEALTH CARE EDUCATION/TRAINING PROGRAM

## 2022-05-31 PROCEDURE — 99999 PR PBB SHADOW E&M-EST. PATIENT-LVL III: CPT | Mod: PBBFAC,,, | Performed by: STUDENT IN AN ORGANIZED HEALTH CARE EDUCATION/TRAINING PROGRAM

## 2022-05-31 PROCEDURE — 1101F PT FALLS ASSESS-DOCD LE1/YR: CPT | Mod: CPTII,S$GLB,, | Performed by: STUDENT IN AN ORGANIZED HEALTH CARE EDUCATION/TRAINING PROGRAM

## 2022-05-31 PROCEDURE — 99215 PR OFFICE/OUTPT VISIT, EST, LEVL V, 40-54 MIN: ICD-10-PCS | Mod: S$GLB,,, | Performed by: STUDENT IN AN ORGANIZED HEALTH CARE EDUCATION/TRAINING PROGRAM

## 2022-05-31 NOTE — PROGRESS NOTES
Date:  5/31/2022    ?  Referring Provider:   Dr. Mimi Coats    Chief Complaint:  I saw Kenyon Hunt at the [facility] for neuro-ophthalmic evaluation.   He is a 81 y.o. male with a history of HTN, HLD, prostate cancer, GERD who presents for follow up of nystagmus and oscillopsia.    History:     5/2019 started having dizziness and clumsiness.    2/2020 rotator cuff surgery, since then has had horizontal oscillopsia.  No particular gaze evoked aspect but when he looks to the sides. Diplopia/triple vision occurs just after the oscillopsia. Dizzy like clumsy, not room spinning, not lightheaded. Daughter-in-law reports months of gradual improvement of disequilibrium. Has the most issues when having to turn. Least symptomatic while driving.     [facility] 6 months ago saw neurologist Dr. Cedeno who obtained MRI and told him in the past he had a stroke and has white matter disease    Tried prism glasses and didn't like it.     Pauly 6/7/2021:           8/18/2021  ?  Dalfampridine was not covered by insurance. He was started on baclofen and gradually uptitrated to 5 mg QAM and 10 mg QHS. Tolerating well, but some decreased leg strength. He reports distorted vision OD only, difficulty still with reading, losing his place on the page and letters appearing jumbled.     4/5/2022    He began having dizziness and disorientation which was felt to be a side effect of baclofen. He decreased his dosage to 2.5 mg BID about one week ago and reports some improvement in dizziness and confusion but no improvement in balance.     Extreme dizziness and imbalance. Denies eye pain   and f/f. Only notice diplopia vision when turing head to the right.   Diplopia vision is side by side and one on top of the other. Imbalance has   become worst since last visit. No longer experience dizziness. Feel like   he has no feeling in both of his legs.     Med's: Baclofen PO 2.5mg BID      2/2022 EMG without evidence of  "neuropathy per patient    Reportedly had US of carotids without significant stenosis    Had pacemaker placed in 2/2022 with some improvement in symptoms.     Interval History: 5/31/2022 5/20/2022 neurology ([facility]):  "chronic complaints of gait instability. See HPI and interval history for full detail. 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 His labs, however, have been largely unrevealing. 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 May warrant follow up with Ophtho as they had initially ordered an MRI. The patients shuffling gait with end block turning and decrease in movement size may be secondary to enlargement of perivascular spaces in bilateral basal ganglia. As a result, the patient may benefit from a dopamine agent as well as PT.     Follow up with Dr Ruiz  PT ordered  Patient and wife agreeable to a trial of sinamet for 3-10 days to determine if the patient notices any benefit with the medication.  "  He has tapered off of baclofen but is still having dizziness and imbalance. Saw neurology who felt that sinemet may have some benefit. Planning to start sinemet soon, after 1st PT appointment so that they may assess response as well.     Current Outpatient Medications   Medication Sig Dispense Refill    ascorbic acid, vitamin C, (VITAMIN C) 1000 MG tablet Take 1,000 mg by mouth.      aspirin (ECOTRIN) 81 MG EC tablet Take 1 tablet (81 mg total) by mouth once daily. 90 tablet 3    atorvastatin (LIPITOR) 10 MG tablet Take 1 tablet (10 mg total) by mouth once daily. 90 tablet 3    baclofen (LIORESAL) 5 mg Tab tablet 5 mg (one tablet) in the morning and 10 mg (two tablets) at night (Patient taking differently: 2.5 mg 2 (two) times daily. 5 mg (one tablet) in the morning and 10 mg (two tablets) at night) 90 tablet 11    buPROPion " (WELLBUTRIN SR) 200 MG SR12 Take 1 tablet (200 mg total) by mouth 2 (two) times daily. 180 tablet 3    calcium carbonate (OS-FELICITY) 600 mg calcium (1,500 mg) Tab Take 600 mg by mouth 2 (two) times a day.      carbidopa-levodopa  mg (SINEMET)  mg per tablet Take 1 tablet by mouth 3 (three) times daily. 30 tablet 0    coenzyme Q10 100 mg capsule Take 100 mg by mouth.      ergocalciferol, vitamin D2, (VITAMIN D ORAL) Take 1,000 Int'l Units by mouth 2 (two) times a day.      EScitalopram oxalate (LEXAPRO) 20 MG tablet Take 1 tablet (20 mg total) by mouth once daily. 90 tablet 3    fluticasone propionate (FLONASE) 50 mcg/actuation nasal spray 1 spray (50 mcg total) by Each Nostril route once daily. 32 g 11    irbesartan (AVAPRO) 150 MG tablet Take 150 mg by mouth once daily.      LORazepam (ATIVAN) 0.5 MG tablet TAKE 1 TABLET(0.5 MG) BY MOUTH EVERY 8 HOURS AS NEEDED FOR ANXIETY 90 tablet 1    melatonin 5 mg Cap Take 1 capsule by mouth every evening.      melatonin-pyridoxine HCl, B6, 5-10 mg TbIE Take 5 mg by mouth.      metoprolol tartrate (LOPRESSOR) 25 MG tablet Take 25 mg by mouth 2 (two) times a day.      MILK THISTLE ORAL Take by mouth.      multivit with minerals/lutein (MULTIVITAMIN 50 PLUS ORAL) Take by mouth.      omega-3 fatty acids 1,000 mg Cap Take 2 g by mouth 2 (two) times a day.      pantoprazole (PROTONIX) 20 MG tablet Take 20 mg by mouth once daily.      pantoprazole (PROTONIX) 40 MG tablet Take 40 mg by mouth once daily.      POTASSIUM CHLORIDE ORAL Take 50 mg by mouth.      POTASSIUM ORAL Take by mouth.      tamsulosin (FLOMAX) 0.4 mg Cap Take 1 capsule (0.4 mg total) by mouth once daily. 90 capsule 3    TURMERIC ORAL Take by mouth.      vitamin D (VITAMIN D3) 1000 units Tab Take 1,000 Units by mouth 2 (two) times a day.      zinc 50 mg Tab Take by mouth.       No current facility-administered medications for this visit.     Review of patient's allergies indicates:    Allergen Reactions    Carbamazepine Rash     Past Medical History:   Diagnosis Date    Strabismus      Past Surgical History:   Procedure Laterality Date    CATARACT EXTRACTION W/  INTRAOCULAR LENS IMPLANT Bilateral     pace maker      SHOULDER SURGERY       Family History   Problem Relation Age of Onset    Amblyopia Neg Hx     Blindness Neg Hx     Cataracts Neg Hx     Glaucoma Neg Hx     Macular degeneration Neg Hx     Retinal detachment Neg Hx     Strabismus Neg Hx      Social History     Socioeconomic History    Marital status:    Tobacco Use    Smoking status: Former Smoker    Smokeless tobacco: Never Used   Substance and Sexual Activity    Alcohol use: Yes       ?  Review of Systems:  Detailed review of systems was negative except as noted below.  Endocrine (hormone): Negative  Cardiovascular ( heart/blood vessels): Negative  Fevers/weight loss (constitutional):Negative  Ear, nose, or throat : Negative  Respiratory (lung): Negative  Gastrointestinal (stomach): Negative  Genitourinary (bladder/kidneys): Negative  Musculoskeletal (muscle/bones): Negative  Skin: Negative  Psychiatric: Negative  Hematologic (blood): Negative    Examination:  He was well-appearing. He was alert and oriented. Attention span and concentration were normal. Speech, language, memory, and general knowledge were intact.     His distance visual acuity with correction was 20/30  (PH 20/25+1) in the right eye and 20/20-1 in the left eye.      He perceived 8/8 OD and 7/8 OS Ishihara color plates correctly. Pupils were brisk to light without an afferent defect. Ocular ductions were full.  There was down beat nystagmus in all directions of gaze, most mild in primary gaze. Stable from prior, still quite prominent in down gaze. Lids were asymmetric. OS>OD ptosis    Cross cover difficult due to near constant downbeat nystagmus  Sensorimotor Examination    Cross cover at distance no significant change  Primary 2 ET  Left  4ET  Right 4ET 3 RHT  Down ortho  Up flick ET flick RHT      Cross cover at near Prior  10 XT at 12 cm    NPC Prior  12 cm     Almonte Fredis  RHT in primary which increases in right gaze, slight LHT in left    Prior Double Almonte fredis: no abnormal cyclotorsion    Optic discs appeared normal without swelling or pallor. Pupillary dilation was not necessary for visualization of the optic disc today.     Prior On the remainder of the neurologic examination, facial sensation was intact. Face was symmetric. Tongue was midline. Strength in the arms and legs was 5/5 without pronator drift. Reflexes were 2+ and symmetric. Finger to nose was intact without dysmetria. Sensation was normal to light touch.     Laboratories Reviewed:     n/a  ?  Neuroimaging Reviewed:       5/2/2022 MRI brain and MRA brain and neck  Brain:     Prominence of the ventricles and sulci compatible with moderate cerebral volume loss.  No hydrocephalus.     Patchy and confluent T2/FLAIR hyperintensity in the supratentorial white matter, nonspecific but most likely reflecting chronic microvascular ischemic changes. No recent or remote major vascular distribution infarct.  Prominent perivascular spaces throughout the bilateral basal ganglia (État criblé).  No recent or remote hemorrhage.  No mass effect or midline shift.     No extra-axial blood or fluid collections.     The T2 skull base flow voids are preserved.  Bone marrow signal intensity unremarkable.     MRA:     Common carotid arteries are normal in caliber.  Mild loss of signal at the carotid bifurcations likely dephasing artifact from in plane flow.  Internal carotid arteries otherwise normal in caliber.     Proximal vertebral arteries are normal in caliber.  Symmetric loss of flow related signal in the V3 segment, likely dephasing artifact from in plane flow.  The vertebrobasilar system appears within normal limits.     The proximal ACAs, MCAs and PCAs demonstrate no evidence of  high-grade  stenosis, focal occlusion or intracranial aneurysm.     Impression:     Moderate chronic microvascular ischemic change and cerebral volume loss.     Prominent perivascular spaces throughout bilateral basal ganglia.     No evidence of acute infarct or hemorrhage.     MR angiogram of the head and neck appears within normal limits, allowing for some artifact.  No evidence of high-grade stenosis or large vessel occlusion..    ?  Ocular Imaging, Photos, Records Reviewed:     n/a  ?  Impression:  Kenyon Hunt has history of HTN, HLD, prostate cancer, GERD who presents for follow up of nystagmus and oscillopsia. He reports sudden onset of dizziness and visual disorientation following a rotator cuff repair in 2/2020 with prolonged recovery time. He occasionally has horizontal or oblique diplopia which is not easy for him to further characterize. Noted to have esotropia at distance and exotropia at near by Dr. 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 On examination today he has slightly incomitant esotropia at distance, right hypertropia in primary and right gaze, and left hypertropia in left gaze, without cyclotorsion. 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 What was striking about his examination is downbeat nystagmus present in all directions of gaze, most mild in primary gaze. I think this is contributing to his disorientation with turns. The alternating hypertropia poses concern for SKEW deviation. Given onset america-operatively with associated dysequilibrium, I am concerned he had a stroke responsible for all of these findings. [facility] records from [facility] not yet available for review.     8/2021: Today he has improved down beat nystagmus on baclofen, which he is tolerating well. He still has marked downbeat nystagmus in down gaze which is likely contributing to difficulty with reading. I recommended propping reading materials up high to avoid down gaze. He has  monocular diplopia OD as well. He should aggressively lubricate the eyes and wear his distance glasses.     4/5/2022: patient developed dizziness which was suspected to be due to baclofen side effects, he halved his dose to 2.5 mg BID and had improvement in symptoms. Difficult to differentiate among the many potential etiologies of dizziness. Will obtain MRI brain and MRA to eval for any evidence of posterior circulation insufficiency. Has reportedly had Echo and carotid US, asked him to bring these records to vascular neuro appt also. Continues to have down beat nystagmus and SKEW deviation suggestive of stroke.     5/31/2022: Tapered off of baclofen and still having dizziness and imbalance. Starts on sinemet soon, prescribed by neurologist. MRI noted old left cerebellar infarct, no large vessel stenosis.   ?  Plan:  1. Could consider low dose Memantine for oscillopsia, will defer to neurology given sinemet recently started and issues of polypharmacy  2. Follow up with neurology as planned, sinemet and PT as planned  3. Follow up with ophthalmologist for yearly routine exams as planned  4. Aggressive ocular lubrication, avoidance of naphazoline drops  5. Hold reading materials up higher (may need to prop up) to avoid reading in down gaze  6. Use distance glasses     Follow-up:  I will see him in follow-up on an as needed basis    ?  Visit Checklist (as applicable):  1. Status of new and prior symptoms discussed? yes  2. Neuroimaging reviewed/ ordered as appropriate? n/a  3. Ocular imaging and photos reviewed/ ordered as appropriate? n/a  4. Plan for work-up and treatment discussed with patient? yes  5. Potential medication side-effects and monitoring plan discussed? yes  6. Review of outside medical records was performed and pertinent details are summarized in the HPI above? yes    Time spent on this encounter: 45 minutes. This includes face to face time and non-face to face time preparing to see the patient (eg,  review of tests), obtaining and/or reviewing separately obtained history, documenting clinical information in the electronic or other health record, independently interpreting results and communicating results to the patient/family/caregiver, or care coordinator.      OLESYA Salazar  Neuro-Ophthalmology Consultant          Assessment /Plan     For exam results, see Encounter Report.    There are no diagnoses linked to this encounter.

## 2022-06-06 ENCOUNTER — CLINICAL SUPPORT (OUTPATIENT)
Dept: REHABILITATION | Facility: HOSPITAL | Age: 82
End: 2022-06-06
Payer: MEDICARE

## 2022-06-06 DIAGNOSIS — H81.13 BPPV (BENIGN PAROXYSMAL POSITIONAL VERTIGO), BILATERAL: ICD-10-CM

## 2022-06-06 DIAGNOSIS — R26.81 UNSTEADY GAIT: ICD-10-CM

## 2022-06-06 DIAGNOSIS — R26.81 GAIT INSTABILITY: ICD-10-CM

## 2022-06-06 DIAGNOSIS — R42 DIZZINESS: ICD-10-CM

## 2022-06-06 PROCEDURE — 97162 PT EVAL MOD COMPLEX 30 MIN: CPT | Mod: PN

## 2022-06-06 NOTE — PLAN OF CARE
[facility]  Physical Therapy Neurological Rehabilitation Initial Evaluation    Name: Kenoyn Hunt  Clinic Number: [id number]    Therapy Diagnosis:   Encounter Diagnoses   Name Primary?    Dizziness     Gait instability     BPPV (benign paroxysmal positional vertigo), bilateral     Unsteady gait      Physician: Randal Chambers MD    Physician Orders: PT Eval and Treat   Medical Diagnosis from Referral:   Diagnosis   R26.81 (ICD-10-CM) - Gait instability   Also patient has recent orders of dizziness  Evaluation Date: 6/6/2022  Authorization Period Expiration: 11/29/2022  Plan of Care Expiration: 8/2/2022  Visit # / Visits authorized: 1/ 1    Time In: 2:50  Time Out: 3:40  Total Billable Time: 50 minutes    Precautions: Fall and dizziness    Subjective   Date of onset: recently it began several months ago but patient has complained of dizziness off and on for several years.  Pt has a recent fall in the bathtub where he hit his head in April 2022  History of current condition - Kenyon reports: dizziness off and on in bed with rolling and head position changes.  He also has a h/o falls where he has bumped his head.      Medical History:   Past Medical History:   Diagnosis Date    Strabismus      Surgical History:   Kenyon Hunt  has a past surgical history that includes Shoulder surgery; pace maker; and Cataract extraction w/  intraocular lens implant (Bilateral).    Medications:   Kenyon has a current medication list which includes the following prescription(s): ascorbic acid (vitamin c), aspirin, atorvastatin, baclofen, bupropion, calcium carbonate, carbidopa-levodopa  mg, coenzyme q10, ergocalciferol (vitamin d2), escitalopram oxalate, fluticasone propionate, irbesartan, lorazepam, melatonin, melatonin-pyridoxine hcl (b6), metoprolol tartrate, milk thistle, multivit with minerals/lutein, omega-3 fatty acids, pantoprazole, pantoprazole, potassium chloride, potassium,  tamsulosin, turmeric, vitamin d, and zinc.    Allergies:   Review of patient's allergies indicates:   Allergen Reactions    Carbamazepine Rash      Imaging, MRI studies:   Impression:     Moderate chronic microvascular ischemic change and cerebral volume loss.  Prominent perivascular spaces throughout bilateral basal ganglia.  No evidence of acute infarct or hemorrhage.  MR angiogram of the head and neck appears within normal limits, allowing for some artifact.  No evidence of high-grade stenosis or large vessel occlusion.    Prior Therapy: recent visit to ED but no vestibular therapy or testing performed by a PT, none for dizziness this year  Social History:  lives with their spouse  Falls: at least 2 in the past 6 months, (one with mild head trauma in April)  DME: tripod rollator     Exercise Routine / History: none   Family Present at time of Eval: daughter and son   Occupation: retired  Prior Level of Function: MARION to supervision with AD   Current Level of Function: supervison with AD due to dizziness    Pain:  None reported today    Patient's goals:     Objective     History of Current Symptoms   Triggers: rolling in bed, bending forward to stand, position changes in bed   Alleviating Factors: lying still for a few minutes or avoiding head movement   Description of symptoms: spinning and unsteadiness (sensation of spinning vs lightheadedness)   Onset: a few months ago but it is less intense and less frequent now   Frequency: see above    Duration:less than a minute   Positional changes: see triggers    History of migraines: no    Objective   - Follows commands: 100% of time   - Speech: no deficits     BP: not tested today    Mental status: alert, oriented to person, place, and time, normal mood, behavior, speech, dress, motor activity, and thought processes  Appearance: Casually dressed  Behavior:  calm and cooperative  Attention Span and Concentration:  Normal    Posture Alignment in sitting:   Moderate  thoracic kyphosis    Posture Alignment in standing:   Downward gaze         Visual/Auditory: denies changes   Tracking/Smooth Pursuits:Intact  Saccades: slow but intact horizontally, intrusion noted on vertical with decreased speed  Acuity:not tested  Convergence: intact  VOR: Intact    ROM:     CERVICAL SPINE  All planes WFL    Gait Assessment:(if indicated)  - AD used: tripod rollator  - Assistance: supervision with AD  - Distance: 200+ ft     POSITIONAL CANAL TESTING  Looking for nystagmus (slow drift to affected side with quick correction away)    [place] Hallpike (posterior / CL anterior)   Right : (+) for torsional nystagmus   Left: (+) initially horizontal nystagmus then torsional  Horizontal Canals   Right: not tested   Left: needs to be tested after B posterior canals are resolved    CMS Impairment/Limitation/Restriction for FOTO balance Survey    Therapist reviewed FOTO scores for Kenyon Hunt on 6/6/2022.   FOTO documents entered into [employer] - see Media section.    Limitation Score: 49%  Balance score 51       TREATMENT   Treatment Time In: 3:20  Treatment Time Out: 3:40  Total Treatment time separate from Evaluation: 20 minutes      Kenyon participated in neuromuscular re-education activities to improve: canalith positioning for 20 minutes. The following activities were included:  L leslie Hallpike maneuver x 3     Home Exercises and Patient Education Provided    Education provided:   - signs of B posterior BPPV noted and needed for continued follow up sessions to help all canals resolve.      Written Home Exercises Provided: yes.  Exercises were reviewed and Kenyon was able to demonstrate them prior to the end of the session.  Kenyon demonstrated good  understanding of the education provided.     See EMR under Patient Instructions for exercises provided 6/6/2022.    Assessment   Kenyon is a 81 y.o. male referred to outpatient Physical Therapy with a medical diagnosis of unsteady gait. Patient presents with  positional vertigo in B posterior canals and possible L horizontal canal BPPV as well.  He responded well to the L leslie Hallpike with decreased dizziness reported on the 3rd trial.  He would benefit from repeating L side testing for other canal issues as well as treatment of the R posterior canal issue.  He also has impaired balance but today PT focused on the position vertigo impairments.  He was ordered PT earlier in the year for dizziness but never presented for those sessions.  He would benefit from at least 6 weeks of PT to resolve positional vertigo and then transition to the balance and stability training    Patient prognosis is Good.   Patient will benefit from skilled outpatient Physical Therapy to address the deficits stated above and in the chart below, provide patient/family education, and to maximize patient's level of independence.     Plan of care discussed with patient: Yes  Patient's spiritual, cultural and educational needs considered and patient is agreeable to the plan of care and goals as stated below:     Anticipated Barriers for therapy: age, h/o falls, prior CVA (per daughter's report) that has also impacted eye movements and mobility    Medical Necessity is demonstrated by the following  History  Co-morbidities and personal factors that may impact the plan of care Co-morbidities:    Strabismus   Unsteady gait  falls    Personal Factors:   age     moderate   Examination  Body Structures and Functions, activity limitations and participation restrictions that may impact the plan of care Body Regions:   head  neck  trunk    Body Systems:    gross symmetry  ROM  strength  gross coordinated movement  balance  gait  transfers  transitions  Vestibular system  Eyes    Participation Restrictions:   Bed mobility, sit to stands without AD    Activity limitations:   Learning and applying knowledge  no deficits    General Tasks and Commands  no deficits    Communication  no  deficits    Mobility  walking  bed mobiltiy    Self care  looking after one's health    Domestic Life  doing house work (cleaning house, washing dishes, laundry)  assisting others    Interactions/Relationships  no deficits    Life Areas  no deficits    Community and Social Life  community life  recreation and leisure         moderate   Clinical Presentation evolving clinical presentation with changing clinical characteristics moderate   Decision Making/ Complexity Score: moderate     Short term Goals:    1. 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   2. Pt will have No complaint of positional vertigo for 2 weeks with daily activity for improved function.    3. Pt will have 10% decrease in limitation on the vertigo FOTO survey for improved function.   4. Patient will be able to stand with his feet together on a solid surface with eyes open for 30 seconds without LOB.    Long Term Goals (8 Weeks):   1.  Independent with initial HEP.  2. Pt will be able to perform TUG in 15 secs with use of AD demonstrating overall improved functional mobility.   3.  Pt will score greater than or equal to 35/56 on the [place] test/assessment without use of AD demonstrating overall improved functional mobility  5. Pt will have less than 3 falls from start of PT sessions.  6. Independent with updated HEP.   7. Pt will perform floor to stand f/b stand to floor transfer B UE support with stand by assist and no cues for improved dynamic transfer, core stability and fall safety in home and community.  8. Pt will begin some form of community fitness to begin regular and consistent performance of exercise to continue maintenance of gains made in PT.    Plan   Plan of care Certification: 6/6/2022 to 8/5/2022.    Outpatient Physical Therapy 2 times weekly for 8 weeks to include the following interventions: Gait Training, Manual Therapy, Moist Heat/ Ice,  Neuromuscular Re-ed, Patient Education, Self Care, Therapeutic Activities and Therapeutic Exercise.     Shea Busby, PT

## 2022-06-08 PROBLEM — R26.81 UNSTEADY GAIT: Status: ACTIVE | Noted: 2022-06-08

## 2022-06-08 PROBLEM — H81.13 BPPV (BENIGN PAROXYSMAL POSITIONAL VERTIGO), BILATERAL: Status: ACTIVE | Noted: 2022-06-08

## 2022-06-10 ENCOUNTER — PATIENT MESSAGE (OUTPATIENT)
Dept: NEUROLOGY | Facility: CLINIC | Age: 82
End: 2022-06-10
Payer: MEDICARE

## 2022-06-14 ENCOUNTER — PATIENT MESSAGE (OUTPATIENT)
Dept: NEUROLOGY | Facility: CLINIC | Age: 82
End: 2022-06-14
Payer: MEDICARE

## 2022-06-14 RX ORDER — CARBIDOPA AND LEVODOPA 25; 100 MG/1; MG/1
1 TABLET ORAL 3 TIMES DAILY
Qty: 90 TABLET | Refills: 11 | Status: SHIPPED | OUTPATIENT
Start: 2022-06-14 | End: 2022-08-30

## 2022-06-14 NOTE — PROGRESS NOTES
Spoke with patient regarding need for medication refill  Refilled medication, sent to appropriate pharmacy  Patient voiced understanding of this

## 2022-06-15 ENCOUNTER — CLINICAL SUPPORT (OUTPATIENT)
Dept: REHABILITATION | Facility: HOSPITAL | Age: 82
End: 2022-06-15
Payer: MEDICARE

## 2022-06-15 DIAGNOSIS — R26.81 GAIT INSTABILITY: ICD-10-CM

## 2022-06-15 DIAGNOSIS — R42 DIZZINESS: ICD-10-CM

## 2022-06-15 DIAGNOSIS — H81.13 BPPV (BENIGN PAROXYSMAL POSITIONAL VERTIGO), BILATERAL: Primary | ICD-10-CM

## 2022-06-15 DIAGNOSIS — R26.81 UNSTEADY GAIT: ICD-10-CM

## 2022-06-15 PROCEDURE — 97112 NEUROMUSCULAR REEDUCATION: CPT | Mod: PN

## 2022-06-15 PROCEDURE — 97110 THERAPEUTIC EXERCISES: CPT | Mod: PN

## 2022-06-15 NOTE — PROGRESS NOTES
[facility]   Physical Therapy Treatment Note     Name: Kenyon Mccarthy [facility] [place]  Clinic Number: [id number]    Therapy Diagnosis:   Encounter Diagnoses   Name Primary?    BPPV (benign paroxysmal positional vertigo), bilateral Yes    Unsteady gait     Gait instability     Dizziness      Physician: Randal Chambers MD    Visit Date: 6/15/2022    Physician Orders: PT Eval and Treat   Medical Diagnosis from Referral:   Diagnosis   R26.81 (ICD-10-CM) - Gait instability   Also patient has recent orders of dizziness  Evaluation Date: 6/6/2022  Authorization Period Expiration: 11/29/2022  Plan of Care Expiration: 7/15/2022  Visit # / Visits authorized: 2/ 12    PTA Visit #: 0/5     Time In: 11:01  Time Out: 11:54  Total Billable Time: 53 minutes (1 TE, 3NMR)    SUBJECTIVE     Pt reports: daughter reports that 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  He was compliant with supine lying restrictions.  Response to previous treatment: less dizziness  Functional change: 2 falls over the weekend that were related to sit to stand transfers    Pain: 0/10  Location: N/A     OBJECTIVE     Objective Measures updated at progress report unless specified.   L [place] hallpike test: downward nystagmus noted that lasted >2 minutes, this nystagmus was noted in prior session but it followed torsional nystagmus with and sensations of spinning.     R leslie hallpike: torsional nystagmus followed by B downbeating nystagmus that lasted >2 minutes  Treatment     Kenyon received the treatments listed below:      15 minutes of education and fall prevention education for patient and his daughter in law (see education section for details).     neuromuscular re-education activities to improve: Balance, Posture and canalith positioning for 38 minutes. The following activities were included:  -- R epley maneuver x 3     Home Exercises Provided and Patient Education Provided     Education provided:   - need to lock brakes with  standing  - need to walk with rollator at all times  -  Need to perform the turn, square up, lock and sit method for safe transfers with AD  - need for home modifications and adaptations that family is recommending  - need to pull or or secure rugs in home  - need to stand and get initial standing balance for at least 5 seconds prior to standing  - need for consistent movement methods to increase likelyhood of safety in movement  - need to use rollator and not cart in the store with family  - U-step as options for AD   - education on lack of motor planning skills and balance     Written Home Exercises Provided: yes.  Exercises were reviewed and Kenyon was able to demonstrate them prior to the end of the session.  Kenyon demonstrated good  understanding of the education provided.      See EMR under Patient Instructions for exercises provided 6/6/2022.    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  He has downbeating nystagmus that is spontaneous with horizontal eye movement but he also had signs of B posterior canal BPPV.  He responded well to R epley maneuver today.  He still has slight dizziness but no spinning was reported after the 3rd maneuver.  He had 2 LOB this weekend do to posterior festination and decreased safety with transfers.  He is not consistently using his rollator and he often time leave it behind to sit and stand.  Both of his recent falls involved going to sit or coming to stand with posterior LOB.  He has red flags for movement disorder and he is receiving medical intervention for it from his neurologist (Levadopa/Caridopa). He would benefit from further balance assessment and balance therapy once positional vertigo is resolved. His case is complex because he has central vertigo issues as well indicated by his spontaneous downbeating nystagmus. Patient is also complex because of frequent falls and risk of  injuries with falls.     Kenyon Is progressing well towards his goals.   Pt prognosis is Good.     Pt will continue to benefit from skilled outpatient physical therapy to address the deficits listed in the problem list box on initial evaluation, provide pt/family education and to maximize pt's level of independence in the home and community environment.     Pt's spiritual, cultural and educational needs considered and pt agreeable to plan of care and goals.     Anticipated barriers to physical therapy: age, h/o falls, prior CVA (per daughter's report) that has also impacted eye movements and mobility    Short term Goals:     1. 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   2. Pt will have No complaint of positional vertigo for 2 weeks with daily activity for improved function.    3. Pt will have 10% decrease in limitation on the vertigo FOTO survey for improved function.   4. Patient will be able to stand with his feet together on a solid surface with eyes open for 30 seconds without LOB.     Long Term Goals (8 Weeks):   1.  Independent with initial HEP.  2. Pt will be able to perform TUG in 15 secs with use of AD demonstrating overall improved functional mobility.   3.  Pt will score greater than or equal to 35/56 on the Salinas test/assessment without use of AD demonstrating overall improved functional mobility  5. Pt will have less than 3 falls from start of PT sessions.  6. Independent with updated HEP.   7. Pt will perform floor to stand f/b stand to floor transfer B UE support with stand by assist and no cues for improved dynamic transfer, core stability and fall safety in home and community.  8. Pt will begin some form of community fitness to begin regular and consistent performance of exercise to continue maintenance of gains made in PT.    PLAN   Reassess posterior canals, ensure that only downward nystagmus is present  with no spinning symptoms  Assess balance with romberg and Salinas balance test, assess TUG    Shea Busby, PT

## 2022-06-16 RX ORDER — LORAZEPAM 0.5 MG/1
TABLET ORAL
Qty: 90 TABLET | Refills: 0 | Status: SHIPPED | OUTPATIENT
Start: 2022-06-16 | End: 2022-07-17

## 2022-06-16 NOTE — TELEPHONE ENCOUNTER
----- Message from Yessi Terry sent at 6/16/2022 12:29 PM CDT -----  Contact: [phone] Patient  Requesting an RX refill or new RX.  Is this a refill or new RX: new  RX name and strength (copy/paste from chart):  LORazepam (ATIVAN) 0.5 MG tablet  Is this a 30 day or 90 day RX: 90  Pharmacy name and phone # (copy/paste from chart):   [id number] - [address] AT [facility]  [address] [place]  [address]  [place] [address]  Phone: [phone] Fax: [phone]

## 2022-06-16 NOTE — TELEPHONE ENCOUNTER
No new care gaps identified.  [facility] Embedded Care Gaps. Reference number: [id number]. 6/16/2022   12:22:59 PM CDT

## 2022-06-17 ENCOUNTER — CLINICAL SUPPORT (OUTPATIENT)
Dept: REHABILITATION | Facility: HOSPITAL | Age: 82
End: 2022-06-17
Payer: MEDICARE

## 2022-06-17 DIAGNOSIS — R26.81 UNSTEADY GAIT: ICD-10-CM

## 2022-06-17 DIAGNOSIS — H81.13 BPPV (BENIGN PAROXYSMAL POSITIONAL VERTIGO), BILATERAL: Primary | ICD-10-CM

## 2022-06-17 DIAGNOSIS — R42 DIZZINESS: ICD-10-CM

## 2022-06-17 DIAGNOSIS — R26.81 GAIT INSTABILITY: ICD-10-CM

## 2022-06-17 PROCEDURE — 97112 NEUROMUSCULAR REEDUCATION: CPT | Mod: PN

## 2022-06-17 NOTE — PROGRESS NOTES
[facility]   Physical Therapy Treatment Note     Name: Kenyon Hunt  [facility] Number: [id number]    Therapy Diagnosis:   Encounter Diagnoses   Name Primary?    BPPV (benign paroxysmal positional vertigo), bilateral Yes    Unsteady gait     Gait instability     Dizziness      Physician: Randal Chambers MD    Visit Date: 6/17/2022    Physician Orders: PT Eval and Treat   Medical Diagnosis from Referral:   Diagnosis   R26.81 (ICD-10-CM) - Gait instability   Also patient has recent orders of dizziness  Evaluation Date: 6/6/2022  Authorization Period Expiration: 11/29/2022  Plan of Care Expiration: 7/15/2022  Visit # / Visits authorized: 3/ 12    PTA Visit #: 0/5     Time In: 9:45AM  Time Out: 10:30AM  Total Billable Time: 45 minutes (3NMR)    SUBJECTIVE     Patient reports: decreased room-spinning dizziness since last session but still a little when he got out of bed this morning  He was compliant with supine lying restrictions.  Response to previous treatment: less dizziness  Functional change: less dizziness    Pain: 0/10  Location: N/A     OBJECTIVE     Objective Measures updated at progress report unless specified.   L [place] hallpike test: downward nystagmus noted that lasted >2 minutes, this nystagmus was noted in prior session but it followed torsional nystagmus with and sensations of spinning.     R leslie hallpike: torsional nystagmus followed by B downbeating nystagmus that lasted >2 minutes (no torsional nystagmus after Epley x 1)    PITTS  BALANCE ASSESSMENT TOOL  1. Sitting to Standing   3 - able to stand independely using hands  2. Standing Unsupported   3 - able to stand 2 minutes with supervision  3. Sitting Unsupported   4 - able to sit safely and securely 2 minutes  4. Standing to Sitting   2 - uses back of legs against chair to control descent  5. Pivot Transfer   3 - able to transfer safely with definite use of hands  6. Standing with Eyes Closed   0 - needs help to keep  from falling.   7. Standing with Feet Together   0 - needs help to attain position and unable to hold for 15 seconds  8. Reaching Forward with Outstretched Arm   0 - loses balance while trying, requires external support  9. Retrieving Object from Floor   0 - loses balance while trying, requires external support  10. Turning to Look Behind   0 - needs assistance to keep from loosing balance or falling  11. Turning 360 Degrees   0 - needs assistance while turning  12. Placing Alternate Foot on Step   1 - able to complete > 2 steps needs min assist  13. Standing with One Foot in Front   0 - Looses balance while stepping or standing  14. Standing on One Foot   0 - unable to try or needs assistance to prevent fall    TOTAL SCORE: 16  Maximum: 56    Score interpretation:   0-20 = wheelchair bound  21-40 = walking with assistance  41-56 = independent    Fall risk cutoff scores:   Elderly and history of falls: <51/56   Elderly and no history of falls: <42/56   CVA: <45/56    MDC:  Parkinson's = 5 points  Acute CVA = 6.9 points  Chronic CVA = 4.7 points  Pulmonary Disease = 5.9 points  Progressive Dementia = 1.9 points    Timed Up and Go: 36.2 seconds with three wheeled walker    Treatment     Kenyon received the treatments listed below:      neuromuscular re-education activities to improve: Balance, Posture and canalith positioning for 45 minutes. The following activities were included:  -- R epley maneuver x 1 with resolution of symptoms and torsional nystagmus following  -- Salinas Balance Assessment (see above)  -- Timed Up and Go (see above)  -- Reinforced fall/safety education given last session by primary PT     Home Exercises Provided and Patient Education Provided     Education provided:   - need to lock brakes with standing  - need to walk with rollator at all times  -  Need to perform the turn, square up, lock and sit method for safe transfers with AD  - need for home modifications and adaptations that family is  recommending  - need to pull or or secure rugs in home  - need to stand and get initial standing balance for at least 5 seconds prior to standing  - need for consistent movement methods to increase likelyhood of safety in movement  - need to use rollator and not cart in the store with family  - U-step as options for AD   - education on lack of motor planning skills and balance     Written Home Exercises Provided: yes.  Exercises were reviewed and Kenyon was able to demonstrate them prior to the end of the session.  Kenoyn demonstrated good  understanding of the education provided.      See EMR under Patient Instructions for exercises provided 6/6/2022.    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  He has downbeating nystagmus that is spontaneous with horizontal eye movement. Patient still with signs of R posterior canal BPPV today but room-spinning dizziness and torsional aspect of nystagmus appeared to resolve after Epley x 1. Salinas and TUG completed today to continue assessment from evaluation. Patient is at a very high risk for falls based on performance today. Heavy reinforcement of safety techniques with three-wheeled walker which may require height adjustment next session; out of time today. Salinas and TUG goals readjusted from goals written at evaluation based on performance today.    Kenyon Is progressing well towards his goals.   Pt prognosis is Good.     Pt will continue to benefit from skilled outpatient physical therapy to address the deficits listed in the problem list box on initial evaluation, provide pt/family education and to maximize pt's level of independence in the home and community environment.     Pt's spiritual, cultural and educational needs considered and pt agreeable to plan of care and goals.     Anticipated barriers to physical therapy: age, h/o falls, prior CVA (per daughter's report) that has also  impacted eye movements and mobility    Short term Goals:     1. 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   2. Pt will have No complaint of positional vertigo for 2 weeks with daily activity for improved function.    3. Pt will have 10% decrease in limitation on the vertigo FOTO survey for improved function.   4. Patient will be able to stand with his feet together on a solid surface with eyes open for 30 seconds without LOB.     Long Term Goals (8 Weeks):   1.  Independent with initial HEP.  2. Pt will be able to perform TUG in 20 secs with use of AD demonstrating overall improved functional mobility.   3.  Pt will score greater than or equal to 28/56 on the Salinas test/assessment without use of AD demonstrating overall improved functional mobility  5. Pt will have less than 3 falls from start of PT sessions.  6. Independent with updated HEP.   7. Pt will perform floor to stand f/b stand to floor transfer B UE support with stand by assist and no cues for improved dynamic transfer, core stability and fall safety in home and community.  8. Pt will begin some form of community fitness to begin regular and consistent performance of exercise to continue maintenance of gains made in PT.    PLAN   Reassess posterior canals, ensure that only downward nystagmus is present with no spinning symptoms  Initiate low level balance interventions and continue safety education with assistive device    BALDEMAR SESAY, PT

## 2022-06-20 ENCOUNTER — CLINICAL SUPPORT (OUTPATIENT)
Dept: REHABILITATION | Facility: HOSPITAL | Age: 82
End: 2022-06-20
Payer: MEDICARE

## 2022-06-20 DIAGNOSIS — R26.81 GAIT INSTABILITY: Primary | ICD-10-CM

## 2022-06-20 PROCEDURE — 97112 NEUROMUSCULAR REEDUCATION: CPT | Mod: PN

## 2022-06-20 NOTE — PROGRESS NOTES
"[facility]   Physical Therapy Treatment Note     [facility]  [id number]    Therapy Diagnosis:   Encounter Diagnosis   Name Primary?    Gait instability Yes     Physician: Randal Chambers MD    Visit Date: 2022    Physician Orders: PT Eval and Treat   Medical Diagnosis from Referral:   Diagnosis   R26.81 (ICD-10-CM) - Gait instability   Also patient has recent orders of dizziness  Evaluation Date: 2022  Authorization Period Expiration: 2022  Plan of Care Expiration: 7/15/2022  Visit # / Visits authorized:     PTA Visit #: 0/5     Time In: 12:30 PM  Time Out: 1:24 PM  Total Billable Time: 54 minutes (4NMR)    SUBJECTIVE     Patient reports: Pt has not had a fall since Friday morning when in the bathroom.  Pt does report room spinning dizziness when "moving his head across the pillow" when he woke up this morning.   He was compliant with supine lying restrictions.  Response to previous treatment: one episode of dizziness  Functional change: no falls over the weekend, able to stand from lower sofa will less difficulty    Pain: 0/10  Location: N/A     OBJECTIVE     Objective Measures updated at progress report unless specified.        POSITIONAL CANAL TESTING  Looking for nystagmus (slow drift to affected side with quick correction away)    [place] Hallpike (posterior / CL anterior)   Right :  downbeating nystagmus that lasted >2 minutes (no torsional nystagmus)   Left: downbeating nystagmus lasting > 2 min (no torsional nystagmus)  Horizontal Canals   Right: negative   Left: negative  Treatment Performed:  No treatment indicated    Orthostatic assessment:  Supine: 142/82  Sittin/80  Standin/76    Treatment     Kenyon received the treatments listed below:        neuromuscular re-education activities to improve: Balance, Posture and canalith positioning for 54 minutes. The following activities were included:  - canal reassessment (see " "above)  - orthostatic vitals assessment (see above)    -- Reinforcement of fall/safety education initiated at evaluation  -- anterior posterior limits of stability 2 x 10 with CGA   -- sit to stand with task breakdown and focus on forward weight shift   Heavy verbal and tactile cuing 2 x 10  -- retro walking in // bars with bilateral upper extremity support and min assist to prevent posterior LOB  -- step ups on soft black step x 10 bilaterally with bilateral upper extremity support with heavy verbal and tactile cuing for forward weight shift.  -- WBOS on black soft step 30" x 3 with bilateral upper extremity support   -- overground walking 175' with 3 wheeled walker with CGA and cuing for upright posture, increased step length and keeping feet within walker     Home Exercises Provided and Patient Education Provided     Education provided:   - need to lock brakes with standing  - need to walk with rollator at all times  -  Need to perform the turn, square up, lock and sit method for safe transfers with AD  - need for home modifications and adaptations that family is recommending  - need to pull or or secure rugs in home  - need to stand and get initial standing balance for at least 5 seconds prior to standing  - need for consistent movement methods to increase likelyhood of safety in movement  - need to use rollator and not cart in the store with family  - U-step as options for AD   - education on lack of motor planning skills and balance     Written Home Exercises Provided: yes.  Exercises were reviewed and Kenyon was able to demonstrate them prior to the end of the session.  Kenyon demonstrated good  understanding of the education provided.      See EMR under Patient Instructions for exercises provided 6/6/2022.    ASSESSMENT     Downbeating nystagmus present in bilateral [place] Hallpike positions but no rotational nystagmus noted upon reassessment today.  Vital signs assessed in supine, sitting and standing position " to determine whether patient has orthostatic hypotension, mild drop noted from supine position as compared to standing position, but patient denies symptoms with transitions.  Session focused on improving awareness of anterior and posterior limits of stability as well as keeping whole foot flat with retro walking as well as sit to stand transfers.  Pt demonstrates poor carryover of safety education stressed throughout physical therapy session and continues to have difficulty with anterior weight shift during sit to stand transfer despite heavy tactile and verbal cuing.     Kenyon Is progressing well towards his goals.   Pt prognosis is Good.     Pt will continue to benefit from skilled outpatient physical therapy to address the deficits listed in the problem list box on initial evaluation, provide pt/family education and to maximize pt's level of independence in the home and community environment.     Pt's spiritual, cultural and educational needs considered and pt agreeable to plan of care and goals.     Anticipated barriers to physical therapy: age, h/o falls, prior CVA (per daughter's report) that has also impacted eye movements and mobility    Short term Goals:     1. 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  (PROGRESSING, NOT MET)   2. Pt will have No complaint of positional vertigo for 2 weeks with daily activity for improved function.  (PROGRESSING, NOT MET)  3. Pt will have 10% decrease in limitation on the vertigo FOTO survey for improved function. (PROGRESSING, NOT MET)  4. Patient will be able to stand with his feet together on a solid surface with eyes open for 30 seconds without LOB. (PROGRESSING, NOT MET)     Long Term Goals (8 Weeks):   1.  Independent with initial HEP. (PROGRESSING, NOT MET)  2. Pt will be able to perform TUG in 20 secs with use of AD demonstrating overall improved functional mobility.   (PROGRESSING, NOT MET)  3.  Pt will score greater than or equal to 28/56 on the Salinas test/assessment without use of AD demonstrating overall improved functional mobility. (PROGRESSING, NOT MET)  5. Pt will have less than 3 falls from start of PT sessions. (PROGRESSING, NOT MET)  6. Independent with updated HEP.  (PROGRESSING, NOT MET)  7. Pt will perform floor to stand f/b stand to floor transfer B UE support with stand by assist and no cues for improved dynamic transfer, core stability and fall safety in home and community. (PROGRESSING, NOT MET)  8. Pt will begin some form of community fitness to begin regular and consistent performance of exercise to continue maintenance of gains made in PT. (PROGRESSING, NOT MET)    PLAN   Reassess posterior canals, ensure that only downward nystagmus is present with no spinning symptoms  Continue low level balance interventions and continue safety education with assistive device.  Assess appropriate height of 3 wheeled walker.    Priscilla Cornejo, PT

## 2022-06-22 ENCOUNTER — CLINICAL SUPPORT (OUTPATIENT)
Dept: REHABILITATION | Facility: HOSPITAL | Age: 82
End: 2022-06-22
Payer: MEDICARE

## 2022-06-22 DIAGNOSIS — H81.13 BPPV (BENIGN PAROXYSMAL POSITIONAL VERTIGO), BILATERAL: ICD-10-CM

## 2022-06-22 DIAGNOSIS — R42 DIZZINESS: ICD-10-CM

## 2022-06-22 DIAGNOSIS — R26.81 GAIT INSTABILITY: ICD-10-CM

## 2022-06-22 DIAGNOSIS — R26.81 UNSTEADY GAIT: Primary | ICD-10-CM

## 2022-06-22 PROCEDURE — 97530 THERAPEUTIC ACTIVITIES: CPT | Mod: PN

## 2022-06-22 PROCEDURE — 97112 NEUROMUSCULAR REEDUCATION: CPT | Mod: PN

## 2022-06-22 NOTE — PROGRESS NOTES
[facility]   Physical Therapy Treatment Note     Name: Kenyon Mccarthy [facility] [place]  Clinic Number: [id number]    Therapy Diagnosis:   Encounter Diagnoses   Name Primary?    Unsteady gait Yes    Gait instability     BPPV (benign paroxysmal positional vertigo), bilateral     Dizziness      Physician: Randal Chambers MD    Visit Date: 6/22/2022    Physician Orders: PT Eval and Treat   Medical Diagnosis from Referral:   Diagnosis   R26.81 (ICD-10-CM) - Gait instability   Also patient has recent orders of dizziness  Evaluation Date: 6/6/2022  Authorization Period Expiration: 11/29/2022  Plan of Care Expiration: 7/15/2022  Visit # / Visits authorized: 5/ 12  FOTO issued 6/22/2022    PTA Visit #: 0/5     Time In: 10:16 AM  Time Out: 11:00 AM  Total Billable Time: 44 minutes (2 NMR, 1 TA)    SUBJECTIVE     Patient reports: no dizziness since last session.   He was compliant with supine lying restrictions.  Response to previous treatment: no dizziness since last session, now understands importance of safe sit to stand  Functional change: no falls over the weekend but did     Pain: 0/10  Location: N/A     OBJECTIVE     Objective Measures updated at progress report unless specified.      POSITIONAL CANAL TESTING  Looking for nystagmus (slow drift to affected side with quick correction away)    Treatment     Kenyon received the treatments listed below:      neuromuscular re-education activities to improve: Balance, Posture and stability for 30 minutes. The following activities were included:    -- forward reach ~5 inches with return to upright standing x 10 reaching for cone on table top (1 LOB)  -- standing feet apart   chest press x 20  -- retro walking in // bars with bilateral upper extremity support and min assist to prevent posterior LOB  -- step ups on soft black step x 10 bilaterally with bilateral upper extremity support with heavy verbal and tactile cuing for forward weight shift.  -- WBOS on  "black soft step 30" x 3 with bilateral upper extremity support   -- overground walking 175' with 3 wheeled walker with CGA and cuing for upright posture, increased step length and keeping feet within walker  -- standing feet together eyes open  3 x 30''      Patient performed functional therapeutic activities x 14 consisting of chair --transfers with rollator x 8 total transfers with 2 arm chairs (emphasize on locking prior to sit and stand and emphasis on squaring up to chair and sitting controlled).    -- cone weaving   30 ft x 4 trials with CGA and rollator  Home Exercises Provided and Patient Education Provided     Education provided:   -- Reinforcement of fall/safety education initiated at evaluation  - need to lock brakes with standing  - need to walk with rollator at all times  -  Need to perform the turn, square up, lock and sit method for safe transfers with AD  - need for home modifications and adaptations that family is recommending  - need to pull or or secure rugs in home  - need to stand and get initial standing balance for at least 5 seconds prior to standing  - need for consistent movement methods to increase likelyhood of safety in movement  - need to use rollator and not cart in the store with family  - U-step as options for AD   - education on lack of motor planning skills and balance     Written Home Exercises Provided: yes.  Exercises were reviewed and Kenyon was able to demonstrate them prior to the end of the session.  Kenyon demonstrated good  understanding of the education provided.      See EMR under Patient Instructions for exercises provided 6/6/2022.    ASSESSMENT     Kenyon performed balance training well with 2 seated rests.  He no longer c/o dizziness and the focus is now shifting to stability and safety during balance and transfer tasks.  Session focused on improving awareness of anterior and posterior limits of stability as well as keeping whole foot flat with retro walking as well as " sit to stand transfers.  Pt demonstrates improved carryover of safety education stressed throughout physical therapy session.     Kenyon Is progressing well towards his goals.   Pt prognosis is Good.     Pt will continue to benefit from skilled outpatient physical therapy to address the deficits listed in the problem list box on initial evaluation, provide pt/family education and to maximize pt's level of independence in the home and community environment.     Pt's spiritual, cultural and educational needs considered and pt agreeable to plan of care and goals.     Anticipated barriers to physical therapy: age, h/o falls, prior CVA (per daughter's report) that has also impacted eye movements and mobility    Short term Goals:     1. 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  (PROGRESSING, NOT MET)   2. Pt will have No complaint of positional vertigo for 2 weeks with daily activity for improved function.  (PROGRESSING, NOT MET)  3. Pt will have 10% decrease in limitation on the vertigo FOTO survey for improved function. (PROGRESSING, NOT MET)  4. Patient will be able to stand with his feet together on a solid surface with eyes open for 30 seconds without LOB. (PROGRESSING, NOT MET)     Long Term Goals (8 Weeks):   1.  Independent with initial HEP. (PROGRESSING, NOT MET)  2. Pt will be able to perform TUG in 20 secs with use of AD demonstrating overall improved functional mobility.  (PROGRESSING, NOT MET)  3.  Pt will score greater than or equal to 28/56 on the Salinas test/assessment without use of AD demonstrating overall improved functional mobility. (PROGRESSING, NOT MET)  5. Pt will have less than 3 falls from start of PT sessions. (PROGRESSING, NOT MET)  6. Independent with updated HEP.  (PROGRESSING, NOT MET)  7. Pt will perform floor to stand f/b stand to floor transfer B UE support with stand by assist and no cues for  improved dynamic transfer, core stability and fall safety in home and community. (PROGRESSING, NOT MET)  8. Pt will begin some form of community fitness to begin regular and consistent performance of exercise to continue maintenance of gains made in PT. (PROGRESSING, NOT MET)    PLAN   Reassess posterior canals, ensure that only downward nystagmus is present with no spinning symptoms  Continue low level balance interventions and continue safety education with assistive device.  Assess appropriate height of 3 wheeled walker.    Shea Busby, PT

## 2022-06-27 ENCOUNTER — CLINICAL SUPPORT (OUTPATIENT)
Dept: REHABILITATION | Facility: HOSPITAL | Age: 82
End: 2022-06-27
Payer: MEDICARE

## 2022-06-27 DIAGNOSIS — H81.13 BPPV (BENIGN PAROXYSMAL POSITIONAL VERTIGO), BILATERAL: ICD-10-CM

## 2022-06-27 DIAGNOSIS — R42 DIZZINESS: ICD-10-CM

## 2022-06-27 DIAGNOSIS — R26.81 GAIT INSTABILITY: Primary | ICD-10-CM

## 2022-06-27 DIAGNOSIS — R26.81 UNSTEADY GAIT: ICD-10-CM

## 2022-06-27 PROCEDURE — 97530 THERAPEUTIC ACTIVITIES: CPT | Mod: PN

## 2022-06-27 PROCEDURE — 97112 NEUROMUSCULAR REEDUCATION: CPT | Mod: PN

## 2022-06-27 NOTE — PROGRESS NOTES
[facility]   Physical Therapy Treatment Note     Name: Kenyon Mccarthy [facility] [place]  Clinic Number: [id number]    Therapy Diagnosis:   Encounter Diagnoses   Name Primary?    Gait instability Yes    Unsteady gait     BPPV (benign paroxysmal positional vertigo), bilateral     Dizziness      Physician: Randal Chambers MD    Visit Date: 6/27/2022    Physician Orders: PT Eval and Treat   Medical Diagnosis from Referral:   Diagnosis   R26.81 (ICD-10-CM) - Gait instability   Also patient has recent orders of dizziness  Evaluation Date: 6/6/2022  Authorization Period Expiration: 11/29/2022  Plan of Care Expiration: 7/15/2022  Visit # / Visits authorized: 6/ 12  FOTO issued 6/22/2022    PTA Visit #: 0/5     Time In: 11:02 AM  Time Out: 11:45 AM  Total Billable Time: 43 minutes (2 NMR, 1 TA)    SUBJECTIVE     Patient reports: no dizziness since last session. Daughter in law inquired about likelihood of patient being able to drive independently.    He was compliant with supine lying restrictions.  Response to previous treatment: improved use of brakes on rollator   Functional change: no falls over the weekend but has some movements of posterior LOB     Pain: 0/10  Location: N/A     OBJECTIVE     None performed today     Treatment     Kenyon received the treatments listed below:      neuromuscular re-education activities to improve: Balance, Posture and stability for 30 minutes. The following activities were included:    -- standing feet apart on airex   3 x 30'' EO  -- standing feet apart on floor  3 x 60 secs chest pass with lightweight ball  -- standing fee apart on floor  3 x 60 secs PB bounce passes    -- step ups on soft black step x 10 bilaterally with bilateral upper extremity support with heavy verbal and tactile cuing for forward weight shift.   -- overground walking 300' x 2 (once at the start of the session and once at the end of the session) with 3 wheeled walker with CGA and cuing for upright  posture, increased step length and keeping feet within walker        Patient performed functional therapeutic activities x 13 consisting of chair --transfers with rollator x 20 total transfers with 2 arm chairs (emphasize on locking prior to sit and stand and emphasis on squaring up to chair and sitting controlled).     Home Exercises Provided and Patient Education Provided     Education provided:   -- lack of safety and consistency with safety that would impact safe driving  -- Reinforcement of fall/safety education initiated at evaluation  - need to lock brakes with standing  - need to walk with rollator at all times  -  Need to perform the turn, square up, lock and sit method for safe transfers with AD  - need to stand and get initial standing balance for at least 5 seconds prior to standing  - need for consistent movement methods to increase likelyhood of safety in movement  - need to use rollator and not cart in the store with family   - education on lack of motor planning skills and balance     Written Home Exercises Provided: yes.  Exercises were reviewed and Kenyon was able to demonstrate them prior to the end of the session.  Kenyon demonstrated good  understanding of the education provided.      See EMR under Patient Instructions for exercises provided 6/6/2022.    ASSESSMENT     Kenyon performed balance training well with 3 seated rests.  He still needs verbal cues for a completely safe transfer.  He was able to progress to catch and toss standing balance activities but had several LOB that were corrected with UE support on / bar.  He is improving in anterior weight shifts and overall righting but is still unsafe to stand or walk without AD.  Pt demonstrates improved carryover of safety education stressed throughout physical therapy session.     Kenyon Is progressing well towards his goals.   Pt prognosis is Good.     Pt will continue to benefit from skilled outpatient physical therapy to address the deficits  listed in the problem list box on initial evaluation, provide pt/family education and to maximize pt's level of independence in the home and community environment.     Pt's spiritual, cultural and educational needs considered and pt agreeable to plan of care and goals.     Anticipated barriers to physical therapy: age, h/o falls, prior CVA (per daughter's report) that has also impacted eye movements and mobility    Short term Goals:     1. 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  (PROGRESSING, NOT MET)   2. Pt will have No complaint of positional vertigo for 2 weeks with daily activity for improved function.  (PROGRESSING, NOT MET)  3. Pt will have 10% decrease in limitation on the vertigo FOTO survey for improved function. (PROGRESSING, NOT MET)  4. Patient will be able to stand with his feet together on a solid surface with eyes open for 30 seconds without LOB. (PROGRESSING, NOT MET)     Long Term Goals (8 Weeks):   1.  Independent with initial HEP. (PROGRESSING, NOT MET)  2. Pt will be able to perform TUG in 20 secs with use of AD demonstrating overall improved functional mobility.  (PROGRESSING, NOT MET)  3.  Pt will score greater than or equal to 28/56 on the Salinas test/assessment without use of AD demonstrating overall improved functional mobility. (PROGRESSING, NOT MET)  5. Pt will have less than 3 falls from start of PT sessions. (PROGRESSING, NOT MET)  6. Independent with updated HEP.  (PROGRESSING, NOT MET)  7. Pt will perform floor to stand f/b stand to floor transfer B UE support with stand by assist and no cues for improved dynamic transfer, core stability and fall safety in home and community. (PROGRESSING, NOT MET)  8. Pt will begin some form of community fitness to begin regular and consistent performance of exercise to continue maintenance of gains made in PT. (PROGRESSING, NOT MET)    PLAN     Continue low  level balance interventions and continue safety education with assistive device.  Assess appropriate height of 3 wheeled walker.    Shea Busby, PT

## 2022-06-29 ENCOUNTER — CLINICAL SUPPORT (OUTPATIENT)
Dept: REHABILITATION | Facility: HOSPITAL | Age: 82
End: 2022-06-29
Payer: MEDICARE

## 2022-06-29 DIAGNOSIS — R26.81 GAIT INSTABILITY: Primary | ICD-10-CM

## 2022-06-29 PROCEDURE — 97112 NEUROMUSCULAR REEDUCATION: CPT | Mod: PN

## 2022-06-29 PROCEDURE — 97530 THERAPEUTIC ACTIVITIES: CPT | Mod: PN

## 2022-06-29 NOTE — PROGRESS NOTES
[facility]   Physical Therapy Treatment Note     Name: Kenyon Mccarthy [facility] Number: [id number]    Therapy Diagnosis:   Encounter Diagnosis   Name Primary?    Gait instability Yes     Physician: Randal Chambers MD    Visit Date: 6/29/2022    Physician Orders: PT Eval and Treat   Medical Diagnosis from Referral:   Diagnosis   R26.81 (ICD-10-CM) - Gait instability   Also patient has recent orders of dizziness  Evaluation Date: 6/6/2022  Authorization Period Expiration: 11/29/2022  Plan of Care Expiration: 7/15/2022  Visit # / Visits authorized: 7/ 12  FOTO issued 6/22/2022    PTA Visit #: 0/5     Time In: 11:48 AM  Time Out: 12:30 PM  Total Billable Time: 42 minutes (2 NMR, 1 TA)    SUBJECTIVE     Patient reports: no dizziness since last session.    He was is attempting to be compliant with safety recommendations  Response to previous treatment: improved use of brakes on rollator   Functional change: no falls but continued excessive posterior lean     Pain: 0/10  Location: N/A     OBJECTIVE     None performed today     Treatment     Kenyon received the treatments listed below:      neuromuscular re-education activities to improve: Balance, Posture and stability for 30 minutes. The following activities were included:    -- standing feet apart on airex   3 x 30'' EO  -- side stepping with single upper extremity support 10' x 6  -- retro walking with single upper extremity support 10' x 8 with cuing for base of support and weight shifting.  -- step ups on soft black step x 10 bilaterally with bilateral upper extremity support with heavy verbal and tactile cuing for forward weight shift.   -- overground walking 300' x 2 (once at the start of the session and once at the end of the session) with 3 wheeled walker with CGA and cuing for upright posture, increased step length and keeping feet within walker     Not performed:  -- standing feet apart on floor  3 x 60 secs chest pass with  lightweight ball  -- standing feet apart on floor  3 x 60 secs PB bounce passes     Patient performed functional therapeutic activities x 15 min consisting of chair --transfers with rollator x 20 total transfers with 2 arm chairs (emphasize on locking prior to sit and stand and emphasis on squaring up to chair and sitting controlled).   -- stand pivot transfers from mat to chair with arms x 15     Home Exercises Provided and Patient Education Provided     Education provided:   -- lack of safety and consistency with safety that would impact safe driving  -- Reinforcement of fall/safety education initiated at evaluation  - need to lock brakes with standing  - need to walk with rollator at all times  -  Need to perform the turn, square up, lock and sit method for safe transfers with AD  - need to stand and get initial standing balance for at least 5 seconds prior to standing  - need for consistent movement methods to increase likelyhood of safety in movement  - need to use rollator and not cart in the store with family   - education on lack of motor planning skills and balance     Written Home Exercises Provided: yes.  Exercises were reviewed and Kenyon was able to demonstrate them prior to the end of the session.  Kenyon demonstrated good  understanding of the education provided.      See EMR under Patient Instructions for exercises provided 6/6/2022.    ASSESSMENT     Kenyon performed balance training well with consistent cuing for increased safety awareness.   Pt had several LOB that were corrected with UE support on / bar during backwards walking.  He is improving in anterior weight shifts and overall righting but is still unsafe to stand or walk without AD.  Pt demonstrates improving carryover of safety education stressed throughout physical therapy session but still requires frequent cuing for safety.     Kenyon Is progressing well towards his goals.   Pt prognosis is Good.     Pt will continue to benefit from  skilled outpatient physical therapy to address the deficits listed in the problem list box on initial evaluation, provide pt/family education and to maximize pt's level of independence in the home and community environment.     Pt's spiritual, cultural and educational needs considered and pt agreeable to plan of care and goals.     Anticipated barriers to physical therapy: age, h/o falls, prior CVA (per daughter's report) that has also impacted eye movements and mobility    Short term Goals:     1. 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  (PROGRESSING, NOT MET)   2. Pt will have No complaint of positional vertigo for 2 weeks with daily activity for improved function.  (PROGRESSING, NOT MET)  3. Pt will have 10% decrease in limitation on the vertigo FOTO survey for improved function. (PROGRESSING, NOT MET)  4. Patient will be able to stand with his feet together on a solid surface with eyes open for 30 seconds without LOB. (PROGRESSING, NOT MET)     Long Term Goals (8 Weeks):   1.  Independent with initial HEP. (PROGRESSING, NOT MET)  2. Pt will be able to perform TUG in 20 secs with use of AD demonstrating overall improved functional mobility.  (PROGRESSING, NOT MET)  3.  Pt will score greater than or equal to 28/56 on the Salinas test/assessment without use of AD demonstrating overall improved functional mobility. (PROGRESSING, NOT MET)  5. Pt will have less than 3 falls from start of PT sessions. (PROGRESSING, NOT MET)  6. Independent with updated HEP.  (PROGRESSING, NOT MET)  7. Pt will perform floor to stand f/b stand to floor transfer B UE support with stand by assist and no cues for improved dynamic transfer, core stability and fall safety in home and community. (PROGRESSING, NOT MET)  8. Pt will begin some form of community fitness to begin regular and consistent performance of exercise to continue maintenance of gains  made in PT. (PROGRESSING, NOT MET)    PLAN     Continue low level balance interventions and continue safety education with assistive device.  Assess appropriate height of 3 wheeled walker.    Priscilla Cornejo, PT

## 2022-07-06 ENCOUNTER — CLINICAL SUPPORT (OUTPATIENT)
Dept: REHABILITATION | Facility: HOSPITAL | Age: 82
End: 2022-07-06
Payer: MEDICARE

## 2022-07-06 DIAGNOSIS — R26.81 GAIT INSTABILITY: Primary | ICD-10-CM

## 2022-07-06 DIAGNOSIS — R26.81 UNSTEADY GAIT: ICD-10-CM

## 2022-07-06 DIAGNOSIS — H81.13 BPPV (BENIGN PAROXYSMAL POSITIONAL VERTIGO), BILATERAL: ICD-10-CM

## 2022-07-06 DIAGNOSIS — R42 DIZZINESS: ICD-10-CM

## 2022-07-06 PROCEDURE — 97112 NEUROMUSCULAR REEDUCATION: CPT | Mod: PN

## 2022-07-06 NOTE — PROGRESS NOTES
"[facility]   Physical Therapy Treatment Note     Name: Kenyon Mccarthy [facility] [place]  Clinic Number: [id number]    Therapy Diagnosis:   Encounter Diagnoses   Name Primary?    Gait instability Yes    Unsteady gait     BPPV (benign paroxysmal positional vertigo), bilateral     Dizziness      Physician: No ref. provider found    Visit Date: 7/6/2022    Physician Orders: PT Eval and Treat   Medical Diagnosis from Referral:   Diagnosis   R26.81 (ICD-10-CM) - Gait instability   Also patient has recent orders of dizziness  Evaluation Date: 6/6/2022  Authorization Period Expiration: 11/29/2022  Plan of Care Expiration: 7/15/2022  Visit # / Visits authorized: 8/ 12  FOTO issued 6/22/2022    PTA Visit #: 0/5     Time In: 11:45 AM  Time Out: 12:30 PM  Total Billable Time: 45 minutes (3 NMR)    SUBJECTIVE     Patient reports: no dizziness but he has had one fall reaching for an item in a seated position in a chair.  He also walked a great deal multiple days in a row per his daughter, "so his legs are very shaky this week".    He was is attempting to be compliant with safety recommendations  Response to previous treatment: improved use of brakes on rollator   Functional change: no falls but continued excessive posterior lean     Pain: 0/10  Location: N/A     OBJECTIVE     None performed today     Treatment     Kenyon received the treatments listed below:      neuromuscular re-education activities to improve: Balance, Posture and stability for 30 minutes. The following activities were included:    -- standing feet apart on airex   3 x 30'' EO  -- side stepping with single upper extremity support 10' x 6  -- retro walking with single upper extremity support 10' x 8 with cuing for base of support and weight shifting.  -- step ups on soft black step x 10 bilaterally with bilateral upper extremity support with heavy verbal and tactile cuing for forward weight shift.   -- overground walking 300' x 2 (once at the " start of the session and once at the end of the session) with 3 wheeled walker with CGA and cuing for upright posture, increased step length and keeping feet within walker     Not performed:  -- standing feet apart on floor  3 x 60 secs chest pass with lightweight ball  -- standing feet apart on floor  3 x 60 secs PB bounce passes     Patient performed functional therapeutic activities x 15 min consisting of chair --transfers with rollator x 20 total transfers with 2 arm chairs (emphasize on locking prior to sit and stand and emphasis on squaring up to chair and sitting controlled).   -- stand pivot transfers from mat to chair with arms x 15     Home Exercises Provided and Patient Education Provided     Education provided:   -- lack of safety and consistency with safety that would impact safe driving  -- Reinforcement of fall/safety education initiated at evaluation  - need to lock brakes with standing  - need to walk with rollator at all times  -  Need to perform the turn, square up, lock and sit method for safe transfers with AD  - need to stand and get initial standing balance for at least 5 seconds prior to standing  - need for consistent movement methods to increase likelyhood of safety in movement  - need to use rollator and not cart in the store with family   - education on lack of motor planning skills and balance     Written Home Exercises Provided: yes.  Exercises were reviewed and Kenyon was able to demonstrate them prior to the end of the session.  Kenyon demonstrated good  understanding of the education provided.      See EMR under Patient Instructions for exercises provided 6/6/2022.    ASSESSMENT     Kenyon performed balance training well with consistent cuing for increased safety awareness.   He has some LOB laterally and backwards due to posterior lean but less than usually.  He is improving in anterior weight shifts and overall righting but is still unsafe to stand or walk without AD.  Pt demonstrates  improving carryover of safety education stressed throughout physical therapy session but still requires frequent cuing for safety.     Kenyon Is progressing well towards his goals.   Pt prognosis is Good.     Pt will continue to benefit from skilled outpatient physical therapy to address the deficits listed in the problem list box on initial evaluation, provide pt/family education and to maximize pt's level of independence in the home and community environment.     Pt's spiritual, cultural and educational needs considered and pt agreeable to plan of care and goals.     Anticipated barriers to physical therapy: age, h/o falls, prior CVA (per daughter's report) that has also impacted eye movements and mobility    Short term Goals:     1. 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  (PROGRESSING, NOT MET)   2. Pt will have No complaint of positional vertigo for 2 weeks with daily activity for improved function.  (PROGRESSING, NOT MET)  3. Pt will have 10% decrease in limitation on the vertigo FOTO survey for improved function. (PROGRESSING, NOT MET)  4. Patient will be able to stand with his feet together on a solid surface with eyes open for 30 seconds without LOB. (PROGRESSING, NOT MET)     Long Term Goals (8 Weeks):   1.  Independent with initial HEP. (PROGRESSING, NOT MET)  2. Pt will be able to perform TUG in 20 secs with use of AD demonstrating overall improved functional mobility.  (PROGRESSING, NOT MET)  3.  Pt will score greater than or equal to 28/56 on the Salinas test/assessment without use of AD demonstrating overall improved functional mobility. (PROGRESSING, NOT MET)  5. Pt will have less than 3 falls from start of PT sessions. (PROGRESSING, NOT MET)  6. Independent with updated HEP.  (PROGRESSING, NOT MET)  7. Pt will perform floor to stand f/b stand to floor transfer B UE support with stand by assist and no cues for  improved dynamic transfer, core stability and fall safety in home and community. (PROGRESSING, NOT MET)  8. Pt will begin some form of community fitness to begin regular and consistent performance of exercise to continue maintenance of gains made in PT. (PROGRESSING, NOT MET)    PLAN     Continue low level balance interventions and continue safety education with assistive device.  Assess appropriate height of 3 wheeled walker.    Shea Busby, PT   [facility]   Physical Therapy Treatment Note     [place] [facility] [place] Number: [id number]    Therapy Diagnosis:   Encounter Diagnoses   Name Primary?    Gait instability Yes    Unsteady gait     BPPV (benign paroxysmal positional vertigo), bilateral     Dizziness      Physician: No ref. provider found    Visit Date: 7/6/2022    Physician Orders: PT Eval and Treat   Medical Diagnosis from Referral:   Diagnosis   R26.81 (ICD-10-CM) - Gait instability   Also patient has recent orders of dizziness  Evaluation Date: 6/6/2022  Authorization Period Expiration: 11/29/2022  Plan of Care Expiration: 7/15/2022  Visit # / Visits authorized: 8/ 12  FOTO issued 6/22/2022    PTA Visit #: 0/5     Time In: 11:45 AM  Time Out: 12:30 PM  Total Billable Time: 45 minutes (3 NMR)    SUBJECTIVE     Patient reports: no dizziness since last session.    He was is attempting to be compliant with safety recommendations  Response to previous treatment: improved use of brakes on rollator   Functional change: no falls but continued excessive posterior lean     Pain: 0/10  Location: N/A     OBJECTIVE     None performed today     Treatment     Kenyon received the treatments listed below:      neuromuscular re-education activities to improve: Balance, Posture and stability for 40 minutes. The following activities were included:    -- standing feet apart on airex   3 x 30'' EO  -- toe taps on 4'' step    3 x 30'' with UE support as needed and contact guard to  min A and max cues  -- standing feet apart on floor  3 x 60 secs chest pass with lightweight ball  -- standing feet apart on floor  3 x 60 secs PB bounce passes  -- standing feet apart on floor  Reach + trunk twist 2 x 6 each way with table top in front to reach for cone and patient instructed to reach behind to stack cones on table  -- step ups on soft black step x 10 bilaterally with bilateral upper extremity support with heavy verbal and tactile cuing for forward weight shift.   -- overground walking 200' x 2 (once at the start of the session and once at the end of the session) with 3 wheeled walker with CGA and cuing for upright posture, increased step length and keeping feet within walker       Patient performed functional therapeutic activities x  5 min   - sit to stand transfers x 8 with cues for locking brakes and pushing from chair.  Home Exercises Provided and Patient Education Provided     Education provided:   -- lack of safety and consistency with safety that would impact safe driving  -- Reinforcement of fall/safety education initiated at evaluation  - need to lock brakes with standing  - need to walk with rollator at all times  -  Need to perform the turn, square up, lock and sit method for safe transfers with AD  - need to stand and get initial standing balance for at least 5 seconds prior to standing  - need for consistent movement methods to increase likelyhood of safety in movement  - need to use rollator and not cart in the store with family   - education on lack of motor planning skills and balance     Written Home Exercises Provided: yes.  Exercises were reviewed and Kenyon was able to demonstrate them prior to the end of the session.  Kenyon demonstrated good  understanding of the education provided.      See EMR under Patient Instructions for exercises provided 6/6/2022.    ASSESSMENT     Kenyon performed balance training well with consistent cuing for increased safety awareness.   Pt had  several LOB that were corrected with UE support on / bar during backwards walking.  He is improving in anterior weight shifts and overall righting but is still unsafe to stand or walk without AD.  Pt demonstrates improving carryover of safety education stressed throughout physical therapy session but still requires frequent cuing for safety.     Kenyon Is progressing well towards his goals.   Pt prognosis is Good.     Pt will continue to benefit from skilled outpatient physical therapy to address the deficits listed in the problem list box on initial evaluation, provide pt/family education and to maximize pt's level of independence in the home and community environment.     Pt's spiritual, cultural and educational needs considered and pt agreeable to plan of care and goals.     Anticipated barriers to physical therapy: age, h/o falls, prior CVA (per daughter's report) that has also impacted eye movements and mobility    Short term Goals:     1. 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  MET  2. Pt will have No complaint of positional vertigo for 2 weeks with daily activity for improved function.   MET  3. Pt will have 10% decrease in limitation on the vertigo FOTO survey for improved function. goal d/c'd vertigo survey not issued  4. Patient will be able to stand with his feet together on a solid surface with eyes open for 30 seconds without LOB. (PROGRESSING, NOT MET)     Long Term Goals (8 Weeks):   1.  Independent with initial HEP. (PROGRESSING, NOT MET)  2. Pt will be able to perform TUG in 20 secs with use of AD demonstrating overall improved functional mobility.  (PROGRESSING, NOT MET)  3.  Pt will score greater than or equal to 28/56 on the Salinas test/assessment without use of AD demonstrating overall improved functional mobility. (PROGRESSING, NOT MET)  5. Pt will have less than 3 falls from start of PT sessions.  (PROGRESSING, NOT MET)  6. Independent with updated HEP.  (PROGRESSING, NOT MET)  7. Pt will perform floor to stand f/b stand to floor transfer B UE support with stand by assist and no cues for improved dynamic transfer, core stability and fall safety in home and community. (PROGRESSING, NOT MET)  8. Pt will begin some form of community fitness to begin regular and consistent performance of exercise to continue maintenance of gains made in PT. MET weekly use of stationary bike    PLAN     Continue low level balance interventions and continue safety education with assistive device.    -- backward walking  -- chair to chair transfers with proper sequence and safety techniques    Shea Busby, PT

## 2022-07-13 ENCOUNTER — CLINICAL SUPPORT (OUTPATIENT)
Dept: REHABILITATION | Facility: HOSPITAL | Age: 82
End: 2022-07-13
Payer: MEDICARE

## 2022-07-13 DIAGNOSIS — H81.13 BPPV (BENIGN PAROXYSMAL POSITIONAL VERTIGO), BILATERAL: ICD-10-CM

## 2022-07-13 DIAGNOSIS — R42 DIZZINESS: ICD-10-CM

## 2022-07-13 DIAGNOSIS — R26.81 GAIT INSTABILITY: Primary | ICD-10-CM

## 2022-07-13 DIAGNOSIS — R26.81 UNSTEADY GAIT: ICD-10-CM

## 2022-07-13 PROCEDURE — 97112 NEUROMUSCULAR REEDUCATION: CPT | Mod: PN

## 2022-07-13 PROCEDURE — 97530 THERAPEUTIC ACTIVITIES: CPT | Mod: PN

## 2022-07-13 NOTE — PROGRESS NOTES
[facility]   Physical Therapy Treatment Note     [place]: [place] Mccarthy [facility] [place]  Clinic Number: [id number]    Therapy Diagnosis:   Encounter Diagnoses   Name Primary?    Gait instability Yes    Unsteady gait     BPPV (benign paroxysmal positional vertigo), bilateral     Dizziness      Physician: Randal Chambers MD    Visit Date: 7/13/2022    Physician Orders: PT Eval and Treat   Medical Diagnosis from Referral:   Diagnosis   R26.81 (ICD-10-CM) - Gait instability   Also patient has recent orders of dizziness  Evaluation Date: 6/6/2022  Authorization Period Expiration: 11/29/2022  Plan of Care Expiration: 7/15/2022  Visit # / Visits authorized: 9/12  FOTO issued 6/22/2022    PTA Visit #: 0/5     Time In: 10:15 AM  Time Out: 11:00 AM   Total Billable Time: 45 minutes (2 NMR, 1 TA)    SUBJECTIVE     Patient reports: that he's been having reoccurring dizziness since Friday. He states that dizziness occurs when he is lying down and sitting up in bed. He also states that he has some concerns with medication prescribed right before initiation of physical therapy. Pt feels as though medication causes him to feel lightheaded. His daughter-in-law is present, she reports that his wife is concerned that the pt is ambulating throughout the home without RW.    He is attempting to be compliant with safety recommendations  Response to previous treatment: no adverse response   Functional change: no falls but continued excessive posterior lean     Pain: 0/10  Location: N/A     OBJECTIVE     [place] Hallpike (posterior / CL anterior)   Right : positive for torsional nystagmus; down beating also present as expected    Left: not tested   Treatment Performed: Epley's Maneuver x 2     Treatment     Kenyon received the treatments listed below:      neuromuscular re-education activities to improve: Balance, Posture and stability for 35 minutes. The following activities were included:  -- see objective section for [place]  "[place]iffke and Epley's Maneuver  --step up to 4" soft step with BUE support with verbal cueing to encourage forward weight shift  -- overground walking 150' x 1  with 3 wheeled walker with CGA and cuing for upright posture, increased step length and keeping feet within walker    Patient performed functional therapeutic activities x 10 min consisting of chair --transfers with rollator x 20 total transfers with 2 arm chairs (emphasize on locking prior to sit and stand and emphasis on squaring up to chair and sitting controlled).     Not performed:  -- standing feet apart on floor  3 x 60 secs chest pass with lightweight ball  -- standing feet apart on floor  3 x 60 secs PB bounce passes   -- standing feet apart on airex   3 x 30'' EO  -- side stepping with single upper extremity support 10' x 6  -- retro walking with single upper extremity support 10' x 8 with cuing for base of support and weight shifting.   -- stand pivot transfers from mat to chair with arms x 15     Home Exercises Provided and Patient Education Provided     Education provided:   -- lack of safety and consistency with safety that would impact safe driving  -- Reinforcement of fall/safety education initiated at evaluation  - need to lock brakes with standing  - need to walk with rollator at all times  -  Need to perform the turn, square up, lock and sit method for safe transfers with AD  - need to stand and get initial standing balance for at least 5 seconds prior to standing  - need for consistent movement methods to increase likelyhood of safety in movement  - need to use rollator and not cart in the store with family   - education on lack of motor planning skills and balance     Written Home Exercises Provided: yes.  Exercises were reviewed and Kenyon was able to demonstrate them prior to the end of the session.  Kenyon demonstrated good  understanding of the education provided.      See EMR under Patient Instructions for exercises provided " 6/6/2022.    ASSESSMENT     Kenyon arrived to clinic with RW and demonstrated improved trunk position with ambulation throughout the gym. During ambulation, pt c/o increased dizziness and requested for therapist to reassess for BPPV.  Pt exhibited a positive R [place]-Hallpike and SPT performed two rounds of the Epley Maneuver which completely relieved patient's symptoms. He responded to treatment immediately and stated that he felt that his walking improved after completion of the Epley's Maneuver. During chair to chair transfers pt continues to require verbal cueing to pull RW closer before initiating transfer and to use at least one UE to push up from stable surface. Pt experienced one LOB episode, but quickly recovered and returned to activity. SPT advised pt to voice concerns regarding medications to PCP to assess for associated side effects.      Kenyon Is progressing well towards his goals.   Pt prognosis is Good.     Pt will continue to benefit from skilled outpatient physical therapy to address the deficits listed in the problem list box on initial evaluation, provide pt/family education and to maximize pt's level of independence in the home and community environment.     Pt's spiritual, cultural and educational needs considered and pt agreeable to plan of care and goals.     Anticipated barriers to physical therapy: age, h/o falls, prior CVA (per daughter's report) that has also impacted eye movements and mobility    Short term Goals:     1. 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  MET  2. Pt will have No complaint of positional vertigo for 2 weeks with daily activity for improved function.  (PROGRESSING, NOT MET)  3. Pt will have 10% decrease in limitation on the vertigo FOTO survey for improved function. (PROGRESSING, NOT MET)  4. Patient will be able to stand with his feet together on a solid surface with eyes open  for 30 seconds without LOB. (PROGRESSING, NOT MET)     Long Term Goals (8 Weeks):   1.  Independent with initial HEP. (PROGRESSING, NOT MET)  2. Pt will be able to perform TUG in 20 secs with use of AD demonstrating overall improved functional mobility.  (PROGRESSING, NOT MET)  3.  Pt will score greater than or equal to 28/56 on the Salinas test/assessment without use of AD demonstrating overall improved functional mobility. (PROGRESSING, NOT MET)  5. Pt will have less than 3 falls from start of PT sessions. (PROGRESSING, NOT MET)  6. Independent with updated HEP.  (PROGRESSING, NOT MET)  7. Pt will perform floor to stand f/b stand to floor transfer B UE support with stand by assist and no cues for improved dynamic transfer, core stability and fall safety in home and community. (PROGRESSING, NOT MET)  8. Pt will begin some form of community fitness to begin regular and consistent performance of exercise to continue maintenance of gains made in PT. (PROGRESSING, NOT MET)    PLAN     Continue with treatment per POC and as tolerated. Update POC versus d/c bennie SESAY, PT

## 2022-07-13 NOTE — PLAN OF CARE
[facility]  Physical Therapy Neurological Rehabilitation Initial Evaluation    Name: Kenyon Hunt  Clinic Number: [id number]    Therapy Diagnosis: No diagnosis found.  Physician: Randal Chambers MD    Physician Orders: {AMB PT KNEE ORDERS:05040} ***  Medical Diagnosis from Referral: ***  Evaluation Date: 7/13/2022  Authorization Period Expiration: ***  Plan of Care Expiration: ***  Visit # / Visits authorized: ***/ ***    Time In: ***  Time Out: ***  Total Billable Time: *** minutes    Precautions: {IP WOUND PRECAUTIONS OHS:33303}    Subjective   Date of onset: ***  History of current condition - Kenyon reports: ***     Medical History:   Past Medical History:   Diagnosis Date    Strabismus        Surgical History:   Kenyon Hunt  has a past surgical history that includes Shoulder surgery; pace maker; and Cataract extraction w/  intraocular lens implant (Bilateral).    Medications:   Kenyon has a current medication list which includes the following prescription(s): ascorbic acid (vitamin c), aspirin, atorvastatin, baclofen, bupropion, calcium carbonate, carbidopa-levodopa  mg, carbidopa-levodopa  mg, coenzyme q10, ergocalciferol (vitamin d2), escitalopram oxalate, fluticasone propionate, irbesartan, lorazepam, melatonin, melatonin-pyridoxine hcl (b6), metoprolol tartrate, milk thistle, multivit with minerals/lutein, omega-3 fatty acids, pantoprazole, pantoprazole, potassium chloride, potassium, tamsulosin, turmeric, vitamin d, and zinc.    Allergies:   Review of patient's allergies indicates:   Allergen Reactions    Carbamazepine Rash        Imaging, {Mri/ctscan/bone scan:54066}: ***    Prior Therapy: ***  Social History: *** {LIVES WITH:40187}  Falls: ***   DME: {AMB OT NEURO DME:68279} ***   Home Environment: ***   Exercise Routine / History: ***   Family Present at time of Eval: ***   Occupation: ***  Prior Level of Function: ***  Current Level of Function:  "***    Pain:  Current {0-10:20507::"0"}/10, worst {0-10:20507::"0"}/10, best {0-10:20507::"0"}/10   Location: {RIGHT LEFT BILATERAL:94961} {LOCATION ON BODY:09280}  Description: {Pain Description:02016}  Aggravating Factors: {Causes; Pain:06879}  Easing Factors: {Pain (activities that relieve):85778}    Patient's goals: ***    Objective     ***      CMS Impairment/Limitation/Restriction for FOTO *** Survey    Therapist reviewed FOTO scores for Kenyon Hunt on 7/13/2022.   FOTO documents entered into [employer] - see Media section.    Limitation Score: ***%         TREATMENT   Treatment Time In: ***  Treatment Time Out: ***  Total Treatment time separate from Evaluation: *** minutes    Kenyon received therapeutic exercises to develop {AMB PT PROGRESS OBJECTIVE:70579} for *** minutes including:  ***    Kenyon participated in neuromuscular re-education activities to improve: {AMB PT PROGRESS NEURO RE-ED:26449} for *** minutes. The following activities were included:  ***    Kenyon participated in gait training to improve functional mobility and safety for ***  minutes, including:  ***    ***     Home Exercises and Patient Education Provided    Education provided:   - ***    Written Home Exercises Provided: {Blank single:07960::"yes","Patient instructed to cont prior HEP"}.  Exercises were reviewed and Kenyon was able to demonstrate them prior to the end of the session.  Kenyon demonstrated {Desc; good/fair/poor:28222} understanding of the education provided.     See EMR under {Blank single:39385::"Media","Patient Instructions"} for exercises provided {Blank single:71402::"7/13/2022","prior visit"}.    Assessment   Kenyon is a 81 y.o. male referred to outpatient Physical Therapy with a medical diagnosis of ***. Patient presents with ***    Patient prognosis is {REHAB PROGNOSIS OHS:51283}.   Patient will benefit from skilled outpatient Physical Therapy to address the deficits stated above and in the chart below, provide " patient/family education, and to maximize patient's level of independence.     Plan of care discussed with patient: {YES:71040}  Patient's spiritual, cultural and educational needs considered and patient is agreeable to the plan of care and goals as stated below:     Anticipated Barriers for therapy: ***    Medical Necessity is demonstrated by the following  History  Co-morbidities and personal factors that may impact the plan of care Co-morbidities:   {Co-morbidities:88917}    Personal Factors:   {Personal Factors:21858}     {Desc; low/moderate/high:698264}   Examination  Body Structures and Functions, activity limitations and participation restrictions that may impact the plan of care Body Regions:   {Body Regions:80167}    Body Systems:    {Body Systems:28348}    Participation Restrictions:   ***    Activity limitations:   Learning and applying knowledge  {Learning and applying knowledge:79187}    General Tasks and Commands  {Gen tasks and commands:28405}    Communication  {Communication:23907}    Mobility  {Mobility:46531}    Self care  {Self Care:86327}    Domestic Life  {Domestic Life:02424}    Interactions/Relationships  {Interactions/Relationships:05927}    Life Areas  {Life Areas:16004}    Community and Social Life  {Community/Social Life:11709}         {Desc; low/moderate/high:572959}   Clinical Presentation {Clinical Presentation :43088} {Desc; low/moderate/high:836026}   Decision Making/ Complexity Score: {Desc; low/moderate/high:698200}     Goals:  Short Term Goals: *** weeks   ***    Long Term Goals: *** weeks   ***    Plan   Plan of care Certification: 7/13/2022 to ***.    Outpatient Physical Therapy {NUMBERS 1-5:59635} times weekly for {NUMBER 1-16:50135} weeks to include the following interventions: {TX PLAN:07543}.     BALDEMAR SESAY, PT

## 2022-07-15 ENCOUNTER — CLINICAL SUPPORT (OUTPATIENT)
Dept: REHABILITATION | Facility: HOSPITAL | Age: 82
End: 2022-07-15
Payer: MEDICARE

## 2022-07-15 DIAGNOSIS — R26.81 GAIT INSTABILITY: ICD-10-CM

## 2022-07-15 DIAGNOSIS — R26.81 UNSTEADY GAIT: ICD-10-CM

## 2022-07-15 DIAGNOSIS — H81.13 BPPV (BENIGN PAROXYSMAL POSITIONAL VERTIGO), BILATERAL: Primary | ICD-10-CM

## 2022-07-15 DIAGNOSIS — R42 DIZZINESS: ICD-10-CM

## 2022-07-15 PROCEDURE — 97112 NEUROMUSCULAR REEDUCATION: CPT | Mod: PN

## 2022-07-15 NOTE — PROGRESS NOTES
"[facility]   Physical Therapy Treatment Note     Name: Kenyon Hunt  Clinic Number: [id number]    Therapy Diagnosis:   Encounter Diagnoses   Name Primary?    BPPV (benign paroxysmal positional vertigo), bilateral Yes    Gait instability     Unsteady gait     Dizziness      Physician: Randal Chambers MD    Visit Date: 7/15/2022    Physician Orders: PT Eval and Treat   Medical Diagnosis from Referral:   Diagnosis   R26.81 (ICD-10-CM) - Gait instability   Also patient has recent orders of dizziness  Evaluation Date: 6/6/2022  Authorization Period Expiration: 11/29/2022  Plan of Care Expiration: 7/15/2022  Visit # / Visits authorized: 10/16  FOTO issued 6/22/2022    PTA Visit #: 0/5     Time In: 10:35 AM  Time Out: 11:27 AM   Total Billable Time: 52 minutes total but 14 minutes of 1:1 time (1NMR)    SUBJECTIVE     Patient reports: that dizziness is still occurring when he sits up or lies down. He states that he feels like a "tight band" is surrounding his head, but denies having a headache. Pt reports that he practices short distance ambulation without RW throughout the home.   Daughter-in-law is present and mentions concerns regarding the future of therapy and patient's consistent use of RW.    He is attempting to be compliant with safety recommendations  Response to previous treatment: no adverse response   Functional change: no falls but continued excessive posterior lean     Pain: 0/10  Location: N/A     OBJECTIVE     Dixie Hallpike (posterior / CL anterior)   Right : negative for up-beating/torsional nystagmus; down beating present as expected    Left: not tested   Treatment Performed: None    Treatment     Kenyon received the treatments listed below:      neuromuscular re-education activities to improve: Balance, Posture and stability for 42 minutes. The following activities were included:  -- see objective section for [place] Hallpike   -- Nu Step x 6 minutes x level 3 (Double " "hills setting)   --step up to 4" soft step with BUE support with verbal cueing to encourage forward weight shift  -- overground walking 150' x 1  with 3 wheeled walker with CGA and cuing for upright posture, increased step length and keeping feet within walker  -- beep board with emphasis on forward weight shifts 10" hold x 10     Patient performed functional therapeutic activities x 10 min consisting of chair --transfers with rollator x 20 total transfers with 2 arm chairs (emphasize on locking prior to sit and stand and emphasis on squaring up to chair and sitting controlled).     Not performed:  -- standing feet apart on floor  3 x 60 secs chest pass with lightweight ball  -- standing feet apart on floor  3 x 60 secs PB bounce passes   -- standing feet apart on airex   3 x 30'' EO  -- side stepping with single upper extremity support 10' x 6  -- retro walking with single upper extremity support 10' x 8 with cuing for base of support and weight shifting.   -- stand pivot transfers from mat to chair with arms x 15     Home Exercises Provided and Patient Education Provided     Education provided:   -- lack of safety and consistency with safety that would impact safe driving  -- Reinforcement of fall/safety education initiated at evaluation  - need to lock brakes with standing  - need to walk with rollator at all times  -  Need to perform the turn, square up, lock and sit method for safe transfers with AD  - need to stand and get initial standing balance for at least 5 seconds prior to standing  - need for consistent movement methods to increase likelyhood of safety in movement  - need to use rollator and not cart in the store with family   - education on lack of motor planning skills and balance     Written Home Exercises Provided: yes.  Exercises were reviewed and Kenyon was able to demonstrate them prior to the end of the session.  Kenyon demonstrated good  understanding of the education provided.      See EMR under " Patient Instructions for exercises provided 6/6/2022.    ASSESSMENT     Kenyon arrived to clinic with RW and continued complaints of dizziness. After completion of Nu Step, SPT performed Dixie Hallpike to assess for recurring up-beating/ torsional nystagmus. Dixie Hallpike was negative but down beating is present. Due to pt reporting subjective feelings of dizziness during transitional movements, pt was advised to monitor other factors that could lead to dizziness such as medication side effects or vascular-related conditions and to contact PCP for further questions. Pt continues to demonstrate difficulty performing sit to stands with adequate weight bearing on the forefoot. Introduced beep board on today which required pt to maintain weight on the toes for at least 10 seconds before returning to neutral. PT discussed with pt and daughter-in-law to begin considering future plans for therapy. With upcoming POC expiration, the pt and daughter-in-law were encouraged to discuss goals for therapy and if they would like to continue with an extended POC. Pt and daughter-in-law were also informed of the [employer] Beep Board that they could purchase for home use outside of formal treatment.      Kenyon Is progressing well towards his goals.   Pt prognosis is Good.     Pt will continue to benefit from skilled outpatient physical therapy to address the deficits listed in the problem list box on initial evaluation, provide pt/family education and to maximize pt's level of independence in the home and community environment.     Pt's spiritual, cultural and educational needs considered and pt agreeable to plan of care and goals.     Anticipated barriers to physical therapy: age, h/o falls, prior CVA (per daughter's report) that has also impacted eye movements and mobility    Short term Goals:     1. Pt will be negative for B dixie hallpike testing NOTE: patient has down beating nystagmus due to central issue and h/o cerebellar  CVA).  MET  2. Pt will have No complaint of positional vertigo for 2 weeks with daily activity for improved function.  (PROGRESSING, NOT MET)  3. Pt will have 10% decrease in limitation on the vertigo FOTO survey for improved function. (PROGRESSING, NOT MET)  4. Patient will be able to stand with his feet together on a solid surface with eyes open for 30 seconds without LOB. (PROGRESSING, NOT MET)     Long Term Goals (8 Weeks):   1.  Independent with initial HEP. (PROGRESSING, NOT MET)  2. Pt will be able to perform TUG in 20 secs with use of AD demonstrating overall improved functional mobility.  (PROGRESSING, NOT MET)  3.  Pt will score greater than or equal to 28/56 on the Salinas test/assessment without use of AD demonstrating overall improved functional mobility. (PROGRESSING, NOT MET)  5. Pt will have less than 3 falls from start of PT sessions. (PROGRESSING, NOT MET)  6. Independent with updated HEP.  (PROGRESSING, NOT MET)  7. Pt will perform floor to stand f/b stand to floor transfer B UE support with stand by assist and no cues for improved dynamic transfer, core stability and fall safety in home and community. (PROGRESSING, NOT MET)  8. Pt will begin some form of community fitness to begin regular and consistent performance of exercise to continue maintenance of gains made in PT. (PROGRESSING, NOT MET)    PLAN     Continue with treatment per POC and as tolerated. Reassess short and long term goals. Update POC versus d/c next visit.     Violet Lopez, SPT

## 2022-07-18 ENCOUNTER — CLINICAL SUPPORT (OUTPATIENT)
Dept: REHABILITATION | Facility: HOSPITAL | Age: 82
End: 2022-07-18
Payer: MEDICARE

## 2022-07-18 DIAGNOSIS — H81.13 BPPV (BENIGN PAROXYSMAL POSITIONAL VERTIGO), BILATERAL: Primary | ICD-10-CM

## 2022-07-18 DIAGNOSIS — R26.81 UNSTEADY GAIT: ICD-10-CM

## 2022-07-18 DIAGNOSIS — R26.81 GAIT INSTABILITY: ICD-10-CM

## 2022-07-18 DIAGNOSIS — R42 DIZZINESS: ICD-10-CM

## 2022-07-18 PROCEDURE — 97112 NEUROMUSCULAR REEDUCATION: CPT | Mod: PN

## 2022-07-18 NOTE — PLAN OF CARE
Outpatient Therapy Updated Plan of Care     Visit Date: 7/18/2022  Name: Kenyon Hunt  Clinic Number: [id number]    Therapy Diagnosis:   Encounter Diagnoses   Name Primary?    BPPV (benign paroxysmal positional vertigo), bilateral Yes    Gait instability     Unsteady gait     Dizziness      Physician: Randal Chambers MD    Physician Orders: PT Eval and Treat   Medical Diagnosis from Referral:   Diagnosis   R26.81 (ICD-10-CM) - Gait instability   Also patient has recent orders of dizziness  Evaluation Date: 6/6/2022      Total Visits Received: 12  Cancelled Visits: 0  No Show Visits: 0    Current Certification Period:  07/18/22 to 08/12/22  Precautions:  Fall, standard  Visits from Evaluation Date:  11  Functional Level Prior to Evaluation:  Modified independence     Subjective     Update: Patient reports: that is continuing to practice correct technique for safe sit to stands. He states that he has plans to purchase a Beep Board for home use to encourage equal weight shift on the forefoot. Pt's daughter-in-law is present and she is agreeable to extending therapy for another three weeks. She states that she has concerns with patient ambulating throughout the home without RW. Pt's daughter-in-law also reports that the patient is requesting clearance to return to driving, but the family is not agreeable with this decision.     Objective     Update: TUG = >30 seconds   Romberg (feet together on stable surface eyes open) >30 seconds minimal sway    Assessment     Update: Kenyon arrived to clinic with RW and daughter-in-law present. They've both agreed that an extra three weeks to work on functional balance tasks and ambulation with RW is appropriate. After speaking with daughter-in-law regarding concerns about pt's likelihood of falls when he ambulates throughout the home without RW, PT encouraged the daughter-in-law to continue reinforcing everything he has learned in therapy. PT also advised pt to never  ambulate without RW present and to ask for assistance before trying to  small objects from the floor to prevent symptoms similar to an orthostatic response. While pt retrieved cones from the floor, those symptoms of dizziness related to transitions returned and pt requested a seated rest break. Pt was reassessed on the TUG and he continues to require more than 30 seconds and multiple trials to complete assessment.However, pt is continuing to demonstrate improved sit to stand technique and requires minimal to no verbal cueing. He is also able to pinpoint mistakes if and when they happen.    Previous Short Term Goals Status:    1. Pt will be negative for B leslie hallpike testing NOTE: patient has down beating nystagmus due to central issue and h/o cerebellar CVA).  MET  2. Pt will have No complaint of positional vertigo for 2 weeks with daily activity for improved function.  (PROGRESSING, NOT MET)  3. Pt will have 10% decrease in limitation on the vertigo FOTO survey for improved function. (PROGRESSING, NOT MET)  4. Patient will be able to stand with his feet together on a solid surface with eyes open for 30 seconds without LOB. ( MET)     New Short Term Goals Status:       Patient will be able to stand with is feet together on a solid surface with eyes closed for 30 seconds without LOB (Progressing, Not Met)     Long Term Goal Status:   continue per initial plan of care.  Long Term Goals (8 Weeks):   1.  Independent with initial HEP. (PROGRESSING, NOT MET)  2. Pt will be able to perform TUG in 20 secs with use of AD demonstrating overall improved functional mobility.  (PROGRESSING, NOT MET)  3.  Pt will score greater than or equal to 28/56 on the [place] test/assessment without use of AD demonstrating overall improved functional mobility. (PROGRESSING, NOT MET)  5. Pt will have less than 3 falls from start of PT sessions. (PROGRESSING, NOT MET)  6. Independent with updated HEP.  (PROGRESSING, NOT MET)  7. Pt will  perform floor to stand f/b stand to floor transfer B UE support with stand by assist and no cues for improved dynamic transfer, core stability and fall safety in home and community. (PROGRESSING, NOT MET)  8. Pt will begin some form of community fitness to begin regular and consistent performance of exercise to continue maintenance of gains made in PT. (PROGRESSING, NOT MET)    Reasons for Recertification of Therapy:   Current POC expiration 07/15/2022     Plan     Updated Certification Period: 7/18/2022 to 8/12/22  Recommended Treatment Plan: 1-2 times per week for 3 weeks: Gait Training, Neuromuscular Re-ed, Patient Education, Self Care, Therapeutic Activities and Therapeutic Exercise  Other Recommendations: continue with functional balance training     Violet Lopez, SPT  7/18/2022      I CERTIFY THE NEED FOR THESE SERVICES FURNISHED UNDER THIS PLAN OF TREATMENT AND WHILE UNDER MY CARE    Physician's comments:        Physician's Signature: ___________________________________________________

## 2022-07-18 NOTE — PROGRESS NOTES
[facility]   Physical Therapy Treatment Note     Name: Kenyon Mccarthy [facility]  Clinic Number: [id number]    Therapy Diagnosis:   Encounter Diagnoses   Name Primary?    BPPV (benign paroxysmal positional vertigo), bilateral Yes    Gait instability     Unsteady gait     Dizziness      Physician: Randal Chambers MD    Visit Date: 7/18/2022    Physician Orders: PT Eval and Treat   Medical Diagnosis from Referral:   Diagnosis   R26.81 (ICD-10-CM) - Gait instability   Also patient has recent orders of dizziness  Evaluation Date: 6/6/2022  Authorization Period Expiration: 11/29/2022  Plan of Care Expiration: 8/12/22  Visit # / Visits authorized: 11/16  FOTO issued 6/22/2022    PTA Visit #: 0/5     Time In: 10:20 AM  Time Out: 11:10 AM   Total Billable Time: 50 minutes total but 14 minutes of 1:1 time (1NMR)    SUBJECTIVE     Patient reports: that is continuing to practice correct technique for safe sit to stands. He states that he has plans to purchase a Beep Board for home use to encourage equal weight shift on the forefoot. Pt's daughter-in-law is present and she is agreeable to extending therapy for another three weeks. She states that she has concerns with patient ambulating throughout the home without RW. Pt's daughter-in-law also reports that the patient is requesting clearance to return to driving, but the family is not agreeable with this decision.    He is attempting to be compliant with safety recommendations  Response to previous treatment: no adverse response   Functional change: no falls but continued excessive posterior lean     Pain: 0/10  Location: N/A     OBJECTIVE     TUG = >30 seconds   Romberg (feet together on stable surface eyes open) >30 seconds minimal sway       Treatment     Kenyon received the treatments listed below:      neuromuscular re-education activities to improve: Balance, Posture and stability for 50 minutes. The following activities were included:  --  "Recumbent Bike  x 7 minutes x level 3 (Double hills setting)   -- overground walking 150' x 1  with 3 wheeled walker with CGA and cuing for upright posture, increased step length and keeping feet within walker  -- beep board with emphasis on forward weight shifts 10" hold x 10   - cone pick ups in //bars 4 lengths x 8ft ea single UE support and CGA; (increased reports of dizziness)  -- cone avoidance with RW   --forward and lateral half foam step overs with BUE support and emphasis on equal weight shift to forefoot with each step   -- TUG (see objective section)   --     Patient performed functional therapeutic activities x 00 min consisting of chair --transfers with rollator x 20 total transfers with 2 arm chairs (emphasize on locking prior to sit and stand and emphasis on squaring up to chair and sitting controlled).     Not performed:  -- standing feet apart on floor  3 x 60 secs chest pass with lightweight ball  -- standing feet apart on floor  3 x 60 secs PB bounce passes   -- standing feet apart on airex   3 x 30'' EO  -- side stepping with single upper extremity support 10' x 6  -- retro walking with single upper extremity support 10' x 8 with cuing for base of support and weight shifting.   -- stand pivot transfers from mat to chair with arms x 15   --step up to 4" soft step with BUE support with verbal cueing to encourage forward weight shift    Home Exercises Provided and Patient Education Provided     Education provided:   -- lack of safety and consistency with safety that would impact safe driving  -- Reinforcement of fall/safety education initiated at evaluation  - need to lock brakes with standing  - need to walk with rollator at all times  -  Need to perform the turn, square up, lock and sit method for safe transfers with AD  - need to stand and get initial standing balance for at least 5 seconds prior to standing  - need for consistent movement methods to increase likelyhood of safety in movement  - " need to use rollator and not cart in the store with family   - education on lack of motor planning skills and balance     Written Home Exercises Provided: yes.  Exercises were reviewed and Kenyon was able to demonstrate them prior to the end of the session.  Kenyon demonstrated good  understanding of the education provided.      See EMR under Patient Instructions for exercises provided 6/6/2022.    ASSESSMENT     Kenyon arrived to clinic with RW and daughter-in-law present. They've both agreed that an extra three weeks to work on functional balance tasks and ambulation with RW is appropriate. After speaking with daughter-in-law regarding concerns about pt's likelihood of falls when he ambulates throughout the home without RW, PT encouraged the daughter-in-law to continue reinforcing everything he has learned in therapy. PT also advised pt to never ambulate without RW present and to ask for assistance before trying to  small objects from the floor to prevent symptoms similar to an orthostatic response. While pt retrieved cones from the floor, those symptoms of dizziness related to transitions returned and pt requested a seated rest break. Pt was reassessed on the TUG and he continues to require more than 30 seconds and multiple trials to complete assessment.However, pt is continuing to demonstrate improved sit to stand technique and requires minimal to no verbal cueing. He is also able to pinpoint mistakes if and when they happen.      Kenyon Is progressing well towards his goals.   Pt prognosis is Good.     Pt will continue to benefit from skilled outpatient physical therapy to address the deficits listed in the problem list box on initial evaluation, provide pt/family education and to maximize pt's level of independence in the home and community environment.     Pt's spiritual, cultural and educational needs considered and pt agreeable to plan of care and goals.     Anticipated barriers to physical therapy: age,  h/o falls, prior CVA (per daughter's report) that has also impacted eye movements and mobility    Short term Goals:     1. 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  MET  2. Pt will have No complaint of positional vertigo for 2 weeks with daily activity for improved function.  (PROGRESSING, NOT MET)  3. Pt will have 10% decrease in limitation on the vertigo FOTO survey for improved function. (PROGRESSING, NOT MET)  4. Patient will be able to stand with his feet together on a solid surface with eyes open for 30 seconds without LOB. (PROGRESSING, NOT MET)    NEW STG:  Patient will be able to stand with is feet together on a solid surface with eyes closed for 30 seconds without LOB (Progressing, Not Met)      Long Term Goals (8 Weeks):   1.  Independent with initial HEP. (PROGRESSING, NOT MET)  2. Pt will be able to perform TUG in 20 secs with use of AD demonstrating overall improved functional mobility.  (PROGRESSING, NOT MET)  3.  Pt will score greater than or equal to 28/56 on the Salinas test/assessment without use of AD demonstrating overall improved functional mobility. (PROGRESSING, NOT MET)  5. Pt will have less than 3 falls from start of PT sessions. (PROGRESSING, NOT MET)  6. Independent with updated HEP.  (PROGRESSING, NOT MET)  7. Pt will perform floor to stand f/b stand to floor transfer B UE support with stand by assist and no cues for improved dynamic transfer, core stability and fall safety in home and community. (PROGRESSING, NOT MET)  8. Pt will begin some form of community fitness to begin regular and consistent performance of exercise to continue maintenance of gains made in PT. (PROGRESSING, NOT MET)    PLAN     Continue with treatment per POC and as tolerated. Reassess short and long term goals.    Violet Lopez, SPT

## 2022-07-26 ENCOUNTER — CLINICAL SUPPORT (OUTPATIENT)
Dept: REHABILITATION | Facility: HOSPITAL | Age: 82
End: 2022-07-26
Payer: MEDICARE

## 2022-07-26 DIAGNOSIS — R26.81 GAIT INSTABILITY: Primary | ICD-10-CM

## 2022-07-26 PROCEDURE — 97112 NEUROMUSCULAR REEDUCATION: CPT | Mod: PN

## 2022-07-26 NOTE — PROGRESS NOTES
[facility]   Physical Therapy Treatment Note / Discharge Summary    [place]cc[place] [facility]  Clinic Number: [id number]    Therapy Diagnosis:   Encounter Diagnosis   Name Primary?    Gait instability Yes     Physician: Randal Chambers MD    Visit Date: 7/26/2022    Physician Orders: PT Eval and Treat   Medical Diagnosis from Referral:   Diagnosis   R26.81 (ICD-10-CM) - Gait instability   Also patient has recent orders of dizziness  Evaluation Date: 6/6/2022  Authorization Period Expiration: 11/29/2022  Plan of Care Expiration: 8/12/22  Visit # / Visits authorized: 12/16  FOTO issued 7/26/2022    PTA Visit #: 0/5     Time In: 4:20  PM  Time Out: 5:05 PM   Total Billable Time: 45 (3 NMR)    SUBJECTIVE     Patient reports: One fall last week, lost balance backwards.  Pt, son and daughter in law in agreement with today being last day of therapy.     He is attempting to be compliant with safety recommendations  Response to previous treatment: no adverse response   Functional change: no falls but continued excessive posterior lean     Pain: 0/10  Location: N/A     OBJECTIVE     TUG = >30 seconds   Romberg (feet together on stable surface eyes open) >30 seconds minimal sway     PITTS  BALANCE ASSESSMENT TOOL  1. Sitting to Standing   2 - able to stand using hands after several tries  2. Standing Unsupported   3 - able to stand 2 minutes with supervision  3. Sitting Unsupported   4 - able to sit safely and securely 2 minutes  4. Standing to Sitting   2 - uses back of legs against chair to control descent  5. Pivot Transfer   3 - able to transfer safely with definite use of hands  6. Standing with Eyes Closed   3 - able to stand 10 seconds with supervision  7. Standing with Feet Together   3 - able to place feet together independently and stand for 1 minute with supervision  8. Reaching Forward with Outstretched Arm   1 - reaches forward but needs supervision  9. Retrieving Object from Floor   1  - unable to  and needs supervision while trying  10. Turning to Look Behind   1 - needs supervision when turning  11. Turning 360 Degrees   1 - needs close supervision or verbal cueing  12. Placing Alternate Foot on Step   1 - able to complete > 2 steps needs min assist  13. Standing with One Foot in Front   0 - Looses balance while stepping or standing  14. Standing on One Foot   0 - unable to try or needs assistance to prevent fall    TOTAL SCORE: 25  Maximum: 56  Score:   0-20= high fall risk   21-40 moderate fall risk   41-56 low fall risk     Fall risk cut-off scores:   Elderly and History of falls: <51/56   Elderly and No history of falls: <42/56   CVA: <45/56             Treatment     Kenyon received the treatments listed below:      neuromuscular re-education activities to improve: Balance, Posture and stability for 50 minutes. The following activities were included:  -- Recumbent Bike  x 7 minutes x level 3 (Double hills setting)   -- overground walking 150' x 1  with 3 wheeled walker with CGA and cuing for upright posture, increased step length and keeping feet within walker  -- cone avoidance with RW   -- TUG   -- PITTS    Patient performed functional therapeutic activities x 00 min consisting of chair --transfers with rollator x 20 total transfers with 2 arm chairs (emphasize on locking prior to sit and stand and emphasis on squaring up to chair and sitting controlled).       Home Exercises Provided and Patient Education Provided     Education provided:   -- lack of safety and consistency with safety that would impact safe driving  -- Reinforcement of fall/safety education initiated at evaluation  - need to lock brakes with standing  - need to walk with rollator at all times  -  Need to perform the turn, square up, lock and sit method for safe transfers with AD  - need to stand and get initial standing balance for at least 5 seconds prior to standing  - need for consistent movement methods to  increase likelyhood of safety in movement  - need to use rollator and not cart in the store with family   - education on lack of motor planning skills and balance     Written Home Exercises Provided: yes.  Exercises were reviewed and Kenyon was able to demonstrate them prior to the end of the session.  Kenyon demonstrated good  understanding of the education provided.      See EMR under Patient Instructions for exercises provided 6/6/2022.    ASSESSMENT     Kenyon arrived to clinic with RW and daughter-in-law and son present. Pt demonstrates improvement in static and dynamic balance in Salinas assessment since starting therapy, but patient continues to be a fall risk. PT reinforced recommendation to never ambulate without RW present and to ask for assistance before trying to  small objects from the floor to prevent symptoms similar to an orthostatic response. Pt's sit to stand and stand pivot transfers have improved and patient requires mimal cuing to perform safely. Pt has met max functional potential and is in agreement with plan to discharge to home exercise program.      Kenyon Is progressing well towards his goals.   Pt prognosis is Good.     Pt's spiritual, cultural and educational needs considered and pt agreeable to plan of care and goals.     Anticipated barriers to physical therapy: age, h/o falls, prior CVA (per daughter's report) that has also impacted eye movements and mobility    Short term Goals:     1. 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  MET  2. Pt will have No complaint of positional vertigo for 2 weeks with daily activity for improved function.  MET  3. Pt will have 10% decrease in limitation on the vertigo FOTO survey for improved function.  NOT MET  4. Patient will be able to stand with his feet together on a solid surface with eyes open for 30 seconds without LOB.  MET       Long Term Goals (8  Weeks):   1.  Independent with initial HEP.  MET  2. Pt will be able to perform TUG in 20 secs with use of AD demonstrating overall improved functional mobility.  NOT MET  3.  Pt will score greater than or equal to 28/56 on the Salinas test/assessment without use of AD demonstrating overall improved functional mobility. NOT MET  5. Pt will have less than 3 falls from start of PT sessions.  MET (3 falls)  6. Independent with updated HEP.   MET  7. Pt will perform floor to stand f/b stand to floor transfer B UE support with stand by assist and no cues for improved dynamic transfer, core stability and fall safety in home and community. NOT MET  8. Pt will begin some form of community fitness to begin regular and consistent performance of exercise to continue maintenance of gains made in PT.  MET    PLAN     Discharge to [facility].    Priscilla Cornejo, PT

## 2022-08-16 NOTE — TELEPHONE ENCOUNTER
No new care gaps identified.  [facility] Embedded Care Gaps. Reference number: [id number]. 8/16/2022   12:53:25 PM CDT

## 2022-08-16 NOTE — TELEPHONE ENCOUNTER
----- Message from Awilda Mcclelland sent at 8/16/2022  1:26 PM CDT -----  Contact: self/[phone]  Pt called in regards to getting lab orders put into the system so he can get it done before the appointment    Please advice

## 2022-08-16 NOTE — TELEPHONE ENCOUNTER
----- Message from Awilda Mcclelland sent at 8/16/2022  1:22 PM CDT -----  Contact: self/[phone]  Patient is returning a phone call.  Who left a message for the patient: lpn  Does patient know what this is regarding:    Would you like a call back, or a response through your [facility] portal?:  call   Comments:      Please advice

## 2022-08-17 RX ORDER — LORAZEPAM 0.5 MG/1
TABLET ORAL
Qty: 90 TABLET | Refills: 5 | Status: SHIPPED | OUTPATIENT
Start: 2022-08-17 | End: 2023-02-14

## 2022-08-22 ENCOUNTER — TELEPHONE (OUTPATIENT)
Dept: INTERNAL MEDICINE | Facility: CLINIC | Age: 82
End: 2022-08-22
Payer: MEDICARE

## 2022-08-22 NOTE — TELEPHONE ENCOUNTER
----- Message from Sue Christian sent at 8/22/2022 11:01 AM CDT -----  Needs advice from nurse:      Who Called:Ayala HANCOCK  Regarding:needs you to complete medical form that was faxed this morning for patient's transit  Would the patient rather a call back or VIA [facility]?  Best Call Back number: fax   Additional Info:

## 2022-08-30 ENCOUNTER — OFFICE VISIT (OUTPATIENT)
Dept: NEUROLOGY | Facility: CLINIC | Age: 82
End: 2022-08-30
Payer: MEDICARE

## 2022-08-30 VITALS
WEIGHT: 166 LBS | SYSTOLIC BLOOD PRESSURE: 148 MMHG | HEART RATE: 62 BPM | BODY MASS INDEX: 27.66 KG/M2 | HEIGHT: 65 IN | DIASTOLIC BLOOD PRESSURE: 74 MMHG

## 2022-08-30 DIAGNOSIS — R26.81 GAIT INSTABILITY: Primary | ICD-10-CM

## 2022-08-30 DIAGNOSIS — H81.10 BENIGN PAROXYSMAL POSITIONAL VERTIGO, UNSPECIFIED LATERALITY: ICD-10-CM

## 2022-08-30 PROCEDURE — 1159F PR MEDICATION LIST DOCUMENTED IN MEDICAL RECORD: ICD-10-PCS | Mod: CPTII,GC,S$GLB, | Performed by: PSYCHIATRY & NEUROLOGY

## 2022-08-30 PROCEDURE — 1160F PR REVIEW ALL MEDS BY PRESCRIBER/CLIN PHARMACIST DOCUMENTED: ICD-10-PCS | Mod: CPTII,GC,S$GLB, | Performed by: PSYCHIATRY & NEUROLOGY

## 2022-08-30 PROCEDURE — 3288F FALL RISK ASSESSMENT DOCD: CPT | Mod: CPTII,GC,S$GLB, | Performed by: PSYCHIATRY & NEUROLOGY

## 2022-08-30 PROCEDURE — 1126F PR PAIN SEVERITY QUANTIFIED, NO PAIN PRESENT: ICD-10-PCS | Mod: CPTII,GC,S$GLB, | Performed by: PSYCHIATRY & NEUROLOGY

## 2022-08-30 PROCEDURE — 1100F PTFALLS ASSESS-DOCD GE2>/YR: CPT | Mod: CPTII,GC,S$GLB, | Performed by: PSYCHIATRY & NEUROLOGY

## 2022-08-30 PROCEDURE — 99214 PR OFFICE/OUTPT VISIT, EST, LEVL IV, 30-39 MIN: ICD-10-PCS | Mod: GC,S$GLB,, | Performed by: PSYCHIATRY & NEUROLOGY

## 2022-08-30 PROCEDURE — 1100F PR PT FALLS ASSESS DOC 2+ FALLS/FALL W/INJURY/YR: ICD-10-PCS | Mod: CPTII,GC,S$GLB, | Performed by: PSYCHIATRY & NEUROLOGY

## 2022-08-30 PROCEDURE — 3078F PR MOST RECENT DIASTOLIC BLOOD PRESSURE < 80 MM HG: ICD-10-PCS | Mod: CPTII,GC,S$GLB, | Performed by: PSYCHIATRY & NEUROLOGY

## 2022-08-30 PROCEDURE — 3077F PR MOST RECENT SYSTOLIC BLOOD PRESSURE >= 140 MM HG: ICD-10-PCS | Mod: CPTII,GC,S$GLB, | Performed by: PSYCHIATRY & NEUROLOGY

## 2022-08-30 PROCEDURE — 1159F MED LIST DOCD IN RCRD: CPT | Mod: CPTII,GC,S$GLB, | Performed by: PSYCHIATRY & NEUROLOGY

## 2022-08-30 PROCEDURE — 3078F DIAST BP <80 MM HG: CPT | Mod: CPTII,GC,S$GLB, | Performed by: PSYCHIATRY & NEUROLOGY

## 2022-08-30 PROCEDURE — 1126F AMNT PAIN NOTED NONE PRSNT: CPT | Mod: CPTII,GC,S$GLB, | Performed by: PSYCHIATRY & NEUROLOGY

## 2022-08-30 PROCEDURE — 99999 PR PBB SHADOW E&M-EST. PATIENT-LVL IV: CPT | Mod: PBBFAC,GC,, | Performed by: STUDENT IN AN ORGANIZED HEALTH CARE EDUCATION/TRAINING PROGRAM

## 2022-08-30 PROCEDURE — 3288F PR FALLS RISK ASSESSMENT DOCUMENTED: ICD-10-PCS | Mod: CPTII,GC,S$GLB, | Performed by: PSYCHIATRY & NEUROLOGY

## 2022-08-30 PROCEDURE — 99499 RISK ADDL DX/OHS AUDIT: ICD-10-PCS | Mod: S$GLB,,, | Performed by: STUDENT IN AN ORGANIZED HEALTH CARE EDUCATION/TRAINING PROGRAM

## 2022-08-30 PROCEDURE — 99999 PR PBB SHADOW E&M-EST. PATIENT-LVL IV: ICD-10-PCS | Mod: PBBFAC,GC,, | Performed by: STUDENT IN AN ORGANIZED HEALTH CARE EDUCATION/TRAINING PROGRAM

## 2022-08-30 PROCEDURE — 99214 OFFICE O/P EST MOD 30 MIN: CPT | Mod: GC,S$GLB,, | Performed by: PSYCHIATRY & NEUROLOGY

## 2022-08-30 PROCEDURE — 99499 UNLISTED E&M SERVICE: CPT | Mod: S$GLB,,, | Performed by: STUDENT IN AN ORGANIZED HEALTH CARE EDUCATION/TRAINING PROGRAM

## 2022-08-30 PROCEDURE — 3077F SYST BP >= 140 MM HG: CPT | Mod: CPTII,GC,S$GLB, | Performed by: PSYCHIATRY & NEUROLOGY

## 2022-08-30 PROCEDURE — 1160F RVW MEDS BY RX/DR IN RCRD: CPT | Mod: CPTII,GC,S$GLB, | Performed by: PSYCHIATRY & NEUROLOGY

## 2022-08-30 RX ORDER — CARBIDOPA AND LEVODOPA 25; 100 MG/1; MG/1
1.5 TABLET ORAL 3 TIMES DAILY
Qty: 135 TABLET | Refills: 11 | Status: SHIPPED | OUTPATIENT
Start: 2022-08-30 | End: 2023-02-22

## 2022-08-30 RX ORDER — MEMANTINE HYDROCHLORIDE 5 MG/1
5 TABLET ORAL 2 TIMES DAILY
Qty: 60 TABLET | Refills: 11 | Status: SHIPPED | OUTPATIENT
Start: 2022-08-30 | End: 2023-06-08 | Stop reason: SDUPTHER

## 2022-09-01 NOTE — PROGRESS NOTES
I have seen the patient, reviewed the Resident's history and physical, assessment, and plan. I have personally interviewed and examined the patient at bedside and agree with the findings.       Iván Joseph MD  Neurology  [facility] - Neurology 7th Fl

## 2022-09-01 NOTE — PROGRESS NOTES
[facility] [place]    Date: 8/30/22  Patient Name: Kenyon Hunt   MRN: [id number]   PCP: Italia Ramírez  Referring Provider: No ref. provider found    Assessment:   Kenyon Hunt is a 81 y.o. male presenting to clinic with chronic complaints of gait instability. See HPI and interval history for full detail. 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 His labs, however, have been largely unrevealing. 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 Optho (Dr Ruiz) with recommendation of memantine which is acceptable from a neurologic perspective. Patient open to trialing the medication. The patients shuffling gait with end block turning and decrease in movement size may be secondary to enlargement of perivascular spaces in bilateral basal ganglia. As a result, the patient may benefit from a dopamine agent as well as PT. Patient had been taking his sinamet incorrectly and is no longer working with PT. He is open to increasing the dose as needed and is understanding of the fact that he needs to take the medication an hour before or hour after eating. He would certaindly benefit from continued PT and Vestibular therapy for BPPV symptoms.     Plan:     Started memantine per Dr Ruiz recommendation. Will follow up to determine if efficacious for patient  PT ordered for both vestibular therapy (Epley Maneuver) and PT for movement/strength  Patient and wife agreeable to continued sinamet trial to determine if the patient notices any benefit with the medication. Will continue  TID and can increase to 1.5 pills TID if desired outcome is not achieved. They were alerted to possible side effects of which to be aware. Theyw ere understanding that they should take the medication  "an hour before or hour after eating.  Patient to call or message with any questions or concerns or problems with the medications  He and wife voiced understanding of and agreement with this plan as outlined  Will follow up in 6 weeks    Problem List Items Addressed This Visit    None  Visit Diagnoses       Gait instability    -  Primary    Relevant Orders    Ambulatory referral/consult to Physical/Occupational Therapy    Ambulatory referral/consult to Physical/Occupational Therapy    Benign paroxysmal positional vertigo, unspecified laterality        Relevant Orders    Ambulatory referral/consult to Physical/Occupational Therapy    Ambulatory referral/consult to Physical/Occupational Therapy            Randal Chambers MD    Patient note was created using MModal Dictation.  Any errors in syntax or even information may not have been identified and edited on initial review prior to signing this note.  Subjective:   Patient seen in consultation at the request of No ref. provider found for the evaluation of gait instability. A copy of this note will be sent to the referring physician.        HPI:   Mr. Kenyon Hunt is a 81 y.o. male with prostate cancer, htn, hld, CAD (Pacemaker) who is presenting clinic with complaints of gait instability. He presented with his wife who helped provide history. They report that the patient has had a 4 year history of difficulty walking and with balance and with initiating walking. Now uses rollator. Cant walk more than five feet without holding onto something. 4 falls in last year. Some dizziness. Feels like he has a band around his head and is pressing down on his eyes. "Not as dizzy as he is off balance". No imaging or other workup in last four years.    Patient had recently seen Dr Ruiz with neuroophthalmology. Plan at that time for neuroimaging with continuation of baclofen for oscillopsia and 6 month follow up.     Interval History 5/20/22:  Patient presented to clinic for " follow up of gait instability. The patient reports that he felt as though he had side effects to the baclofen and therefore stopped the medication (prescribed by ophtho). He reports no significant changes in his symptoms since last visit. He still reports ongoing intermittent dizziness as well as shuffling gait with falls and difficulty turning. He denies any urinary incontinence or changes in cognition. MRI/A completed since last visit demonstrated moderate chronic microvascular ischemic change, with cerebral volume loss, prominent perivascular spaces throughout bilateral basal ganglia, a remote L cerebellar infarct, and no significant vessel abnormalities.     Interval history 9/1/22:  Patient presented to clinic for follow up of gait instability, dizziness, and features of parkinsonism. The patient reported that his dizziness improved significantly following completion of the Epley maneuver with PT but reports that he now has some recurrence of his symptom. He additionally noted initial improvement of his gait and coordination after starting to work with PT and while taking Sinamet initially. He is now no longer working with PT. He additionally reported compliance with Sinemet  TID but stated that he took the medications with meals most often. Patient had recently seen Dr Ruiz for visual complaints - who recommended starting Memantine if approved by neurology.     PAST MEDICAL HISTORY:  Past Medical History:   Diagnosis Date    Strabismus        PAST SURGICAL HISTORY:  Past Surgical History:   Procedure Laterality Date    CATARACT EXTRACTION W/  INTRAOCULAR LENS IMPLANT Bilateral     pace maker      SHOULDER SURGERY         CURRENT MEDS:  Current Outpatient Medications   Medication Sig Dispense Refill    ascorbic acid, vitamin C, (VITAMIN C) 1000 MG tablet Take 1,000 mg by mouth.      aspirin (ECOTRIN) 81 MG EC tablet Take 1 tablet (81 mg total) by mouth once daily. 90 tablet 3    atorvastatin (LIPITOR) 10  MG tablet Take 1 tablet (10 mg total) by mouth once daily. 90 tablet 3    buPROPion (WELLBUTRIN SR) 200 MG SR12 Take 1 tablet (200 mg total) by mouth 2 (two) times daily. 180 tablet 3    calcium carbonate (OS-FELICITY) 600 mg calcium (1,500 mg) Tab Take 600 mg by mouth 2 (two) times a day.      carbidopa-levodopa  mg (SINEMET)  mg per tablet Take 1 tablet by mouth 3 (three) times daily. 30 tablet 0    coenzyme Q10 100 mg capsule Take 100 mg by mouth.      EScitalopram oxalate (LEXAPRO) 20 MG tablet Take 1 tablet (20 mg total) by mouth once daily. 90 tablet 3    fluticasone propionate (FLONASE) 50 mcg/actuation nasal spray 1 spray (50 mcg total) by Each Nostril route once daily. 32 g 11    irbesartan (AVAPRO) 150 MG tablet Take 150 mg by mouth once daily.      LORazepam (ATIVAN) 0.5 MG tablet TAKE 1 TABLET(0.5 MG) BY MOUTH EVERY 8 HOURS AS NEEDED FOR ANXIETY 90 tablet 5    melatonin 5 mg Cap Take 1 capsule by mouth every evening.      melatonin-pyridoxine HCl, B6, 5-10 mg TbIE Take 5 mg by mouth.      metoprolol tartrate (LOPRESSOR) 25 MG tablet Take 25 mg by mouth 2 (two) times a day.      MILK THISTLE ORAL Take by mouth.      multivit with minerals/lutein (MULTIVITAMIN 50 PLUS ORAL) Take by mouth.      omega-3 fatty acids 1,000 mg Cap Take 2 g by mouth 2 (two) times a day.      pantoprazole (PROTONIX) 20 MG tablet Take 20 mg by mouth once daily.      POTASSIUM CHLORIDE ORAL Take 50 mg by mouth.      POTASSIUM ORAL Take by mouth.      tamsulosin (FLOMAX) 0.4 mg Cap Take 1 capsule (0.4 mg total) by mouth once daily. 90 capsule 3    TURMERIC ORAL Take by mouth.      vitamin D (VITAMIN D3) 1000 units Tab Take 1,000 Units by mouth 2 (two) times a day.      zinc 50 mg Tab Take by mouth.      baclofen (LIORESAL) 5 mg Tab tablet 5 mg (one tablet) in the morning and 10 mg (two tablets) at night (Patient not taking: Reported on 8/30/2022) 90 tablet 11    carbidopa-levodopa  mg (SINEMET)  mg per tablet Take  "1.5 tablets by mouth 3 (three) times daily. 135 tablet 11    ergocalciferol, vitamin D2, (VITAMIN D ORAL) Take 1,000 Int'l Units by mouth 2 (two) times a day.      memantine (NAMENDA) 5 MG Tab Take 1 tablet (5 mg total) by mouth 2 (two) times daily. 60 tablet 11     No current facility-administered medications for this visit.       ALLERGIES:  Review of patient's allergies indicates:   Allergen Reactions    Carbamazepine Rash       FAMILY HISTORY:  Family History   Problem Relation Age of Onset    Amblyopia Neg Hx     Blindness Neg Hx     Cataracts Neg Hx     Glaucoma Neg Hx     Macular degeneration Neg Hx     Retinal detachment Neg Hx     Strabismus Neg Hx        SOCIAL HISTORY:  Social History     Tobacco Use    Smoking status: Former    Smokeless tobacco: Never   Substance Use Topics    Alcohol use: Yes       Review of Systems:  12 system review of systems is negative except for the symptoms mentioned in HPI.      Objective:     Vitals:    08/30/22 1542   BP: (!) 148/74   BP Location: Left arm   Patient Position: Sitting   BP Method: Large (Automatic)   Pulse: 62   Weight: 75.3 kg (166 lb 0.1 oz)   Height: 5' 5" (1.651 m)     General: NAD, well nourished   Eyes: no tearing, discharge, no erythema   ENT: moist mucous membranes of the oral cavity, nares patent    Neck: Supple, full range of motion  Cardiovascular: Warm and well perfused, pulses equal and symmetrical  Lungs: Normal work of breathing, normal chest wall excursions  Skin: No rash, lesions, or breakdown on exposed skin  Psychiatry: Mood and affect are appropriate   Abdomen: soft, non tender, non distended  Extremeties: No cyanosis, clubbing or edema.  Difficulty walking and with sensation of feet  + Falls without head trauma  +Dizziness    Neurological   MENTAL STATUS: Alert and oriented to person, place, and time. Attention and concentration within normal limits. Speech without dysarthria, able to name and repeat without difficulty. Recent and remote " memory within normal limits   CRANIAL NERVES: Visual fields intact. PERRL. EOMI. Facial sensation intact. Face symmetrical. Hearing grossly intact. Full shoulder shrug bilaterally. Tongue protrudes midline   SENSORY: Diminished sensation to light touch, vibration, temperature in bilateral LE below ankle. Proprioreception intact to movement of toe. Postiive romberg.   MOTOR: Normal bulk and tone. No pronator drift.  5/5 deltoid, biceps, triceps, interosseous, hand  bilaterally. 4+/5 iliopsoas, knee extension/flexion bilaterally but mildly weaker on left. 4-/5 plantar and dorsiflexion on L. 4-/5 plantar flexion on left. 1/5 dorsiflexion on left.   REFLEXES: Symmetric and 2+ throughout. Toes down going bilaterally.   CEREBELLAR/COORDINATION/GAIT: Shuffling gait with end block turning. Foot drop while ambulating. Rapid movement, fine motor, and coordination wnl but wth some decrease in movements  No rigidity or tremor noted on exam  Decrement on repeated motions while using hand (L>R)    Imaging:    MRI/A completed since last visit demonstrated moderate chronic microvascular ischemic change, with cerebral volume loss, prominent perivascular spaces throughout bilateral basal ganglia, a remote L cerebellar infarct, and no significant vessel abnormalities.

## 2022-09-07 ENCOUNTER — TELEPHONE (OUTPATIENT)
Dept: INTERNAL MEDICINE | Facility: CLINIC | Age: 82
End: 2022-09-07
Payer: MEDICARE

## 2022-09-07 DIAGNOSIS — E78.2 MIXED HYPERLIPIDEMIA: Primary | ICD-10-CM

## 2022-09-07 DIAGNOSIS — R00.1 SYMPTOMATIC BRADYCARDIA: ICD-10-CM

## 2022-09-07 NOTE — TELEPHONE ENCOUNTER
----- Message from Moises Lee sent at 9/7/2022  9:20 AM CDT -----  Contact: [phone]  Pt is at [facility] right now to have labs drawn and [facility] is telling them they do not have any orders. Can you please send lab orders over to [facility]? Pt is scheduled for annual 9/12/22. Pt is waiting at lab and would like a call when completed.   Pt keeps calling.

## 2022-09-07 NOTE — TELEPHONE ENCOUNTER
----- Message from Joy Calvo sent at 9/7/2022  9:00 AM CDT -----  Contact: [phone]  Pt is at [facility] right now to have labs drawn and [facility] is telling them they do not have any orders. Can you please send lab orders over to [facility]? Pt is scheduled for annual 9/12/22. Pt is waiting at lab and would like a call when completed.

## 2022-09-10 LAB
ALBUMIN SERPL-MCNC: 4 G/DL (ref 3.6–5.1)
ALBUMIN/GLOB SERPL: 1.5 (CALC) (ref 1–2.5)
ALP SERPL-CCNC: 50 U/L (ref 35–144)
ALT SERPL-CCNC: 7 U/L (ref 9–46)
AST SERPL-CCNC: 16 U/L (ref 10–35)
BASOPHILS # BLD AUTO: 57 CELLS/UL (ref 0–200)
BASOPHILS NFR BLD AUTO: 0.7 %
BILIRUB SERPL-MCNC: 0.4 MG/DL (ref 0.2–1.2)
BUN SERPL-MCNC: 20 MG/DL (ref 7–25)
BUN/CREAT SERPL: ABNORMAL (CALC) (ref 6–22)
CALCIUM SERPL-MCNC: 9.7 MG/DL (ref 8.6–10.3)
CHLORIDE SERPL-SCNC: 105 MMOL/L (ref 98–110)
CHOLEST SERPL-MCNC: 140 MG/DL
CHOLEST/HDLC SERPL: 2.4 (CALC)
CO2 SERPL-SCNC: 31 MMOL/L (ref 20–32)
CREAT SERPL-MCNC: 1.04 MG/DL (ref 0.7–1.22)
EGFR: 71 ML/MIN/1.73M2
EOSINOPHIL # BLD AUTO: 543 CELLS/UL (ref 15–500)
EOSINOPHIL NFR BLD AUTO: 6.7 %
ERYTHROCYTE [DISTWIDTH] IN BLOOD BY AUTOMATED COUNT: 13.2 % (ref 11–15)
GLOBULIN SER CALC-MCNC: 2.6 G/DL (CALC) (ref 1.9–3.7)
GLUCOSE SERPL-MCNC: 93 MG/DL (ref 65–99)
HCT VFR BLD AUTO: 42.1 % (ref 38.5–50)
HDLC SERPL-MCNC: 58 MG/DL
HGB BLD-MCNC: 14.1 G/DL (ref 13.2–17.1)
LDLC SERPL CALC-MCNC: 64 MG/DL (CALC)
LYMPHOCYTES # BLD AUTO: 1596 CELLS/UL (ref 850–3900)
LYMPHOCYTES NFR BLD AUTO: 19.7 %
MCH RBC QN AUTO: 30.9 PG (ref 27–33)
MCHC RBC AUTO-ENTMCNC: 33.5 G/DL (ref 32–36)
MCV RBC AUTO: 92.3 FL (ref 80–100)
MONOCYTES # BLD AUTO: 551 CELLS/UL (ref 200–950)
MONOCYTES NFR BLD AUTO: 6.8 %
NEUTROPHILS # BLD AUTO: 5354 CELLS/UL (ref 1500–7800)
NEUTROPHILS NFR BLD AUTO: 66.1 %
NONHDLC SERPL-MCNC: 82 MG/DL (CALC)
PLATELET # BLD AUTO: 216 THOUSAND/UL (ref 140–400)
PMV BLD REES-ECKER: 10.2 FL (ref 7.5–12.5)
POTASSIUM SERPL-SCNC: 5 MMOL/L (ref 3.5–5.3)
PROT SERPL-MCNC: 6.6 G/DL (ref 6.1–8.1)
RBC # BLD AUTO: 4.56 MILLION/UL (ref 4.2–5.8)
SODIUM SERPL-SCNC: 142 MMOL/L (ref 135–146)
T4 FREE SERPL-MCNC: 1.1 NG/DL (ref 0.8–1.8)
TRIGL SERPL-MCNC: 92 MG/DL
TSH SERPL-ACNC: 3.46 MIU/L (ref 0.4–4.5)
WBC # BLD AUTO: 8.1 THOUSAND/UL (ref 3.8–10.8)

## 2022-09-12 ENCOUNTER — OFFICE VISIT (OUTPATIENT)
Dept: INTERNAL MEDICINE | Facility: CLINIC | Age: 82
End: 2022-09-12
Payer: MEDICARE

## 2022-09-12 VITALS
SYSTOLIC BLOOD PRESSURE: 130 MMHG | HEIGHT: 65 IN | BODY MASS INDEX: 27.82 KG/M2 | DIASTOLIC BLOOD PRESSURE: 76 MMHG | OXYGEN SATURATION: 94 % | WEIGHT: 167 LBS | HEART RATE: 89 BPM | TEMPERATURE: 99 F

## 2022-09-12 DIAGNOSIS — Z85.46 HISTORY OF PROSTATE CANCER: ICD-10-CM

## 2022-09-12 DIAGNOSIS — G89.29 CHRONIC BILATERAL LOW BACK PAIN WITHOUT SCIATICA: ICD-10-CM

## 2022-09-12 DIAGNOSIS — M54.50 CHRONIC BILATERAL LOW BACK PAIN WITHOUT SCIATICA: ICD-10-CM

## 2022-09-12 DIAGNOSIS — K21.9 GASTROESOPHAGEAL REFLUX DISEASE WITHOUT ESOPHAGITIS: ICD-10-CM

## 2022-09-12 DIAGNOSIS — I10 ESSENTIAL HYPERTENSION: ICD-10-CM

## 2022-09-12 DIAGNOSIS — R26.81 UNSTEADY GAIT: ICD-10-CM

## 2022-09-12 DIAGNOSIS — E78.2 MIXED HYPERLIPIDEMIA: ICD-10-CM

## 2022-09-12 DIAGNOSIS — Z86.73 HISTORY OF CVA (CEREBROVASCULAR ACCIDENT): ICD-10-CM

## 2022-09-12 DIAGNOSIS — F41.9 ANXIETY: ICD-10-CM

## 2022-09-12 DIAGNOSIS — I25.10 ARTERIOSCLEROSIS OF CORONARY ARTERY: ICD-10-CM

## 2022-09-12 PROCEDURE — 99214 PR OFFICE/OUTPT VISIT, EST, LEVL IV, 30-39 MIN: ICD-10-PCS | Mod: S$GLB,,, | Performed by: INTERNAL MEDICINE

## 2022-09-12 PROCEDURE — 3078F DIAST BP <80 MM HG: CPT | Mod: CPTII,S$GLB,, | Performed by: INTERNAL MEDICINE

## 2022-09-12 PROCEDURE — 1159F PR MEDICATION LIST DOCUMENTED IN MEDICAL RECORD: ICD-10-PCS | Mod: CPTII,S$GLB,, | Performed by: INTERNAL MEDICINE

## 2022-09-12 PROCEDURE — 99999 PR PBB SHADOW E&M-EST. PATIENT-LVL V: ICD-10-PCS | Mod: PBBFAC,,, | Performed by: INTERNAL MEDICINE

## 2022-09-12 PROCEDURE — 99214 OFFICE O/P EST MOD 30 MIN: CPT | Mod: S$GLB,,, | Performed by: INTERNAL MEDICINE

## 2022-09-12 PROCEDURE — 3075F PR MOST RECENT SYSTOLIC BLOOD PRESS GE 130-139MM HG: ICD-10-PCS | Mod: CPTII,S$GLB,, | Performed by: INTERNAL MEDICINE

## 2022-09-12 PROCEDURE — 99999 PR PBB SHADOW E&M-EST. PATIENT-LVL V: CPT | Mod: PBBFAC,,, | Performed by: INTERNAL MEDICINE

## 2022-09-12 PROCEDURE — 1126F AMNT PAIN NOTED NONE PRSNT: CPT | Mod: CPTII,S$GLB,, | Performed by: INTERNAL MEDICINE

## 2022-09-12 PROCEDURE — 3075F SYST BP GE 130 - 139MM HG: CPT | Mod: CPTII,S$GLB,, | Performed by: INTERNAL MEDICINE

## 2022-09-12 PROCEDURE — 1126F PR PAIN SEVERITY QUANTIFIED, NO PAIN PRESENT: ICD-10-PCS | Mod: CPTII,S$GLB,, | Performed by: INTERNAL MEDICINE

## 2022-09-12 PROCEDURE — 1159F MED LIST DOCD IN RCRD: CPT | Mod: CPTII,S$GLB,, | Performed by: INTERNAL MEDICINE

## 2022-09-12 PROCEDURE — 3078F PR MOST RECENT DIASTOLIC BLOOD PRESSURE < 80 MM HG: ICD-10-PCS | Mod: CPTII,S$GLB,, | Performed by: INTERNAL MEDICINE

## 2022-09-12 NOTE — ASSESSMENT & PLAN NOTE
Stable on lipitor, no myalgias  The ASCVD Risk score (Georgiana DK, et al., 2019) failed to calculate for the following reasons:    The 2019 ASCVD risk score is only valid for ages 40 to 79

## 2022-09-12 NOTE — PROGRESS NOTES
[facility]    Chief Complaint      Chief Complaint   Patient presents with    Follow-up       History of Present Illness      Kenyon Hunt is a 81 y.o. male who presents today for follow up of HTN.  Patient comes to appointment with spouse.  Cards: [place], Ortho: Cj    Last night, was having a hard time standing up.  Started walking with his walker and fell over to the side.  New med Memantine, he took for the first time last night.  He thinks this causes him dizziness after sitting after a long time but has been fine so far today.  Problem List Items Addressed This Visit       Essential hypertension    Current Assessment & Plan     Stable on irbesartan 150 mg daily and metoprolol 25 mg BID, no CP/SOB/HA.          Chronic bilateral back pain    Current Assessment & Plan     Last MRI L spine on 10/1/19 with Dr. Jimenez.  Had rizotomy in 1/2022, no longer having pain.         Anxiety    Current Assessment & Plan     Stable on Wellbutrin 200 mg BID, Lexapro 20 mg daily, Lorazepam PRN.  No SI/HI/panic attacks.  Anxiety has been worse since COVID 19 started.   Has panic attacks on occasion. Takes ativan most nights, occasionally takes during the day.         Arteriosclerosis of coronary artery    Current Assessment & Plan     On ASA and statin.  No CP/SOB.  Sees Dr. Lawton at [facility].           History of prostate cancer    Current Assessment & Plan     On Flomax.  No sisues at present.         Hyperlipidemia    Current Assessment & Plan     Stable on lipitor, no myalgias  The ASCVD Risk score (Georgiana CARDENAS, et al., 2019) failed to calculate for the following reasons:    The 2019 ASCVD risk score is only valid for ages 40 to 79          Gastroesophageal reflux disease without esophagitis    Current Assessment & Plan     Stable on protonix, sees Dr. Lowery.  No issues at present         History of CVA (cerebrovascular accident)    Current Assessment & Plan     After 2/2020, residual  dizziness and optho issues, on ASA and lipitor every other day.         Unsteady gait    Current Assessment & Plan     Seeing neuro, using new walker and is doing better but wife says he doesn't use all the time.            Health Maintenance   Topic Date Due    TETANUS VACCINE  09/12/2023 (Originally 9/29/1958)    Aspirin/Antiplatelet Therapy  09/12/2023    Lipid Panel  09/09/2027       Past Medical History:   Diagnosis Date    Strabismus        Past Surgical History:   Procedure Laterality Date    CATARACT EXTRACTION W/  INTRAOCULAR LENS IMPLANT Bilateral     pace maker      SHOULDER SURGERY         family history is not on file.    Social History     Tobacco Use    Smoking status: Former    Smokeless tobacco: Never   Substance Use Topics    Alcohol use: Yes       Review of Systems   Constitutional:  Negative for chills and fever.   HENT:  Negative for sore throat.    Respiratory:  Negative for cough and shortness of breath.    Cardiovascular:  Negative for chest pain and palpitations.   Gastrointestinal:  Negative for constipation, diarrhea, nausea and vomiting.   Genitourinary:  Negative for dysuria and hematuria.   Musculoskeletal:  Negative for falls.   Neurological:  Negative for headaches.      Outpatient Encounter Medications as of 9/12/2022   Medication Sig Dispense Refill    ascorbic acid, vitamin C, (VITAMIN C) 1000 MG tablet Take 1,000 mg by mouth.      aspirin (ECOTRIN) 81 MG EC tablet Take 1 tablet (81 mg total) by mouth once daily. 90 tablet 3    atorvastatin (LIPITOR) 10 MG tablet Take 1 tablet (10 mg total) by mouth once daily. 90 tablet 3    buPROPion (WELLBUTRIN SR) 200 MG SR12 Take 1 tablet (200 mg total) by mouth 2 (two) times daily. 180 tablet 3    calcium carbonate (OS-FELICITY) 600 mg calcium (1,500 mg) Tab Take 600 mg by mouth 2 (two) times a day.      carbidopa-levodopa  mg (SINEMET)  mg per tablet Take 1 tablet by mouth 3 (three) times daily. 30 tablet 0    carbidopa-levodopa   mg (SINEMET)  mg per tablet Take 1.5 tablets by mouth 3 (three) times daily. 135 tablet 11    coenzyme Q10 100 mg capsule Take 100 mg by mouth.      ergocalciferol, vitamin D2, (VITAMIN D ORAL) Take 1,000 Int'l Units by mouth 2 (two) times a day.      EScitalopram oxalate (LEXAPRO) 20 MG tablet Take 1 tablet (20 mg total) by mouth once daily. 90 tablet 3    fluticasone propionate (FLONASE) 50 mcg/actuation nasal spray 1 spray (50 mcg total) by Each Nostril route once daily. 32 g 11    irbesartan (AVAPRO) 150 MG tablet Take 150 mg by mouth once daily.      LORazepam (ATIVAN) 0.5 MG tablet TAKE 1 TABLET(0.5 MG) BY MOUTH EVERY 8 HOURS AS NEEDED FOR ANXIETY 90 tablet 5    melatonin 5 mg Cap Take 1 capsule by mouth every evening.      melatonin-pyridoxine HCl, B6, 5-10 mg TbIE Take 5 mg by mouth.      memantine (NAMENDA) 5 MG Tab Take 1 tablet (5 mg total) by mouth 2 (two) times daily. 60 tablet 11    metoprolol tartrate (LOPRESSOR) 25 MG tablet Take 25 mg by mouth 2 (two) times a day.      MILK THISTLE ORAL Take by mouth.      multivit with minerals/lutein (MULTIVITAMIN 50 PLUS ORAL) Take by mouth.      omega-3 fatty acids 1,000 mg Cap Take 2 g by mouth 2 (two) times a day.      pantoprazole (PROTONIX) 20 MG tablet Take 20 mg by mouth once daily.      POTASSIUM CHLORIDE ORAL Take 50 mg by mouth.      POTASSIUM ORAL Take by mouth.      tamsulosin (FLOMAX) 0.4 mg Cap Take 1 capsule (0.4 mg total) by mouth once daily. 90 capsule 3    TURMERIC ORAL Take by mouth.      vitamin D (VITAMIN D3) 1000 units Tab Take 1,000 Units by mouth 2 (two) times a day.      zinc 50 mg Tab Take by mouth.      [DISCONTINUED] baclofen (LIORESAL) 5 mg Tab tablet 5 mg (one tablet) in the morning and 10 mg (two tablets) at night (Patient not taking: No sig reported) 90 tablet 11     No facility-administered encounter medications on file as of 9/12/2022.        Review of patient's allergies indicates:   Allergen Reactions     "Carbamazepine Rash       Physical Exam      Vital Signs  Temp: 98.9 °F (37.2 °C)  Pulse: 89  SpO2: (!) 94 %  BP: 130/76  Pain Score: 0-No pain  Height and Weight  Height: 5' 5" (165.1 cm)  Weight: 75.8 kg (167 lb)  BSA (Calculated - sq m): 1.86 sq meters  BMI (Calculated): 27.8  Weight in (lb) to have BMI = 25: 149.9]    Physical Exam  Constitutional:       Appearance: He is well-developed.   HENT:      Head: Normocephalic and atraumatic.   Neck:      Thyroid: No thyromegaly.   Cardiovascular:      Rate and Rhythm: Normal rate and regular rhythm.      Heart sounds: No murmur heard.  Pulmonary:      Effort: Pulmonary effort is normal. No respiratory distress.      Breath sounds: Normal breath sounds.   Abdominal:      General: There is no distension.      Palpations: Abdomen is soft.      Tenderness: There is no abdominal tenderness.   Skin:     General: Skin is warm and dry.   Neurological:      Mental Status: He is alert and oriented to person, place, and time.   Psychiatric:         Behavior: Behavior normal.        Laboratory:  CBC:  Recent Labs   Lab Result Units 09/09/22  0904   WBC Thousand/uL 8.1   RBC Million/uL 4.56   Hemoglobin g/dL 14.1   Hematocrit % 42.1   Platelets Thousand/uL 216   MCV fL 92.3   MCH pg 30.9   MCHC g/dL 33.5     CMP:  Recent Labs   Lab Result Units 09/09/22  0904   Glucose mg/dL 93   Calcium mg/dL 9.7   Albumin g/dL 4.0   Total Protein g/dL 6.6   Sodium mmol/L 142   Potassium mmol/L 5.0   CO2 mmol/L 31   Chloride mmol/L 105   BUN mg/dL 20   ALT U/L 7*   AST U/L 16   Total Bilirubin mg/dL 0.4     URINALYSIS:  No results for input(s): COLORU, CLARITYU, SPECGRAV, PHUR, PROTEINUA, GLUCOSEU, BILIRUBINCON, BLOODU, WBCU, RBCU, BACTERIA, MUCUS, NITRITE, LEUKOCYTESUR, UROBILINOGEN, HYALINECASTS in the last 2160 hours.   LIPIDS:  Recent Labs   Lab Result Units 09/09/22  0904   TSH mIU/L 3.46   HDL mg/dL 58   Cholesterol mg/dL 140   Triglycerides mg/dL 92   LDL Cholesterol mg/dL (calc) 64 "   HDL/Cholesterol Ratio (calc) 2.4   Non HDL Chol. (LDL+VLDL) mg/dL (calc) 82     TSH:  Recent Labs   Lab Result Units 09/09/22  0904   TSH mIU/L 3.46     A1C:  No results for input(s): HGBA1C in the last 2160 hours.    Radiology:  No results found in the last 30 days.     Assessment/Plan     Kenyon Hunt is a 81 y.o.male with:    1. Essential hypertension    2. Mixed hyperlipidemia    3. Gastroesophageal reflux disease without esophagitis    4. Anxiety    5. History of CVA (cerebrovascular accident)    6. Unsteady gait    7. Chronic bilateral low back pain without sciatica    8. History of prostate cancer    9. Arteriosclerosis of coronary artery    -counseled on COVID booster and flu shot, declines  -counseled on increasing exercise  -Continue current medications and maintain follow up with specialists.    -Follow up in about 6 months (around 3/12/2023) for follow up of medical problems.       Italia Ramírez MD  [facility]

## 2022-09-16 ENCOUNTER — CLINICAL SUPPORT (OUTPATIENT)
Dept: REHABILITATION | Facility: HOSPITAL | Age: 82
End: 2022-09-16
Payer: MEDICARE

## 2022-09-16 DIAGNOSIS — H81.11 BPPV (BENIGN PAROXYSMAL POSITIONAL VERTIGO), RIGHT: ICD-10-CM

## 2022-09-16 DIAGNOSIS — R26.89 IMBALANCE: ICD-10-CM

## 2022-09-16 DIAGNOSIS — R26.81 GAIT INSTABILITY: ICD-10-CM

## 2022-09-16 DIAGNOSIS — H81.10 BENIGN PAROXYSMAL POSITIONAL VERTIGO, UNSPECIFIED LATERALITY: ICD-10-CM

## 2022-09-16 PROCEDURE — 97112 NEUROMUSCULAR REEDUCATION: CPT | Mod: PN

## 2022-09-16 PROCEDURE — 97162 PT EVAL MOD COMPLEX 30 MIN: CPT | Mod: PN

## 2022-09-16 NOTE — PLAN OF CARE
[facility]  Physical Therapy Neurological Rehabilitation Initial Evaluation    Name: Kenyon Hunt  Clinic Number: [id number]    Therapy Diagnosis:   Encounter Diagnoses   Name Primary?    Gait instability     Benign paroxysmal positional vertigo, unspecified laterality     BPPV (benign paroxysmal positional vertigo), right     Imbalance      Physician: Randal Chambers MD    Physician Orders: PT Eval and Treat  Medical Diagnosis from Referral:   R26.81 (ICD-10-CM) - Gait instability   H81.10 (ICD-10-CM) - Benign paroxysmal positional vertigo, unspecified laterality   Evaluation Date: 9/16/2022  Authorization Period Expiration: 10/07/2022  Plan of Care Expiration: 10/28/2022  Visit # / Visits authorized: 1/ 12    Time In: 11:15AM  Time Out: 12:03AM  Total Billable Time: 48 minutes (1 mod eval; 1 NMR)    Precautions: Standard and Fall    Subjective   Date of onset: Worsening dizziness over last several weeks  History of current condition - Kenyon reports: Feels like some of his medications make him dizzy. Has had episodes that appear to be related to postural hypotension since being discharged from PT (notes one episode where stood up quickly and he immediately fell over). Feels like he is walking much better with his three wheeled walker however. His family reminds him to use it at all times. Says when he lays down, he will still feel dizziness as well.      Medical History:   Past Medical History:   Diagnosis Date    Strabismus      Surgical History:   Kenyon Hunt  has a past surgical history that includes Shoulder surgery; pace maker; and Cataract extraction w/  intraocular lens implant (Bilateral).    Medications:   Kenyon has a current medication list which includes the following prescription(s): ascorbic acid (vitamin c), aspirin, atorvastatin, bupropion, calcium carbonate, carbidopa-levodopa  mg, carbidopa-levodopa  mg, coenzyme q10, ergocalciferol (vitamin  "d2), escitalopram oxalate, fluticasone propionate, irbesartan, lorazepam, melatonin, melatonin-pyridoxine hcl (b6), memantine, metoprolol tartrate, milk thistle, multivit with minerals/lutein, omega-3 fatty acids, pantoprazole, potassium chloride, potassium, tamsulosin, turmeric, vitamin d, and zinc.    Allergies:   Review of patient's allergies indicates:   Allergen Reactions    Carbamazepine Rash        Imaging  - MRA Brain/Neck (5/2/2022): Moderate chronic microvascular ischemic change and cerebral volume loss. Prominent perivascular spaces throughout bilateral basal ganglia. No evidence of acute infarct or hemorrhage. MR angiogram of the head and neck appears within normal limits, allowing for some artifact.  No evidence of high-grade stenosis or large vessel occlusion..    Prior Therapy: In the past for similar complaints.  Social History: Lives with wife   Falls: 1 fall in the last 6 weeks (appears more orthostatic in nature)   DME: Three-wheeled walker  Home Environment: Single story home; level entrance   Exercise Routine / History: Recumbent bike at home   Family Present at time of Eval: Son   Occupation: Retired  Prior Level of Function: Not consistently walking with AD; falling frequently; dizzy  Current Level of Function: No longer driving, mod I with three-wheeled walker, not falling as frequently, still dizzy    Pain:  Current 0/10, worst 0/10, best 0/10   Location: N/A  Description: N/A  Aggravating Factors: N/A  Easing Factors: N/A    Patient's goals: To improve balance, dizziness, and walking capacity    Objective     History of Current Symptoms   Triggers: Laying down; standing up quickly   Alleviating Factors: Getting out of aggravating symptoms; short, standing rest   Description of symptoms: "Feels like I'm gonna pass out"    Onset: Has worsened since discharge from PT several weeks ago   Frequency: Daily   Duration: Seconds   Positional changes: See above   Limitations due to symptoms: " Decreased quality of life    Objective   - Follows commands: 100% simple; 75% complex  - Speech: no deficits     Mental status: alert, oriented to person, place, and time  Appearance: Casually dressed  Behavior:  calm, cooperative, and adequate rapport can be established  Attention Span and Concentration:  Normal        Visual/Auditory:   Tracking/Smooth Pursuits: Impaired (saccadic intrusion all directions); (-) dizziness  Saccades: Dysmetria with leftward and upward eye movement; (-) dizziness  Acuity: Wears prism glasses  Visual field: Decreased peripheral vision bilaterally   Convergence: Divergence and convergence insufficiency; (-) dizziness  VOR: Impaired (corrective saccade with right head impulse); (+) minimal dizziness    MCT-SIB: NEXT    POSITIONAL CANAL TESTING  Looking for nystagmus (slow drift to affected side with quick correction away)    Kevil Hallpike (posterior / CL anterior)   Right: + torsional nystagmus; + symptoms   Left: - torsional nystagmus; - symptoms   Horizontal Canals   Right: Not assessed   Left: Not assessed  Treatment Performed: Right Epley maneuver x 1    CMS Impairment/Limitation/Restriction for FOTO Dizziness Handicap Inventory Survey    Therapist reviewed FOTO scores for Kenyon Hunt on 9/16/2022.   FOTO documents entered into [employer] - see Media section.    Limitation Score: 50% (predicted = 45%)         TREATMENT   Treatment Time In: 11:53AM  Treatment Time Out: 12:02AM  Total Treatment time separate from Evaluation: 9 minutes    Kenyon participated in neuromuscular re-education activities to improve: Dizziness and Canalith Positions for 9 minutes. The following activities were included:  - Right leading Epley maneuver  - Patient education on post-Epley positioning    Home Exercises and Patient Education Provided    Education provided:   - PT plan of care  - Basic anatomy / physiology of vestibular system    Written Home Exercises Provided:  Not today .    Assessment   Kenyon  is a 81 y.o. male referred to outpatient Physical Therapy with a medical diagnosis of (1) R26.81 (ICD-10-CM) - Gait instability and (2) H81.10 (ICD-10-CM) - Benign paroxysmal positional vertigo, unspecified laterality. Patient presents with + Right [place]-Hallpike; abnormal oculomotor function; decreased self-perception of functional mobility per FOTO; and dizziness/imbalance with functional mobility. Patient known to this clinic and appears to be having recurrence of right posterior canal BPPV; patient also has history of cerebellar CVA that is likely contributing to oculomotor findings. Per self-report regarding symptom behavior, dizziness also likely has an orthostatic component that will not be amenable to PT. Patient would benefit from skilled physical therapy services to resolve recurrence of BPPV and improve low level balance and mobility skills with assistive device.    Patient prognosis is Good.   Patient will benefit from skilled outpatient Physical Therapy to address the deficits stated above and in the chart below, provide patient/family education, and to maximize patient's level of independence.     Plan of care discussed with patient: Yes  Patient's spiritual, cultural and educational needs considered and patient is agreeable to the plan of care and goals as stated below:     Anticipated Barriers for therapy: Patient is not driving and caregivers have limited availability     Medical Necessity is demonstrated by the following  History  Co-morbidities and personal factors that may impact the plan of care Co-morbidities:   Strabismus, History of Fall, History of Stroke    Personal Factors:   age     high   Examination  Body Structures and Functions, activity limitations and participation restrictions that may impact the plan of care Body Regions:   head  neck    Body Systems:    gross coordinated movement  transfers  transitions  motor control  motor learning    Participation Restrictions:   Decreased  quality of life secondary to dizziness    Activity limitations:   Learning and applying knowledge  no deficits    General Tasks and Commands  undertaking multiple tasks    Communication  no deficits    Mobility  lifting and carrying objects  walking  driving (bike, car, motorcycle)    Self care  looking after one's health    Domestic Life  shopping  cooking  doing house work (cleaning house, washing dishes, laundry)  assisting others    Interactions/Relationships  no deficits    Life Areas  no deficits    Community and Social Life  community life  recreation and leisure         high   Clinical Presentation evolving clinical presentation with changing clinical characteristics moderate   Decision Making/ Complexity Score: moderate     Goals:  Short Term Goals: 3 weeks   Patient will be compliant with HEP in order to maximize PT benefits  Patient will complete TUG in </= 30 seconds with least restrictive assistive device in order to reduce risk for falls and improve safety with functional mobility  Patient will demonstrate negative Dixie-Hallpike and Roll Tests bilaterally in order to decrease dizziness with bed mobility    Long Term Goals: 6 weeks   Patient will score </= 45% on FOTO limitation survey in order to improve self-perception of functional mobility deficits  Patient will report no positional vertigo symptoms over 6-week period in order to improve general activity tolerance  Patient will complete TUG in </= 25 seconds with least restrictive assistive device in order to reduce risk for falls and improve safety with functional mobility  Patient will report 0 falls from initiation of PT management  Patient will begin some form of home/community fitness in order to sustain progress gained in PT    Plan   Plan of care Certification: 9/16/2022 to 10/28/2022.    Outpatient Physical Therapy 2 times weekly for 6 weeks to include the following interventions: Gait Training, Manual Therapy, Moist Heat/ Ice, Neuromuscular  Re-ed, Patient Education, Self Care, Therapeutic Activities, Therapeutic Exercise, and Modalities PRN .     BALDEMAR SESAY, PT

## 2022-09-18 PROBLEM — H81.11 BPPV (BENIGN PAROXYSMAL POSITIONAL VERTIGO), RIGHT: Status: ACTIVE | Noted: 2022-09-18

## 2022-09-18 PROBLEM — R26.89 IMBALANCE: Status: ACTIVE | Noted: 2022-09-18

## 2022-09-22 ENCOUNTER — PES CALL (OUTPATIENT)
Dept: ADMINISTRATIVE | Facility: CLINIC | Age: 82
End: 2022-09-22
Payer: MEDICARE

## 2022-09-23 ENCOUNTER — TELEPHONE (OUTPATIENT)
Dept: INTERNAL MEDICINE | Facility: CLINIC | Age: 82
End: 2022-09-23
Payer: MEDICARE

## 2022-09-23 NOTE — TELEPHONE ENCOUNTER
----- Message from Rachael Alfaro sent at 9/23/2022  9:32 AM CDT -----  Pt missed a call and would the nurse to return their call.    Pt can be reached at  [phone]

## 2022-09-23 NOTE — TELEPHONE ENCOUNTER
----- Message from Princess Atkinson sent at 9/23/2022 10:01 AM CDT -----  Contact: Self/[phone]  Patient is returning a phone call.  Who left a message for the patient: Rosina   Does patient know what this is regarding:  no   Would you like a call back, or a response through your [facility] portal?:   call back   Comments: pt stated that he keeps missing calls from the office and Is not sure what the calls are about

## 2022-10-03 ENCOUNTER — CLINICAL SUPPORT (OUTPATIENT)
Dept: REHABILITATION | Facility: HOSPITAL | Age: 82
End: 2022-10-03
Payer: MEDICARE

## 2022-10-03 DIAGNOSIS — H81.11 BPPV (BENIGN PAROXYSMAL POSITIONAL VERTIGO), RIGHT: Primary | ICD-10-CM

## 2022-10-03 DIAGNOSIS — R26.89 IMBALANCE: ICD-10-CM

## 2022-10-03 PROCEDURE — 97112 NEUROMUSCULAR REEDUCATION: CPT | Mod: PN

## 2022-10-03 NOTE — PROGRESS NOTES
[facility]   Physical Therapy Treatment Note / Discharge Summary    Name: Kenyon Mccarthy [facility]  Clinic Number: [id number]    Therapy Diagnosis:   Encounter Diagnoses   Name Primary?    BPPV (benign paroxysmal positional vertigo), right Yes    Imbalance      Physician: Randal Chambers MD    Visit Date: 10/3/2022    Physician Orders: PT Eval and Treat  Medical Diagnosis from Referral:   R26.81 (ICD-10-CM) - Gait instability   H81.10 (ICD-10-CM) - Benign paroxysmal positional vertigo, unspecified laterality   Evaluation Date: 9/16/2022  Authorization Period Expiration: 10/07/2022  Plan of Care Expiration: 10/28/2022  Visit # / Visits authorized: 2/ 12      PTA Visit #: 0/5     Time In: 10:30 AM  Time Out: 11:05 AM  Total Billable Time: 35 minutes (2 NMR)    SUBJECTIVE     Pt reports: Pt denies dizziness.  Pt says his only issue remains posterior loss of balance when brushing his teeth.  He was compliant with home exercise program.  Response to previous treatment: mild dizziness immediately after eval  Functional change: no dizziness in the past few days.    Pain: 0/10  Location: N/A      OBJECTIVE     Objective Measures updated at progress report unless specified.     Dixie Hallpike (posterior / CL anterior)              Right: - torsional nystagmus; - symptoms              Left: - torsional nystagmus; - symptoms   Horizontal Canals              Right: - nystagmus, - symptoms              Left: - nystagmus, - symptoms  Treatment Performed: no treatment indicated      MCTSIB: feet together and arms crossed unless otherwise noted    Evaluation (10/3/2022)   EO on firm surface 30 sec - pass   EC on firm surface 13 sec - Fail   EO on compliant surface 30 sec - pass   EC on compliant surface 2 sec - fail       FOTO: 39% limitation    Treatment     Kenyon received the treatments listed below:        neuromuscular re-education activities to improve: canalith repositioning, vestibular function,  balance for 35 minutes. The following activities were included:  - reassessment of posterior and horizontal canals - see above  - MCTSIB performed  - FOTO completed  - patient/caregiver(son) education regarding increased risk for posterior loss of balance and poor righting reactions.  Recommended patient perform grooming activities sitting at sink          Patient Education and Home Exercises     Home Exercises Provided and Patient Education Provided     Education provided:   - safety education    Written Home Exercises Provided:  pt instructed to complete previously issued home exercise program . Exercises were reviewed and Kenyon was able to demonstrate them prior to the end of the session.  Kenyon demonstrated good  understanding of the education provided. See EMR under Patient Instructions for exercises provided during therapy sessions    ASSESSMENT     Kenyon presents to first follow up visit post evaluation reporting resolution of spinning sensations.  Bilateral posterior and horizontal canals assessed and no nystagmus or symptoms were elicited.  Pt continues to demonstrate posterior loss of balance and poor righting reactions.  Recommendations made to patient and son to start completing grooming activities in sitting position due to multiple falls occurring in the bathroom when brushing teeth.    Kenyon Is progressing well towards his goals.   Pt prognosis is Good.     Pt will continue to benefit from skilled outpatient physical therapy to address the deficits listed in the problem list box on initial evaluation, provide pt/family education and to maximize pt's level of independence in the home and community environment.     Pt's spiritual, cultural and educational needs considered and pt agreeable to plan of care and goals.     Anticipated barriers to physical therapy: Patient is not driving and caregivers have limited availability     Goals:   Short Term Goals: 3 weeks   Patient will be compliant with HEP in  order to maximize PT benefits. MET  Patient will complete TUG in </= 30 seconds with least restrictive assistive device in order to reduce risk for falls and improve safety with functional mobility  Patient will demonstrate negative Dixie-Hallpike and Roll Tests bilaterally in order to decrease dizziness with bed mobility. MET     Long Term Goals: 6 weeks   Patient will score </= 45% on FOTO limitation survey in order to improve self-perception of functional mobility deficits. MET  Patient will report no positional vertigo symptoms over 6-week period in order to improve general activity tolerance  Patient will complete TUG in </= 25 seconds with least restrictive assistive device in order to reduce risk for falls and improve safety with functional mobility  Patient will report 0 falls from initiation of PT management  Patient will begin some form of home/community fitness in order to sustain progress gained in PT       PLAN     Discharge as per patient and caregiver request due to resolution of BPPV.    Priscilla Cornejo, PT

## 2022-11-18 ENCOUNTER — PES CALL (OUTPATIENT)
Dept: ADMINISTRATIVE | Facility: CLINIC | Age: 82
End: 2022-11-18
Payer: MEDICARE

## 2022-12-23 ENCOUNTER — TELEPHONE (OUTPATIENT)
Dept: FAMILY MEDICINE | Facility: CLINIC | Age: 82
End: 2022-12-23
Payer: MEDICARE

## 2023-02-22 ENCOUNTER — OFFICE VISIT (OUTPATIENT)
Dept: NEUROLOGY | Facility: CLINIC | Age: 83
End: 2023-02-22
Payer: MEDICARE

## 2023-02-22 VITALS
HEART RATE: 82 BPM | WEIGHT: 165.38 LBS | DIASTOLIC BLOOD PRESSURE: 94 MMHG | BODY MASS INDEX: 27.56 KG/M2 | HEIGHT: 65 IN | SYSTOLIC BLOOD PRESSURE: 174 MMHG

## 2023-02-22 DIAGNOSIS — H81.10 BENIGN PAROXYSMAL POSITIONAL VERTIGO, UNSPECIFIED LATERALITY: ICD-10-CM

## 2023-02-22 DIAGNOSIS — R26.81 GAIT INSTABILITY: ICD-10-CM

## 2023-02-22 DIAGNOSIS — G20.C PARKINSONISM, UNSPECIFIED PARKINSONISM TYPE: Primary | ICD-10-CM

## 2023-02-22 PROCEDURE — 3080F DIAST BP >= 90 MM HG: CPT | Mod: CPTII,GC,S$GLB, | Performed by: STUDENT IN AN ORGANIZED HEALTH CARE EDUCATION/TRAINING PROGRAM

## 2023-02-22 PROCEDURE — 1126F AMNT PAIN NOTED NONE PRSNT: CPT | Mod: CPTII,GC,S$GLB, | Performed by: STUDENT IN AN ORGANIZED HEALTH CARE EDUCATION/TRAINING PROGRAM

## 2023-02-22 PROCEDURE — 1159F PR MEDICATION LIST DOCUMENTED IN MEDICAL RECORD: ICD-10-PCS | Mod: CPTII,GC,S$GLB, | Performed by: STUDENT IN AN ORGANIZED HEALTH CARE EDUCATION/TRAINING PROGRAM

## 2023-02-22 PROCEDURE — 3080F PR MOST RECENT DIASTOLIC BLOOD PRESSURE >= 90 MM HG: ICD-10-PCS | Mod: CPTII,GC,S$GLB, | Performed by: STUDENT IN AN ORGANIZED HEALTH CARE EDUCATION/TRAINING PROGRAM

## 2023-02-22 PROCEDURE — 1100F PR PT FALLS ASSESS DOC 2+ FALLS/FALL W/INJURY/YR: ICD-10-PCS | Mod: CPTII,GC,S$GLB, | Performed by: STUDENT IN AN ORGANIZED HEALTH CARE EDUCATION/TRAINING PROGRAM

## 2023-02-22 PROCEDURE — 3077F PR MOST RECENT SYSTOLIC BLOOD PRESSURE >= 140 MM HG: ICD-10-PCS | Mod: CPTII,GC,S$GLB, | Performed by: STUDENT IN AN ORGANIZED HEALTH CARE EDUCATION/TRAINING PROGRAM

## 2023-02-22 PROCEDURE — 3077F SYST BP >= 140 MM HG: CPT | Mod: CPTII,GC,S$GLB, | Performed by: STUDENT IN AN ORGANIZED HEALTH CARE EDUCATION/TRAINING PROGRAM

## 2023-02-22 PROCEDURE — 99999 PR PBB SHADOW E&M-EST. PATIENT-LVL V: ICD-10-PCS | Mod: PBBFAC,GC,, | Performed by: STUDENT IN AN ORGANIZED HEALTH CARE EDUCATION/TRAINING PROGRAM

## 2023-02-22 PROCEDURE — 99214 OFFICE O/P EST MOD 30 MIN: CPT | Mod: GC,S$GLB,, | Performed by: STUDENT IN AN ORGANIZED HEALTH CARE EDUCATION/TRAINING PROGRAM

## 2023-02-22 PROCEDURE — 1100F PTFALLS ASSESS-DOCD GE2>/YR: CPT | Mod: CPTII,GC,S$GLB, | Performed by: STUDENT IN AN ORGANIZED HEALTH CARE EDUCATION/TRAINING PROGRAM

## 2023-02-22 PROCEDURE — 3288F PR FALLS RISK ASSESSMENT DOCUMENTED: ICD-10-PCS | Mod: CPTII,GC,S$GLB, | Performed by: STUDENT IN AN ORGANIZED HEALTH CARE EDUCATION/TRAINING PROGRAM

## 2023-02-22 PROCEDURE — 99499 RISK ADDL DX/OHS AUDIT: ICD-10-PCS | Mod: S$GLB,,, | Performed by: STUDENT IN AN ORGANIZED HEALTH CARE EDUCATION/TRAINING PROGRAM

## 2023-02-22 PROCEDURE — 1159F MED LIST DOCD IN RCRD: CPT | Mod: CPTII,GC,S$GLB, | Performed by: STUDENT IN AN ORGANIZED HEALTH CARE EDUCATION/TRAINING PROGRAM

## 2023-02-22 PROCEDURE — 99214 PR OFFICE/OUTPT VISIT, EST, LEVL IV, 30-39 MIN: ICD-10-PCS | Mod: GC,S$GLB,, | Performed by: STUDENT IN AN ORGANIZED HEALTH CARE EDUCATION/TRAINING PROGRAM

## 2023-02-22 PROCEDURE — 99999 PR PBB SHADOW E&M-EST. PATIENT-LVL V: CPT | Mod: PBBFAC,GC,, | Performed by: STUDENT IN AN ORGANIZED HEALTH CARE EDUCATION/TRAINING PROGRAM

## 2023-02-22 PROCEDURE — 99499 UNLISTED E&M SERVICE: CPT | Mod: S$GLB,,, | Performed by: STUDENT IN AN ORGANIZED HEALTH CARE EDUCATION/TRAINING PROGRAM

## 2023-02-22 PROCEDURE — 3288F FALL RISK ASSESSMENT DOCD: CPT | Mod: CPTII,GC,S$GLB, | Performed by: STUDENT IN AN ORGANIZED HEALTH CARE EDUCATION/TRAINING PROGRAM

## 2023-02-22 PROCEDURE — 1126F PR PAIN SEVERITY QUANTIFIED, NO PAIN PRESENT: ICD-10-PCS | Mod: CPTII,GC,S$GLB, | Performed by: STUDENT IN AN ORGANIZED HEALTH CARE EDUCATION/TRAINING PROGRAM

## 2023-02-22 RX ORDER — CARBIDOPA AND LEVODOPA 25; 100 MG/1; MG/1
2 TABLET ORAL 3 TIMES DAILY
Qty: 180 TABLET | Refills: 11 | Status: SHIPPED | OUTPATIENT
Start: 2023-02-22 | End: 2024-03-18

## 2023-02-24 NOTE — PROGRESS NOTES
[facility] - NEUROLOGY 7TH [place] [facility], [place]    Date: 2/22/23  Patient Name: Kenyon Hunt   MRN: [id number]   PCP: Italia Ramírez  Referring Provider: No ref. provider found    Assessment:   Kenyon Hunt is a 82 y.o. male presenting to clinic with family for follow up of known PD. Reviewed MRI again with family. Discussed expectations of medication management given severity of MRI findings in BG bilaterally. They voiced understanding. Given mild improvement of gait and standing with current sinemet dose and lack of side effects - discussed increasing to 2 pills TID. Patient amenable to trialing higher dose. Can consider adding amantadine or MOA-B in future. Addiitonally referred to CM and SW for discussion of home needs and possible transportation to PT. PT reordered given patients improvement with therapy. Addiitoanlly ordered HIV and crypto to r/o etiologies of MRI findings.     Plan:     Continue Mematine  PT ordered for both vestibular therapy (Epley Maneuver) and PT for movement/strength  Increased Sinemet to 2 pills TID; can decrease in future if with side effects. Can consider adding amantadine or MOA-B in future  Referred to CM and SW for discussion of home needs and possible transportation to PT  Ordered HIV and crypto to r/o etiologies of MRI findings; will review results  Patient to call or message with any questions or concerns or problems with the medications  He and wife voiced understanding of and agreement with this plan as outlined  Will follow up in 6 weeks    Problem List Items Addressed This Visit    None  Visit Diagnoses       Parkinsonism, unspecified Parkinsonism type    -  Primary    Relevant Orders    Ambulatory referral/consult to Social Work    Ambulatory referral/consult to Outpatient Case Management    HIV 1/2 Ag/Ab (4th Gen)    CRYPTOCOCCUS ANTIBODY    Benign paroxysmal positional vertigo, unspecified laterality        Relevant  "Orders    Ambulatory referral/consult to Physical/Occupational Therapy    Gait instability        Relevant Orders    Ambulatory referral/consult to Social Work    Ambulatory referral/consult to Outpatient Case Management            Randal Chambers MD    Patient note was created using MModal Dictation.  Any errors in syntax or even information may not have been identified and edited on initial review prior to signing this note.  Subjective:   Patient seen in consultation at the request of No ref. provider found for the evaluation of gait instability. A copy of this note will be sent to the referring physician.     Interval History 2/24/22:  Patient presented to clinic to follow up of known PD. See HPI and interval history for further detail. The patient was with family for visit. States that he has had some improvement of his gait and ability to stand up from chair on current dose of sinemet. No current side effects to medication currently. Does still endorse some dizziness, which is intermittent in nature. Most prominent in mornings. Additionally endorses some stiffness in mornings which subsides over course of day.     HPI:   Mr. Kenyon Hunt is a 82 y.o. male with prostate cancer, htn, hld, CAD (Pacemaker) who is presenting clinic with complaints of gait instability. He presented with his wife who helped provide history. They report that the patient has had a 4 year history of difficulty walking and with balance and with initiating walking. Now uses rollator. Cant walk more than five feet without holding onto something. 4 falls in last year. Some dizziness. Feels like he has a band around his head and is pressing down on his eyes. "Not as dizzy as he is off balance". No imaging or other workup in last four years.    Patient had recently seen Dr Ruiz with neuroophthalmology. Plan at that time for neuroimaging with continuation of baclofen for oscillopsia and 6 month follow up.     Interval History " 5/20/22:  Patient presented to clinic for follow up of gait instability. The patient reports that he felt as though he had side effects to the baclofen and therefore stopped the medication (prescribed by ophtho). He reports no significant changes in his symptoms since last visit. He still reports ongoing intermittent dizziness as well as shuffling gait with falls and difficulty turning. He denies any urinary incontinence or changes in cognition. MRI/A completed since last visit demonstrated moderate chronic microvascular ischemic change, with cerebral volume loss, prominent perivascular spaces throughout bilateral basal ganglia, a remote L cerebellar infarct, and no significant vessel abnormalities.     Interval history 9/1/22:  Patient presented to clinic for follow up of gait instability, dizziness, and features of parkinsonism. The patient reported that his dizziness improved significantly following completion of the Epley maneuver with PT but reports that he now has some recurrence of his symptom. He additionally noted initial improvement of his gait and coordination after starting to work with PT and while taking Sinamet initially. He is now no longer working with PT. He additionally reported compliance with Sinemet  TID but stated that he took the medications with meals most often. Patient had recently seen Dr Ruiz for visual complaints - who recommended starting Memantine if approved by neurology.     PAST MEDICAL HISTORY:  Past Medical History:   Diagnosis Date    Strabismus        PAST SURGICAL HISTORY:  Past Surgical History:   Procedure Laterality Date    CATARACT EXTRACTION W/  INTRAOCULAR LENS IMPLANT Bilateral     pace maker      SHOULDER SURGERY         CURRENT MEDS:  Current Outpatient Medications   Medication Sig Dispense Refill    ascorbic acid, vitamin C, (VITAMIN C) 1000 MG tablet Take 1,000 mg by mouth.      aspirin (ECOTRIN) 81 MG EC tablet Take 1 tablet (81 mg total) by mouth once  daily. 90 tablet 3    atorvastatin (LIPITOR) 10 MG tablet Take 1 tablet (10 mg total) by mouth once daily. 90 tablet 3    buPROPion (WELLBUTRIN SR) 200 MG SR12 Take 1 tablet (200 mg total) by mouth 2 (two) times daily. 180 tablet 3    calcium carbonate (OS-FELICITY) 600 mg calcium (1,500 mg) Tab Take 600 mg by mouth 2 (two) times a day.      coenzyme Q10 100 mg capsule Take 100 mg by mouth.      ergocalciferol, vitamin D2, (VITAMIN D ORAL) Take 1,000 Int'l Units by mouth 2 (two) times a day.      EScitalopram oxalate (LEXAPRO) 20 MG tablet Take 1 tablet (20 mg total) by mouth once daily. 90 tablet 3    fluticasone propionate (FLONASE) 50 mcg/actuation nasal spray 1 spray (50 mcg total) by Each Nostril route once daily. 32 g 11    irbesartan (AVAPRO) 150 MG tablet Take 150 mg by mouth once daily.      LORazepam (ATIVAN) 0.5 MG tablet TAKE 1 TABLET(0.5 MG) BY MOUTH EVERY 8 HOURS AS NEEDED FOR ANXIETY 90 tablet 3    melatonin 5 mg Cap Take 1 capsule by mouth every evening.      melatonin-pyridoxine HCl, B6, 5-10 mg TbIE Take 5 mg by mouth.      memantine (NAMENDA) 5 MG Tab Take 1 tablet (5 mg total) by mouth 2 (two) times daily. 60 tablet 11    metoprolol tartrate (LOPRESSOR) 25 MG tablet Take 25 mg by mouth 2 (two) times a day.      MILK THISTLE ORAL Take by mouth.      multivit with minerals/lutein (MULTIVITAMIN 50 PLUS ORAL) Take by mouth.      omega-3 fatty acids 1,000 mg Cap Take 2 g by mouth 2 (two) times a day.      pantoprazole (PROTONIX) 20 MG tablet Take 20 mg by mouth once daily.      POTASSIUM CHLORIDE ORAL Take 50 mg by mouth.      POTASSIUM ORAL Take by mouth.      tamsulosin (FLOMAX) 0.4 mg Cap Take 1 capsule (0.4 mg total) by mouth once daily. 90 capsule 3    TURMERIC ORAL Take by mouth.      vitamin D (VITAMIN D3) 1000 units Tab Take 1,000 Units by mouth 2 (two) times a day.      zinc 50 mg Tab Take by mouth.      carbidopa-levodopa  mg (SINEMET)  mg per tablet Take 2 tablets by mouth 3 (three)  "times daily. 180 tablet 11     No current facility-administered medications for this visit.       ALLERGIES:  Review of patient's allergies indicates:   Allergen Reactions    Carbamazepine Rash       FAMILY HISTORY:  Family History   Problem Relation Age of Onset    Amblyopia Neg Hx     Blindness Neg Hx     Cataracts Neg Hx     Glaucoma Neg Hx     Macular degeneration Neg Hx     Retinal detachment Neg Hx     Strabismus Neg Hx        SOCIAL HISTORY:  Social History     Tobacco Use    Smoking status: Former    Smokeless tobacco: Never   Substance Use Topics    Alcohol use: Yes       Review of Systems:  12 system review of systems is negative except for the symptoms mentioned in HPI.      Objective:     Vitals:    02/22/23 1551   BP: (!) 174/94   Pulse: 82   Weight: 75 kg (165 lb 5.5 oz)   Height: 5' 5" (1.651 m)     General: NAD, well nourished   Eyes: no tearing, discharge, no erythema   ENT: moist mucous membranes of the oral cavity, nares patent    Neck: Supple, full range of motion  Cardiovascular: Warm and well perfused, pulses equal and symmetrical  Lungs: Normal work of breathing, normal chest wall excursions  Skin: No rash, lesions, or breakdown on exposed skin  Psychiatry: Mood and affect are appropriate   Abdomen: soft, non tender, non distended  Extremeties: No cyanosis, clubbing or edema.  Difficulty walking and with sensation of feet  + Falls without head trauma  +Dizziness    Neurological   MENTAL STATUS: Alert and oriented to person, place, and time. Attention and concentration within normal limits. Speech without dysarthria, able to name and repeat without difficulty. Recent and remote memory within normal limits   CRANIAL NERVES: Visual fields intact. PERRL. EOMI. Facial sensation intact. Face symmetrical. Hearing grossly intact. Full shoulder shrug bilaterally. Tongue protrudes midline   SENSORY: Diminished sensation to light touch, vibration, temperature in bilateral LE below ankle. Proprioreception " intact to movement of toe. Postiive romberg.   MOTOR: Normal bulk and tone. No pronator drift.  5/5 deltoid, biceps, triceps, interosseous, hand  bilaterally. 4+/5 iliopsoas, knee extension/flexion bilaterally but mildly weaker on left. 4-/5 plantar and dorsiflexion on L. 4-/5 plantar flexion on left. 1/5 dorsiflexion on left.   REFLEXES: Symmetric and 2+ throughout. Toes down going bilaterally.   CEREBELLAR/COORDINATION/GAIT: Shuffling gait with end block turning; mildly improved from prior. Able to stand from chair without assitance of hands. Foot drop while ambulating. Rapid movement, fine motor, and coordination wnl but wth some decrease in movements  No rigidity or tremor noted on exam  Decrement on repeated motions while using hand (L>R)    Imaging:    MRI/A completed since last visit demonstrated moderate chronic microvascular ischemic change, with cerebral volume loss, prominent perivascular spaces throughout bilateral basal ganglia, a remote L cerebellar infarct, and no significant vessel abnormalities.

## 2023-02-27 ENCOUNTER — TELEPHONE (OUTPATIENT)
Dept: FAMILY MEDICINE | Facility: CLINIC | Age: 83
End: 2023-02-27
Payer: MEDICARE

## 2023-03-08 ENCOUNTER — TELEPHONE (OUTPATIENT)
Dept: PRIMARY CARE CLINIC | Facility: CLINIC | Age: 83
End: 2023-03-08
Payer: MEDICARE

## 2023-03-08 NOTE — TELEPHONE ENCOUNTER
----- Message from Joy Calvo sent at 3/8/2023  9:36 AM CST -----  Contact: [phone]  Pt coming in to see you Monday for a 6 months f/u and wants to know if labs are needed prior. If so, can you please send orders to [facility]? Thanks

## 2023-03-08 NOTE — TELEPHONE ENCOUNTER
----- Message from Mary Jo Ochoa sent at 3/8/2023 12:08 PM CST -----  Contact: [phone]  Pt is returning a call he missed from the office. Per pt, he is trying to find out if he and his wife have blood work to do with [facility] before their appts. Please call.          Thank you

## 2023-03-08 NOTE — TELEPHONE ENCOUNTER
----- Message from Joe Pugh sent at 3/8/2023 11:55 AM CST -----  Contact: pt  Type:  Patient Returning Call    Who Called:pt  Who Left Message for Patient:cleopatra  Does the patient know what this is regarding?:n/a  Would the patient rather a call back or a response via [facility]? call  Best Call Back Number:[phone]  Additional Information:

## 2023-03-13 ENCOUNTER — OFFICE VISIT (OUTPATIENT)
Dept: PRIMARY CARE CLINIC | Facility: CLINIC | Age: 83
End: 2023-03-13
Payer: MEDICARE

## 2023-03-13 VITALS
HEIGHT: 65 IN | OXYGEN SATURATION: 98 % | BODY MASS INDEX: 28.02 KG/M2 | DIASTOLIC BLOOD PRESSURE: 84 MMHG | SYSTOLIC BLOOD PRESSURE: 138 MMHG | WEIGHT: 168.19 LBS | HEART RATE: 78 BPM

## 2023-03-13 DIAGNOSIS — I25.708: ICD-10-CM

## 2023-03-13 DIAGNOSIS — Z86.73 HISTORY OF CVA (CEREBROVASCULAR ACCIDENT): ICD-10-CM

## 2023-03-13 DIAGNOSIS — G89.29 CHRONIC BILATERAL LOW BACK PAIN WITHOUT SCIATICA: ICD-10-CM

## 2023-03-13 DIAGNOSIS — K21.9 GASTROESOPHAGEAL REFLUX DISEASE WITHOUT ESOPHAGITIS: ICD-10-CM

## 2023-03-13 DIAGNOSIS — R00.1 SYMPTOMATIC BRADYCARDIA: ICD-10-CM

## 2023-03-13 DIAGNOSIS — G20.C PARKINSONISM, UNSPECIFIED PARKINSONISM TYPE: ICD-10-CM

## 2023-03-13 DIAGNOSIS — I10 ESSENTIAL HYPERTENSION: Primary | ICD-10-CM

## 2023-03-13 DIAGNOSIS — M54.50 CHRONIC BILATERAL LOW BACK PAIN WITHOUT SCIATICA: ICD-10-CM

## 2023-03-13 DIAGNOSIS — F41.9 ANXIETY: ICD-10-CM

## 2023-03-13 DIAGNOSIS — Z95.0 PACEMAKER: ICD-10-CM

## 2023-03-13 DIAGNOSIS — I25.10 ARTERIOSCLEROSIS OF CORONARY ARTERY: ICD-10-CM

## 2023-03-13 DIAGNOSIS — E78.2 MIXED HYPERLIPIDEMIA: ICD-10-CM

## 2023-03-13 DIAGNOSIS — R26.81 UNSTEADY GAIT: ICD-10-CM

## 2023-03-13 PROCEDURE — 1159F MED LIST DOCD IN RCRD: CPT | Mod: CPTII,S$GLB,, | Performed by: INTERNAL MEDICINE

## 2023-03-13 PROCEDURE — 1159F PR MEDICATION LIST DOCUMENTED IN MEDICAL RECORD: ICD-10-PCS | Mod: CPTII,S$GLB,, | Performed by: INTERNAL MEDICINE

## 2023-03-13 PROCEDURE — 3075F SYST BP GE 130 - 139MM HG: CPT | Mod: CPTII,S$GLB,, | Performed by: INTERNAL MEDICINE

## 2023-03-13 PROCEDURE — 99999 PR PBB SHADOW E&M-EST. PATIENT-LVL IV: CPT | Mod: PBBFAC,,, | Performed by: INTERNAL MEDICINE

## 2023-03-13 PROCEDURE — 1126F PR PAIN SEVERITY QUANTIFIED, NO PAIN PRESENT: ICD-10-PCS | Mod: CPTII,S$GLB,, | Performed by: INTERNAL MEDICINE

## 2023-03-13 PROCEDURE — 3075F PR MOST RECENT SYSTOLIC BLOOD PRESS GE 130-139MM HG: ICD-10-PCS | Mod: CPTII,S$GLB,, | Performed by: INTERNAL MEDICINE

## 2023-03-13 PROCEDURE — 1126F AMNT PAIN NOTED NONE PRSNT: CPT | Mod: CPTII,S$GLB,, | Performed by: INTERNAL MEDICINE

## 2023-03-13 PROCEDURE — 99999 PR PBB SHADOW E&M-EST. PATIENT-LVL IV: ICD-10-PCS | Mod: PBBFAC,,, | Performed by: INTERNAL MEDICINE

## 2023-03-13 PROCEDURE — 3079F DIAST BP 80-89 MM HG: CPT | Mod: CPTII,S$GLB,, | Performed by: INTERNAL MEDICINE

## 2023-03-13 PROCEDURE — 3079F PR MOST RECENT DIASTOLIC BLOOD PRESSURE 80-89 MM HG: ICD-10-PCS | Mod: CPTII,S$GLB,, | Performed by: INTERNAL MEDICINE

## 2023-03-13 PROCEDURE — 99214 OFFICE O/P EST MOD 30 MIN: CPT | Mod: S$GLB,,, | Performed by: INTERNAL MEDICINE

## 2023-03-13 PROCEDURE — 99214 PR OFFICE/OUTPT VISIT, EST, LEVL IV, 30-39 MIN: ICD-10-PCS | Mod: S$GLB,,, | Performed by: INTERNAL MEDICINE

## 2023-03-13 RX ORDER — BUPROPION HYDROCHLORIDE 200 MG/1
200 TABLET, EXTENDED RELEASE ORAL 2 TIMES DAILY
Qty: 180 TABLET | Refills: 3 | Status: SHIPPED | OUTPATIENT
Start: 2023-03-13 | End: 2023-08-08 | Stop reason: ALTCHOICE

## 2023-03-13 RX ORDER — ATORVASTATIN CALCIUM 10 MG/1
10 TABLET, FILM COATED ORAL DAILY
Qty: 90 TABLET | Refills: 3 | Status: SHIPPED | OUTPATIENT
Start: 2023-03-13 | End: 2023-04-24 | Stop reason: SDUPTHER

## 2023-03-13 RX ORDER — ESCITALOPRAM OXALATE 20 MG/1
20 TABLET ORAL DAILY
Qty: 90 TABLET | Refills: 3 | Status: SHIPPED | OUTPATIENT
Start: 2023-03-13 | End: 2023-08-01 | Stop reason: ALTCHOICE

## 2023-03-13 NOTE — PROGRESS NOTES
"[facility] Note    Chief Complaint      Chief Complaint   Patient presents with    Follow-up       History of Present Illness      Kenyon Hunt is a 82 y.o. male who presents today for follow up of HTN.  Patient comes to appointment with spouse.  Cards: [facility]: Cj, Neuro: Channing    Problem List Items Addressed This Visit       Essential hypertension - Primary    Current Assessment & Plan     Stable on irbesartan 150 mg daily and metoprolol 25 mg BID, no CP/SOB/HA.          Chronic bilateral back pain    Current Assessment & Plan     Last MRI L spine on 10/1/19 with Dr. Jimenez.  Had rizotomy in 1/2022, no longer having pain.         Anxiety    Current Assessment & Plan     Stable on Wellbutrin 200 mg BID, Lexapro 20 mg daily, Lorazepam PRN.  No SI/HI/panic attacks.  Anxiety has been worse since COVID 19 started.   Has panic attacks "every now and then", usually at night. Takes ativan most nights, occasionally takes during the day.         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  No CP/SOB.  Sees Dr. Lawton at [facility].           Hyperlipidemia    Current Assessment & Plan     Stable on lipitor, no myalgias  The ASCVD Risk score (Georgiana DK, et al., 2019) failed to calculate for the following reasons:    The 2019 ASCVD risk score is only valid for ages 40 to 79          Relevant Medications    atorvastatin (LIPITOR) 10 MG tablet    Other Relevant Orders    CBC Auto Differential    Lipid Panel    Comprehensive Metabolic Panel    Gastroesophageal reflux disease without esophagitis    Current Assessment & Plan     Stable on protonix, sees Dr. Lowery.  No issues at present         History of CVA (cerebrovascular accident)    Current Assessment & Plan     After 2/2020, " residual dizziness and optho issues, on ASA and lipitor every other day.         Pacemaker    Symptomatic bradycardia    Current Assessment & Plan     S/p pacemaker with Dr. Vinson (cardiologist is Dr. Lawton), doing better since implant.         Unsteady gait    Current Assessment & Plan     Seeing neuro, using new walker and is doing better but doesn't use all the time.         Parkinsonism    Current Assessment & Plan     Sees Dr. Snell, on Sinemet BID.  Per wife, gait has been stable.  Has dizziness with AM dose on occasion.              Health Maintenance   Topic Date Due    TETANUS VACCINE  09/12/2023 (Originally 9/29/1958)    Aspirin/Antiplatelet Therapy  03/13/2024    Lipid Panel  09/09/2027       Past Medical History:   Diagnosis Date    Strabismus        Past Surgical History:   Procedure Laterality Date    CATARACT EXTRACTION W/  INTRAOCULAR LENS IMPLANT Bilateral     pace maker      SHOULDER SURGERY         family history is not on file.    Social History     Tobacco Use    Smoking status: Former    Smokeless tobacco: Never   Substance Use Topics    Alcohol use: Yes       Review of Systems   Constitutional:  Negative for chills and fever.   Respiratory:  Negative for cough and shortness of breath.    Cardiovascular:  Negative for chest pain and palpitations.   Gastrointestinal:  Negative for constipation, diarrhea, nausea and vomiting.   Genitourinary:  Negative for dysuria and hematuria.   Musculoskeletal:  Negative for falls.   Neurological:  Negative for headaches.      Outpatient Encounter Medications as of 3/13/2023   Medication Sig Dispense Refill    ascorbic acid, vitamin C, (VITAMIN C) 1000 MG tablet Take 1,000 mg by mouth.      aspirin (ECOTRIN) 81 MG EC tablet Take 1 tablet (81 mg total) by mouth once daily. 90 tablet 3    calcium carbonate (OS-FELICITY) 600 mg calcium (1,500 mg) Tab Take 600 mg by mouth 2 (two) times a day.      carbidopa-levodopa  mg (SINEMET)  mg per tablet  Take 2 tablets by mouth 3 (three) times daily. 180 tablet 11    coenzyme Q10 100 mg capsule Take 100 mg by mouth.      ergocalciferol, vitamin D2, (VITAMIN D ORAL) Take 1,000 Int'l Units by mouth 2 (two) times a day.      fluticasone propionate (FLONASE) 50 mcg/actuation nasal spray 1 spray (50 mcg total) by Each Nostril route once daily. 32 g 11    irbesartan (AVAPRO) 150 MG tablet Take 150 mg by mouth once daily.      LORazepam (ATIVAN) 0.5 MG tablet TAKE 1 TABLET(0.5 MG) BY MOUTH EVERY 8 HOURS AS NEEDED FOR ANXIETY 90 tablet 3    melatonin 5 mg Cap Take 1 capsule by mouth every evening.      melatonin-pyridoxine HCl, B6, 5-10 mg TbIE Take 5 mg by mouth.      memantine (NAMENDA) 5 MG Tab Take 1 tablet (5 mg total) by mouth 2 (two) times daily. 60 tablet 11    metoprolol tartrate (LOPRESSOR) 25 MG tablet Take 25 mg by mouth 2 (two) times a day.      MILK THISTLE ORAL Take by mouth.      multivit with minerals/lutein (MULTIVITAMIN 50 PLUS ORAL) Take by mouth.      omega-3 fatty acids 1,000 mg Cap Take 2 g by mouth 2 (two) times a day.      pantoprazole (PROTONIX) 20 MG tablet Take 20 mg by mouth once daily.      POTASSIUM CHLORIDE ORAL Take 50 mg by mouth.      POTASSIUM ORAL Take by mouth.      tamsulosin (FLOMAX) 0.4 mg Cap Take 1 capsule (0.4 mg total) by mouth once daily. 90 capsule 3    TURMERIC ORAL Take by mouth.      vitamin D (VITAMIN D3) 1000 units Tab Take 1,000 Units by mouth 2 (two) times a day.      zinc 50 mg Tab Take by mouth.      [DISCONTINUED] atorvastatin (LIPITOR) 10 MG tablet Take 1 tablet (10 mg total) by mouth once daily. 90 tablet 3    [DISCONTINUED] buPROPion (WELLBUTRIN SR) 200 MG SR12 Take 1 tablet (200 mg total) by mouth 2 (two) times daily. 180 tablet 3    [DISCONTINUED] EScitalopram oxalate (LEXAPRO) 20 MG tablet Take 1 tablet (20 mg total) by mouth once daily. 90 tablet 3    atorvastatin (LIPITOR) 10 MG tablet Take 1 tablet (10 mg total) by mouth once daily. 90 tablet 3    buPROPion  "(WELLBUTRIN SR) 200 MG SR12 Take 1 tablet (200 mg total) by mouth 2 (two) times daily. 180 tablet 3    EScitalopram oxalate (LEXAPRO) 20 MG tablet Take 1 tablet (20 mg total) by mouth once daily. 90 tablet 3    [DISCONTINUED] carbidopa-levodopa  mg (SINEMET)  mg per tablet Take 1 tablet by mouth 3 (three) times daily. 30 tablet 0    [DISCONTINUED] carbidopa-levodopa  mg (SINEMET)  mg per tablet Take 1.5 tablets by mouth 3 (three) times daily. 135 tablet 11    [DISCONTINUED] LORazepam (ATIVAN) 0.5 MG tablet TAKE 1 TABLET(0.5 MG) BY MOUTH EVERY 8 HOURS AS NEEDED FOR ANXIETY 90 tablet 5     No facility-administered encounter medications on file as of 3/13/2023.        Review of patient's allergies indicates:   Allergen Reactions    Carbamazepine Rash       Physical Exam      Vital Signs  Pulse: 78  SpO2: 98 %  BP: 138/84  Pain Score: 0-No pain  Height and Weight  Height: 5' 5" (165.1 cm)  Weight: 76.3 kg (168 lb 3.4 oz)  BSA (Calculated - sq m): 1.87 sq meters  BMI (Calculated): 28  Weight in (lb) to have BMI = 25: 149.9]    Physical Exam  Constitutional:       Appearance: He is well-developed.   HENT:      Head: Normocephalic and atraumatic.   Neck:      Thyroid: No thyromegaly.   Cardiovascular:      Rate and Rhythm: Normal rate and regular rhythm.      Heart sounds: No murmur heard.  Pulmonary:      Effort: Pulmonary effort is normal. No respiratory distress.      Breath sounds: Normal breath sounds.   Abdominal:      General: There is no distension.      Palpations: Abdomen is soft.      Tenderness: There is no abdominal tenderness.   Skin:     General: Skin is warm and dry.   Neurological:      Mental Status: He is alert and oriented to person, place, and time.   Psychiatric:         Behavior: Behavior normal.        Laboratory:  CBC:  No results for input(s): WBC, RBC, HGB, HCT, PLT, MCV, MCH, MCHC in the last 2160 hours.    CMP:  No results for input(s): GLU, CALCIUM, ALBUMIN, PROT, NA, " K, CO2, CL, BUN, ALKPHOS, ALT, AST, BILITOT in the last 2160 hours.    Invalid input(s): CREATININ    URINALYSIS:  No results for input(s): COLORU, CLARITYU, SPECGRAV, PHUR, PROTEINUA, GLUCOSEU, BILIRUBINCON, BLOODU, WBCU, RBCU, BACTERIA, MUCUS, NITRITE, LEUKOCYTESUR, UROBILINOGEN, HYALINECASTS in the last 2160 hours.   LIPIDS:  No results for input(s): TSH, HDL, CHOL, TRIG, LDLCALC, CHOLHDL, NONHDLCHOL, TOTALCHOLEST in the last 2160 hours.    TSH:  No results for input(s): TSH in the last 2160 hours.    A1C:  No results for input(s): HGBA1C in the last 2160 hours.    Radiology:  No results found in the last 30 days.     Assessment/Plan     Kenyon Hunt is a 82 y.o.male with:    1. Essential hypertension    2. Mixed hyperlipidemia  - CBC Auto Differential; Future  - Lipid Panel; Future  - Comprehensive Metabolic Panel; Future  - atorvastatin (LIPITOR) 10 MG tablet; Take 1 tablet (10 mg total) by mouth once daily.  Dispense: 90 tablet; Refill: 3    3. Anxiety  - EScitalopram oxalate (LEXAPRO) 20 MG tablet; Take 1 tablet (20 mg total) by mouth once daily.  Dispense: 90 tablet; Refill: 3    4. Arteriosclerosis of coronary artery    5. Symptomatic bradycardia    6. Pacemaker    7. Gastroesophageal reflux disease without esophagitis    8. Chronic bilateral low back pain without sciatica    9. History of CVA (cerebrovascular accident)    10. Unsteady gait    11. Parkinsonism, unspecified Parkinsonism type    12. 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    -Follow up in about 6 months (around 9/13/2023) for follow up of medical problems.       Italia Ramírez MD  [facility]

## 2023-03-13 NOTE — ASSESSMENT & PLAN NOTE
Sees Dr. Snell, on Sinemet BID.  Per wife, gait has been stable.  Has dizziness with AM dose on occasion.

## 2023-03-13 NOTE — ASSESSMENT & PLAN NOTE
"Stable on Wellbutrin 200 mg BID, Lexapro 20 mg daily, Lorazepam PRN.  No SI/HI/panic attacks.  Anxiety has been worse since COVID 19 started.   Has panic attacks "every now and then", usually at night. Takes ativan most nights, occasionally takes during the day.  "

## 2023-03-15 ENCOUNTER — PATIENT OUTREACH (OUTPATIENT)
Dept: ADMINISTRATIVE | Facility: OTHER | Age: 83
End: 2023-03-15
Payer: MEDICARE

## 2023-03-15 NOTE — PROGRESS NOTES
CHW - Initial Contact    This Community Health Worker completed the Social Determinant of Health questionnaire with patient via telephone today.    Pt identified barriers of most importance are: Mr. Hunt has no barriers at this time.   Follow up required: no  No future outreach task assigned     CHW - Case Closure    This Community Health Worker spoke to patient via telephone today.   Pt denied any needs at this time and agrees with episode closure at this time.

## 2023-03-22 ENCOUNTER — TELEPHONE (OUTPATIENT)
Dept: NEUROLOGY | Facility: CLINIC | Age: 83
End: 2023-03-22
Payer: MEDICARE

## 2023-03-22 ENCOUNTER — PATIENT MESSAGE (OUTPATIENT)
Dept: NEUROLOGY | Facility: CLINIC | Age: 83
End: 2023-03-22
Payer: MEDICARE

## 2023-03-22 NOTE — TELEPHONE ENCOUNTER
----- Message from Mily Calderon sent at 3/22/2023  2:06 PM CDT -----  Regarding: Pt Adv  Contact: [phone]  Pt requesting call back in regards to recent appt. Pt stated  informed him he would need testing done, and home physical therapy, and no one has reached out to him.   Please call and adv @ [phone]

## 2023-03-22 NOTE — TELEPHONE ENCOUNTER
Called patient and left a voice mail explaining he does not need an appointment to go do his blood work for the tests ordered by Dr. Tee. Also gave him the phone number to call to schedule at home physical therapy. Since I could not reach him on the phone I also sent a message on my chart.

## 2023-04-12 ENCOUNTER — TELEPHONE (OUTPATIENT)
Dept: PRIMARY CARE CLINIC | Facility: CLINIC | Age: 83
End: 2023-04-12
Payer: MEDICARE

## 2023-04-12 NOTE — TELEPHONE ENCOUNTER
----- Message from Olga Mauricio sent at 4/12/2023 12:42 PM CDT -----  Type: General Call Back     Name of Caller:Daughter in law   Symptoms:appointments  Would the patient rather a call back or a response via [facility]? Call back   Best Call Back Number:[phone]  Additional Information: Patients daughter in law indicates the patient is starting suffer with dementia. Patient and his  children cannot figure out why he is scheduled to see Italia King. Patients daughter in law would like a call back with some more information. Patients daughter in law has some questions and indicates she would like to discuss them with a nurse on staff.

## 2023-04-13 ENCOUNTER — PATIENT MESSAGE (OUTPATIENT)
Dept: NEUROLOGY | Facility: CLINIC | Age: 83
End: 2023-04-13
Payer: MEDICARE

## 2023-04-19 ENCOUNTER — PES CALL (OUTPATIENT)
Dept: ADMINISTRATIVE | Facility: CLINIC | Age: 83
End: 2023-04-19
Payer: MEDICARE

## 2023-04-24 ENCOUNTER — TELEPHONE (OUTPATIENT)
Dept: PRIMARY CARE CLINIC | Facility: CLINIC | Age: 83
End: 2023-04-24
Payer: MEDICARE

## 2023-04-24 DIAGNOSIS — E78.2 MIXED HYPERLIPIDEMIA: ICD-10-CM

## 2023-04-24 RX ORDER — ATORVASTATIN CALCIUM 10 MG/1
10 TABLET, FILM COATED ORAL EVERY OTHER DAY
Qty: 45 TABLET | Refills: 3 | Status: SHIPPED | OUTPATIENT
Start: 2023-04-24 | End: 2024-03-18 | Stop reason: SDUPTHER

## 2023-04-24 NOTE — TELEPHONE ENCOUNTER
----- Message from Yessi Terry sent at 4/24/2023 11:25 AM CDT -----  Contact: Kasey with  [phone]  Kasey states she had a medication adherence call with the pt today and the pt advised he is taking the atorvastatin (LIPITOR) 10 MG tablet every other day. Kasey states if the Rx directions were changed a new Rx is needed. Kasey states to also reach out to the pt.

## 2023-04-26 ENCOUNTER — PATIENT MESSAGE (OUTPATIENT)
Dept: NEUROLOGY | Facility: CLINIC | Age: 83
End: 2023-04-26
Payer: MEDICARE

## 2023-04-27 DIAGNOSIS — G20.C PARKINSONISM, UNSPECIFIED PARKINSONISM TYPE: Primary | ICD-10-CM

## 2023-05-01 ENCOUNTER — CLINICAL SUPPORT (OUTPATIENT)
Dept: REHABILITATION | Facility: HOSPITAL | Age: 83
End: 2023-05-01
Attending: STUDENT IN AN ORGANIZED HEALTH CARE EDUCATION/TRAINING PROGRAM
Payer: MEDICARE

## 2023-05-01 DIAGNOSIS — H81.10 BENIGN PAROXYSMAL POSITIONAL VERTIGO, UNSPECIFIED LATERALITY: ICD-10-CM

## 2023-05-01 DIAGNOSIS — H81.11 BPPV (BENIGN PAROXYSMAL POSITIONAL VERTIGO), RIGHT: Primary | ICD-10-CM

## 2023-05-01 PROCEDURE — 95992 CANALITH REPOSITIONING PROC: CPT | Mod: PN

## 2023-05-01 PROCEDURE — 97162 PT EVAL MOD COMPLEX 30 MIN: CPT | Mod: PN

## 2023-05-01 NOTE — PLAN OF CARE
[facility]  Physical Therapy Neurological Rehabilitation Initial Evaluation     Name: Kenyon Hunt  Clinic Number: [id number]    Therapy Diagnosis:   Encounter Diagnoses   Name Primary?    Benign paroxysmal positional vertigo, unspecified laterality     BPPV (benign paroxysmal positional vertigo), right Yes     Physician: Randal Chambers MD    Physician Orders: PT Eval and Treat   Medical Diagnosis from Referral: Benign paroxysmal positional vertigo, unspecified laterality [H81.10]  Evaluation Date: 5/1/2023  Authorization Period Expiration: 5/29/2023  Plan of Care Expiration: 6/2/2023  Progress Note Due: 6/1/2023  Visit # / Visits authorized: 1/1  FOTO: 1/5    Precautions: Standard and Fall    Time In: 10:35AM  Time Out: 11:15AM  Total Billable Time: 40 minutes (1 mod eval; 1 CRM)    Subjective      Date of onset: Recurrence of BPPV over last several months    History of current condition - Kenyon reports: Feels dizzy when he stands up, sits down, or rolls around in bed. Dizziness not described as room spinning. Would like to pursue home health PT after assessed/treated for vertigo today.     Imaging  - MRA/MRI Brain (5/2/2022): Moderate chronic microvascular ischemic change and cerebral volume loss. Prominent perivascular spaces throughout bilateral basal ganglia. No evidence of acute infarct or hemorrhage. MR angiogram of the head and neck appears within normal limits, allowing for some artifact.  No evidence of high-grade stenosis or large vessel occlusion.    Prior Therapy: In the past for similar complaints.  Social History: Lives with wife   Falls: estimates 3 falls in the last 6 months   DME: Three-wheeled walker  Home Environment: Single story home; level entrance   Exercise Routine / History: Recumbent bike at home   Family Present at time of Eval: Daughter in law  Occupation: Retired  Prior Level of Function: Negative for BPPV in October  Current Level of Function:  "Recurrence of dizziness within the last 6 months    Pain:  Current 0/10, worst 0/10, best 0/10   Location: N/A  Description: N/A  Aggravating Factors: N/A  Easing Factors: N/A    Patient's goals:  Assess and address vertigo if still present    Medical History:   Past Medical History:   Diagnosis Date    Strabismus      Surgical History:   Kenyon Hunt  has a past surgical history that includes Shoulder surgery; pace maker; and Cataract extraction w/  intraocular lens implant (Bilateral).    Medications:   Kenyon has a current medication list which includes the following prescription(s): ascorbic acid (vitamin c), aspirin, atorvastatin, bupropion, calcium carbonate, carbidopa-levodopa  mg, coenzyme q10, ergocalciferol (vitamin d2), escitalopram oxalate, fluticasone propionate, irbesartan, lorazepam, melatonin, melatonin-pyridoxine hcl (b6), memantine, metoprolol tartrate, milk thistle, multivit with minerals/lutein, omega-3 fatty acids, pantoprazole, potassium chloride, potassium, tamsulosin, turmeric, vitamin d, and zinc.    Allergies:   Review of patient's allergies indicates:   Allergen Reactions    Carbamazepine Rash      Objective      History of Current Symptoms   Triggers: Standing up, sitting down, rolling over in bed   Alleviating Factors: None noted   Description of symptoms: "just dizzy"    Onset: 6 months ago   Frequency: Daily   Duration: "a couple of minutes"   Positional changes: see above   Limitations due to symptoms: Decreased quality of life/imbalance    Objective   - Follows commands: 100% simple; 75% complex    Mental status: alert, oriented to person, place, and time  Appearance: Casually dressed  Behavior:  calm and cooperative  Attention Span and Concentration:  Easily distracted       POSITIONAL CANAL TESTING  Looking for nystagmus (slow drift to affected side with quick correction away)    [place] Hallpike (posterior / CL anterior)   Right: + for torsional nystagmus and " dizziness   Left: -  Horizontal Canals   Right: -   Left: -  Treatment Performed: Epley x 2 (right leading)    CMS Impairment/Limitation/Restriction for FOTO Dizziness Functional Status (DFS) Survey    Therapist reviewed FOTO scores for Kenyon Hunt on 5/1/2023.   FOTO documents entered into [employer] - see Media section.    Score: 50.1/67       Treatment     Total Treatment time separate from Evaluation: 15 minutes    Kenyon received canalith repositioning to address BPPV  for 15 minutes including:  - Epley (right leading) x 2  - Patient/caregiver education on self-Epley maneuver to be used on as needed basis     Patient Education and Home Exercises     Education provided:   - PT plan of care  - Self-Epley instruction  - Basic anatomy/physiology of vestibular system and purpose of canalith repositioning    Written Home Exercises Provided: yes.  Exercises were reviewed and Kenyon was able to demonstrate them prior to the end of the session.  Kenyon demonstrated good  understanding of the education provided.     See EMR under Patient Instructions for exercises provided 5/1/2023.    Assessment     Kenyon is a 82 y.o. male referred to outpatient Physical Therapy with a medical diagnosis of Benign paroxysmal positional vertigo, unspecified laterality [H81.10]. Patient presents with recurrence of known right posterior canal BPPV that he has been treated for at this clinic in the past. He responded well to Epley maneuver x 2 to address vertigo; out of time during evaluation for final repeat [place]-Hallpike to assess full resolution of nystagmus/symptoms. Patient and caregiver instructed on self-Epley maneuver to be used on as needed basis in case of symptom recurrence. Patient would benefit from 1-2 follow up appointments to assess/address presence of persistent BPPV versus full resolution, but may be pursuing home health PT for home mobility assessment which would preclude outpatient PT. Plan to check in with  patient/caregiver later this week to gauge their decision if they would like to return to outpatient PT for follow up assessment/vertigo treatment.    Patient prognosis is Good.   Patient will benefit from skilled outpatient Physical Therapy to address the deficits stated above and in the chart below, provide patient/family education, and to maximize patient's level of independence.     Plan of care discussed with patient: Yes  Patient's spiritual, cultural and educational needs considered and patient is agreeable to the plan of care and goals as stated below:     Anticipated Barriers for therapy: May be pursuing outpatient PT    Medical Necessity is demonstrated by the following  History  Co-morbidities and personal factors that may impact the plan of care Co-morbidities:   Parkinsonism  History of stroke  Strabismus     Personal Factors:   age     high   Examination  Body Structures and Functions, activity limitations and participation restrictions that may impact the plan of care Body Regions:   head    Body Systems:    Vestibular system    Participation Restrictions:   Decreased quality of life secondary to dizziness    Activity limitations:   Learning and applying knowledge  no deficits    General Tasks and Commands  no deficits    Communication  no deficits    Mobility  Bed mobility    Self care  no deficits    Domestic Life  doing house work (cleaning house, washing dishes, laundry)  assisting others    Interactions/Relationships  no deficits    Life Areas  no deficits    Community and Social Life  community life  recreation and leisure         high   Clinical Presentation evolving clinical presentation with changing clinical characteristics moderate   Decision Making/ Complexity Score: moderate     Goals:  Short Term Goals = Long Term Goals: 4 weeks   Patient will display negative [place]-Hallpike and Roll Test bilaterally in order to improve positional dizziness during bed mobility  Patient will improve  Dizziness Functional Status score to >/=55/67 in order to improve self-perception of functional mobility    Plan     Plan of care Certification: 5/1/2023 to 6/2/2023.    Outpatient Physical Therapy 1 times weekly for 4 weeks to include the following interventions: Gait Training, Manual Therapy, Moist Heat/ Ice, Neuromuscular Re-ed, Patient Education, Self Care, Therapeutic Activities, Therapeutic Exercise, and Canalith Repositioning PRN .     BALDEMAR SESAY, PT

## 2023-05-11 DIAGNOSIS — F41.9 ANXIETY: ICD-10-CM

## 2023-05-11 RX ORDER — TAMSULOSIN HYDROCHLORIDE 0.4 MG/1
CAPSULE ORAL
Qty: 90 CAPSULE | Refills: 3 | Status: SHIPPED | OUTPATIENT
Start: 2023-05-11 | End: 2024-03-18 | Stop reason: SDUPTHER

## 2023-05-11 NOTE — TELEPHONE ENCOUNTER
Refill Decision Note   Kenyon Hunt  is requesting a refill authorization.  Brief Assessment and Rationale for Refill:  Approve     Medication Therapy Plan:         Comments:     Note composed:3:16 PM 05/11/2023             Appointments     Last Visit   3/13/2023 Italia Ramírez MD   Next Visit   9/18/2023 Italia Ramírez MD

## 2023-05-11 NOTE — TELEPHONE ENCOUNTER
No care due was identified.  [facility] Embedded Care Due Messages. Reference number: [id number].   5/11/2023 3:05:10 PM CDT

## 2023-05-12 ENCOUNTER — TELEPHONE (OUTPATIENT)
Dept: NEUROLOGY | Facility: CLINIC | Age: 83
End: 2023-05-12
Payer: MEDICARE

## 2023-06-06 ENCOUNTER — TELEPHONE (OUTPATIENT)
Dept: NEUROLOGY | Facility: CLINIC | Age: 83
End: 2023-06-06
Payer: MEDICARE

## 2023-06-06 NOTE — TELEPHONE ENCOUNTER
----- Message from Williams Mo sent at 6/6/2023  3:25 PM CDT -----  Type: RX Refill Request    Who Called: Self     Have you contacted your pharmacy:Yes     Refill or New Rx:Refill     RX Name and Strength: memantine (NAMENDA) 5 MG Tab 60 tablet     Preferred Pharmacy with phone number:[facility] #[id number] - [place], [place] - [address]   Phone: [phone]  Fax:  [phone]    Local or Mail Order: Local     Would the patient rather a call back or a response via [facility]? CALL     Best Call Back Number: [phone] (home)

## 2023-06-06 NOTE — TELEPHONE ENCOUNTER
----- Message from Laurence Mitchell sent at 6/6/2023  3:14 PM CDT -----  Regarding: refill  Can the clinic reply in [facility]: no              Please refill the medication(s) listed below. Please call the patient when the prescription(s) is ready for  at this phone number   [phone]           Medication #1memantine (NAMENDA) 5 MG Tab         Medication #2           Preferred Pharmacy:[id number]  [place] [address]

## 2023-06-06 NOTE — TELEPHONE ENCOUNTER
----- Message from Olvin Doran sent at 6/6/2023  4:56 PM CDT -----  Contact: [phone]  Kenyon Jenningsoniel calling regarding Refills  (message) for # memantine (NAMENDA) 5 MG Tab pt states he called in on yesterday and again today and he still do not have his refill asking if the nurse       [id number] - [address] AT [facility] OF  [address] [place]  [address]  [place] [address]  Phone: [phone] Fax: [phone]

## 2023-06-06 NOTE — TELEPHONE ENCOUNTER
----- Message from Jessie Kaur sent at 6/6/2023  4:40 PM CDT -----  Regarding: Refill  Contact: [phone]  Hi, pt is calling in to get a refill on the memantine (NAMENDA) 5 MG Tab. Pls call the pt to confirm the Rx was sent in at [phone]      [email or web address] #[id number] - [address] AT [facility] OF  [address] [place]  [address]  [place] [address]  Phone: [phone] Fax: [phone]

## 2023-06-08 ENCOUNTER — PATIENT MESSAGE (OUTPATIENT)
Dept: NEUROLOGY | Facility: CLINIC | Age: 83
End: 2023-06-08
Payer: MEDICARE

## 2023-06-08 RX ORDER — MEMANTINE HYDROCHLORIDE 5 MG/1
5 TABLET ORAL 2 TIMES DAILY
Qty: 60 TABLET | Refills: 11 | Status: SHIPPED | OUTPATIENT
Start: 2023-06-08 | End: 2023-06-29 | Stop reason: ALTCHOICE

## 2023-06-16 RX ORDER — LORAZEPAM 0.5 MG/1
TABLET ORAL
Qty: 90 TABLET | Refills: 5 | Status: SHIPPED | OUTPATIENT
Start: 2023-06-16 | End: 2023-06-29 | Stop reason: ALTCHOICE

## 2023-06-16 NOTE — TELEPHONE ENCOUNTER
Care Due:                  Date            Visit Type   Department     Provider  --------------------------------------------------------------------------------                                EP -                              PRIMARY  Last Visit: 03-      CARE ([facility])   None Found     Italia Ramírez                              EP -                              PRIMARY  Next Visit: 09-      CARE ([facility])   None Found     Italia Ramírez                                                            Last  Test          Frequency    Reason                     Performed    Due Date  --------------------------------------------------------------------------------    CMP.........  12 months..  atorvastatin.............  09- 09-    Lipid Panel.  12 months..  atorvastatin.............  09- 09-    [facility] Embedded Care Due Messages. Reference number: [id number].   6/16/2023 11:08:37 AM CDT

## 2023-06-25 ENCOUNTER — HOSPITAL ENCOUNTER (EMERGENCY)
Facility: HOSPITAL | Age: 83
Discharge: HOME | End: 2023-06-25
Attending: EMERGENCY MEDICINE
Payer: MEDICARE

## 2023-06-25 VITALS
DIASTOLIC BLOOD PRESSURE: 91 MMHG | HEART RATE: 60 BPM | SYSTOLIC BLOOD PRESSURE: 192 MMHG | RESPIRATION RATE: 20 BRPM | OXYGEN SATURATION: 95 %

## 2023-06-25 DIAGNOSIS — R20.2 PARESTHESIAS: Primary | ICD-10-CM

## 2023-06-25 DIAGNOSIS — R41.82 AMS (ALTERED MENTAL STATUS): ICD-10-CM

## 2023-06-25 LAB
ALBUMIN SERPL BCP-MCNC: 3.4 G/DL (ref 3.5–5.2)
ALP SERPL-CCNC: 59 U/L (ref 55–135)
ALT SERPL W/O P-5'-P-CCNC: <5 U/L (ref 10–44)
ANION GAP SERPL CALC-SCNC: 11 MMOL/L (ref 8–16)
AST SERPL-CCNC: 16 U/L (ref 10–40)
BASOPHILS # BLD AUTO: 0.04 K/UL (ref 0–0.2)
BASOPHILS NFR BLD: 0.5 % (ref 0–1.9)
BILIRUB SERPL-MCNC: 0.3 MG/DL (ref 0.1–1)
BILIRUB UR QL STRIP: NEGATIVE
BNP SERPL-MCNC: 128 PG/ML (ref 0–99)
BUN SERPL-MCNC: 19 MG/DL (ref 8–23)
CALCIUM SERPL-MCNC: 9.1 MG/DL (ref 8.7–10.5)
CHLORIDE SERPL-SCNC: 106 MMOL/L (ref 95–110)
CLARITY UR REFRACT.AUTO: ABNORMAL
CO2 SERPL-SCNC: 21 MMOL/L (ref 23–29)
COLOR UR AUTO: YELLOW
CREAT SERPL-MCNC: 0.9 MG/DL (ref 0.5–1.4)
DIFFERENTIAL METHOD: ABNORMAL
EOSINOPHIL # BLD AUTO: 0.5 K/UL (ref 0–0.5)
EOSINOPHIL NFR BLD: 6.2 % (ref 0–8)
ERYTHROCYTE [DISTWIDTH] IN BLOOD BY AUTOMATED COUNT: 13.2 % (ref 11.5–14.5)
EST. GFR  (NO RACE VARIABLE): >60 ML/MIN/1.73 M^2
GLUCOSE SERPL-MCNC: 100 MG/DL (ref 70–110)
GLUCOSE UR QL STRIP: NEGATIVE
HCT VFR BLD AUTO: 39.1 % (ref 40–54)
HGB BLD-MCNC: 13.2 G/DL (ref 14–18)
HGB UR QL STRIP: NEGATIVE
IMM GRANULOCYTES # BLD AUTO: 0.02 K/UL (ref 0–0.04)
IMM GRANULOCYTES NFR BLD AUTO: 0.3 % (ref 0–0.5)
KETONES UR QL STRIP: NEGATIVE
LEUKOCYTE ESTERASE UR QL STRIP: NEGATIVE
LYMPHOCYTES # BLD AUTO: 1.7 K/UL (ref 1–4.8)
LYMPHOCYTES NFR BLD: 20.9 % (ref 18–48)
MCH RBC QN AUTO: 31.3 PG (ref 27–31)
MCHC RBC AUTO-ENTMCNC: 33.8 G/DL (ref 32–36)
MCV RBC AUTO: 93 FL (ref 82–98)
MONOCYTES # BLD AUTO: 0.6 K/UL (ref 0.3–1)
MONOCYTES NFR BLD: 7.5 % (ref 4–15)
NEUTROPHILS # BLD AUTO: 5.1 K/UL (ref 1.8–7.7)
NEUTROPHILS NFR BLD: 64.6 % (ref 38–73)
NITRITE UR QL STRIP: NEGATIVE
NRBC BLD-RTO: 0 /100 WBC
PH UR STRIP: 8 [PH] (ref 5–8)
PLATELET # BLD AUTO: 217 K/UL (ref 150–450)
PMV BLD AUTO: 9.6 FL (ref 9.2–12.9)
POTASSIUM SERPL-SCNC: 3.9 MMOL/L (ref 3.5–5.1)
PROT SERPL-MCNC: 6.8 G/DL (ref 6–8.4)
PROT UR QL STRIP: NEGATIVE
RBC # BLD AUTO: 4.22 M/UL (ref 4.6–6.2)
SODIUM SERPL-SCNC: 138 MMOL/L (ref 136–145)
SP GR UR STRIP: 1.01 (ref 1–1.03)
TROPONIN I SERPL DL<=0.01 NG/ML-MCNC: <0.006 NG/ML (ref 0–0.03)
URN SPEC COLLECT METH UR: ABNORMAL
WBC # BLD AUTO: 7.9 K/UL (ref 3.9–12.7)

## 2023-06-25 PROCEDURE — 84484 ASSAY OF TROPONIN QUANT: CPT | Performed by: EMERGENCY MEDICINE

## 2023-06-25 PROCEDURE — 80053 COMPREHEN METABOLIC PANEL: CPT | Performed by: EMERGENCY MEDICINE

## 2023-06-25 PROCEDURE — 83880 ASSAY OF NATRIURETIC PEPTIDE: CPT | Performed by: EMERGENCY MEDICINE

## 2023-06-25 PROCEDURE — 93010 EKG 12-LEAD: ICD-10-PCS | Mod: ,,, | Performed by: INTERNAL MEDICINE

## 2023-06-25 PROCEDURE — 93005 ELECTROCARDIOGRAM TRACING: CPT

## 2023-06-25 PROCEDURE — 85025 COMPLETE CBC W/AUTO DIFF WBC: CPT | Performed by: EMERGENCY MEDICINE

## 2023-06-25 PROCEDURE — 81003 URINALYSIS AUTO W/O SCOPE: CPT | Performed by: EMERGENCY MEDICINE

## 2023-06-25 PROCEDURE — 93010 ELECTROCARDIOGRAM REPORT: CPT | Mod: ,,, | Performed by: INTERNAL MEDICINE

## 2023-06-25 PROCEDURE — 99285 EMERGENCY DEPT VISIT HI MDM: CPT | Mod: 25

## 2023-06-25 NOTE — ED TRIAGE NOTES
Pt arrives to ED via EMS c/o of bilateral numbness to upper and lower extremities since yesterday. Pt states he is being treated for spinal stenosis and also states he is under the care of neurologist for frequent falls. Pt states he is also taking parkinson's medication. Pt states the numbness started last night and has had this numbness off and on for several years that comes and goes. Pt states having a slight HA. Pt denies CP or SOB.

## 2023-06-25 NOTE — ED NOTES
MD Agudelo notified that MRI is unable to preform the scans at this time due to the pt's pacemaker.

## 2023-06-25 NOTE — ED PROVIDER NOTES
Encounter Date: 6/25/2023       History     Chief Complaint   Patient presents with    Numbness     Pt is experiencing numbness in the R hand and weakness on the L leg that started at 10pm. Hx spinal stenosis.      82-year-old gentleman with past medical history including Parkinson's disease presents for evaluation of altered mental status.  Patient reports that he has a pins and needle sensation in his extremities.  Initially reports that it was only in the right arm.  During the examination, he then stated that he had it also in the right leg from the toes to the knee.  He then developed it in the left ankle.  He then developed it in the left hand.  He said this has been ongoing for some time.    Daughter is at bedside and she reports the reason for the emergency department visit is that this afternoon another sibling called him and he seemed confused.  He was unable to answer their questions.  They do not report that he had any slurred speech.  This occurred at approximately 1:45 p.m..  Approximately 15 minutes later they were at his house and they report that he seemed normal.    The history is provided by the patient and a relative. The history is limited by the condition of the patient.   Review of patient's allergies indicates:   Allergen Reactions    Carbamazepine Rash     Past Medical History:   Diagnosis Date    Strabismus      Past Surgical History:   Procedure Laterality Date    CATARACT EXTRACTION W/  INTRAOCULAR LENS IMPLANT Bilateral     pace maker      SHOULDER SURGERY       Family History   Problem Relation Age of Onset    Amblyopia Neg Hx     Blindness Neg Hx     Cataracts Neg Hx     Glaucoma Neg Hx     Macular degeneration Neg Hx     Retinal detachment Neg Hx     Strabismus Neg Hx      Social History     Tobacco Use    Smoking status: Former    Smokeless tobacco: Never   Substance Use Topics    Alcohol use: Yes     Review of Systems    Physical Exam     Initial Vitals [06/25/23 1448]   BP Pulse  Resp Temp SpO2   (!) 175/108 66 16 -- 96 %      MAP       --         Physical Exam    Nursing note and vitals reviewed.  Constitutional: He appears well-developed and well-nourished.   HENT:   Head: Normocephalic.   Eyes: Conjunctivae are normal.   Neck: Trachea normal.   Cardiovascular:  Normal rate, regular rhythm, intact distal pulses and normal pulses.           Pulmonary/Chest: Breath sounds normal. No tachypnea. No respiratory distress.   Abdominal: Abdomen is soft. There is no abdominal tenderness.   Musculoskeletal:         General: Normal range of motion.     Neurological: He is alert and oriented to person, place, and time. He has normal strength. GCS score is 15. GCS eye subscore is 4. GCS verbal subscore is 5. GCS motor subscore is 6.   Sensation intact, normal strength throughout, clear speech, not confused   Skin: Skin is warm.       ED Course   Procedures  Labs Reviewed   CBC W/ AUTO DIFFERENTIAL - Abnormal; Notable for the following components:       Result Value    RBC 4.22 (*)     Hemoglobin 13.2 (*)     Hematocrit 39.1 (*)     MCH 31.3 (*)     All other components within normal limits   COMPREHENSIVE METABOLIC PANEL - Abnormal; Notable for the following components:    CO2 21 (*)     Albumin 3.4 (*)     ALT <5 (*)     All other components within normal limits   B-TYPE NATRIURETIC PEPTIDE - Abnormal; Notable for the following components:     (*)     All other components within normal limits   URINALYSIS, REFLEX TO URINE CULTURE - Abnormal; Notable for the following components:    Appearance, UA Hazy (*)     All other components within normal limits    Narrative:     Specimen Source->Urine   TROPONIN I          Imaging Results              CT Head Without Contrast (Final result)  Result time 06/25/23 20:15:22      Final result by Cesar Ross MD (06/25/23 20:15:22)                   Impression:      No CT findings of large territorial infarction.  Additional evaluation with MRI of the brain,  as clinically warranted.    Changes of chronic vessel ischemic disease and cerebral volume loss.      Electronically signed by: Cesar Ross MD  Date:    06/25/2023  Time:    20:15               Narrative:    EXAMINATION:  CT HEAD WITHOUT CONTRAST    CLINICAL HISTORY:  Neuro deficit, acute, stroke suspected;    TECHNIQUE:  Low dose axial images were obtained through the head.  Coronal and sagittal reformations were also performed. Contrast was not administered.    COMPARISON:  MRI brain dated 05/02/2022.    FINDINGS:  The subcutaneous tissues are unremarkable.  The bony calvarium is intact.  The paranasal sinuses are unremarkable.  The mastoid air cells are clear.  There are postoperative changes in the globes.    The craniocervical junction is intact.  The midline structures are unremarkable.  The ventricles and sulci are prominent, consistent cerebral volume loss.  There are extensive hypodensities within the periventricular and subcortical white matter.  There are prominent perivascular spaces within the basal ganglia.  There is an old lacunar type infarction in the genu of the internal capsule on the right.  There are no extra-axial fluid collections.  There is no evidence of mass effect.  There are calcifications of the skull base vessels.  There is no dense vessel sign.                                       Medications - No data to display  Medical Decision Making:   Initial Assessment:   Evaluation of altered mental status  Differential Diagnosis:   TIA, dementia, medication interaction  Clinical Tests:   Lab Tests: Ordered and Reviewed  Radiological Study: Ordered and Reviewed  ED Management:  Patient has a normal neurologic exam at this time.  He complains about the sensory change however this seems to be a chronic problem for him.  He has no gross sensory deficit.  No indication for an acute stroke code.  Will get lab work and imaging.           ED Course as of 06/25/23 2030   Sun Jun 25, 2023   6615  Unable to Get MRI due to pacemaker. Changed to CT scan due to low suspicion for acute CVA [HS]   1643 EKG, Per my independent interpretation,  Paced 60  [HS]   1734 Blood work unremarkable.  No signs of electrolyte derangement ,  cardiac dysfunction, UTI [HS]   2027 CT reviewed, no signs of acute infarct or other abnormalities.  Discussed findings with the patient in his daughter.  At this time there is no emergent condition identified, I feel the patient is stable for discharge home.  Advised to follow up with the neurologist was intermittent peripheral numbness. [HS]      ED Course User Index  [HS] Nitesh Agudelo MD                   Clinical Impression:   Final diagnoses:  [R41.82] AMS (altered mental status)  [R20.2] Paresthesias (Primary)        ED Disposition Condition    Discharge Stable          ED Prescriptions    None       Follow-up Information       Follow up With Specialties Details Why Contact Info Additional Information    [facility] - Neurology 7th Fl Neurology  Call to schedule an appointment to follow up [address]  [facility] [address] [phone] [facility] [place] - [facility], 7th Floor Please park in [facility] and take Clinic elevator    Italia Ramírez MD Internal Medicine   [address] [facility]  [address]  [facility] [address]  [phone]                Nitesh Agudelo MD  06/25/23 2030

## 2023-06-26 ENCOUNTER — TELEPHONE (OUTPATIENT)
Dept: NEUROLOGY | Facility: CLINIC | Age: 83
End: 2023-06-26
Payer: MEDICARE

## 2023-06-26 NOTE — ED NOTES
Ambulatory wheelchair, wheelchair to exit. No needs observed or expressed. All discharge paperwork in hand upon exit. Exit with family without difficulty. AAOx4

## 2023-06-27 ENCOUNTER — PATIENT MESSAGE (OUTPATIENT)
Dept: NEUROLOGY | Facility: CLINIC | Age: 83
End: 2023-06-27
Payer: MEDICARE

## 2023-06-27 ENCOUNTER — TELEPHONE (OUTPATIENT)
Dept: NEUROLOGY | Facility: CLINIC | Age: 83
End: 2023-06-27
Payer: MEDICARE

## 2023-06-27 NOTE — TELEPHONE ENCOUNTER
----- Message from Kayley Saunders sent at 6/27/2023  3:31 PM CDT -----  Regarding: Orders for Home Health need to be sent to [facility]  Contact: @[phone]  Orders for Home Health need to be sent to [facility], please call daughter (Samia) @[phone], making sure orders are related to actual needs of patient, please call and advise.

## 2023-06-29 ENCOUNTER — OFFICE VISIT (OUTPATIENT)
Dept: NEUROLOGY | Facility: CLINIC | Age: 83
End: 2023-06-29
Payer: MEDICARE

## 2023-06-29 VITALS
HEART RATE: 63 BPM | SYSTOLIC BLOOD PRESSURE: 121 MMHG | DIASTOLIC BLOOD PRESSURE: 68 MMHG | BODY MASS INDEX: 28.12 KG/M2 | WEIGHT: 169 LBS

## 2023-06-29 DIAGNOSIS — M54.50 CHRONIC BILATERAL LOW BACK PAIN WITHOUT SCIATICA: ICD-10-CM

## 2023-06-29 DIAGNOSIS — R42 DIZZINESS AND GIDDINESS: Primary | ICD-10-CM

## 2023-06-29 DIAGNOSIS — G62.9 POLYNEUROPATHY: ICD-10-CM

## 2023-06-29 DIAGNOSIS — R26.81 UNSTEADY GAIT: ICD-10-CM

## 2023-06-29 DIAGNOSIS — G89.29 CHRONIC BILATERAL LOW BACK PAIN WITHOUT SCIATICA: ICD-10-CM

## 2023-06-29 DIAGNOSIS — M54.12 CERVICAL RADICULOPATHY: ICD-10-CM

## 2023-06-29 DIAGNOSIS — C61 PROSTATE CA: ICD-10-CM

## 2023-06-29 PROCEDURE — 3288F FALL RISK ASSESSMENT DOCD: CPT | Mod: CPTII,S$GLB,, | Performed by: PSYCHIATRY & NEUROLOGY

## 2023-06-29 PROCEDURE — 99215 PR OFFICE/OUTPT VISIT, EST, LEVL V, 40-54 MIN: ICD-10-PCS | Mod: S$GLB,,, | Performed by: PSYCHIATRY & NEUROLOGY

## 2023-06-29 PROCEDURE — 99215 OFFICE O/P EST HI 40 MIN: CPT | Mod: S$GLB,,, | Performed by: PSYCHIATRY & NEUROLOGY

## 2023-06-29 PROCEDURE — 1160F PR REVIEW ALL MEDS BY PRESCRIBER/CLIN PHARMACIST DOCUMENTED: ICD-10-PCS | Mod: CPTII,S$GLB,, | Performed by: PSYCHIATRY & NEUROLOGY

## 2023-06-29 PROCEDURE — 1126F PR PAIN SEVERITY QUANTIFIED, NO PAIN PRESENT: ICD-10-PCS | Mod: CPTII,S$GLB,, | Performed by: PSYCHIATRY & NEUROLOGY

## 2023-06-29 PROCEDURE — 3074F PR MOST RECENT SYSTOLIC BLOOD PRESSURE < 130 MM HG: ICD-10-PCS | Mod: CPTII,S$GLB,, | Performed by: PSYCHIATRY & NEUROLOGY

## 2023-06-29 PROCEDURE — 3288F PR FALLS RISK ASSESSMENT DOCUMENTED: ICD-10-PCS | Mod: CPTII,S$GLB,, | Performed by: PSYCHIATRY & NEUROLOGY

## 2023-06-29 PROCEDURE — 3074F SYST BP LT 130 MM HG: CPT | Mod: CPTII,S$GLB,, | Performed by: PSYCHIATRY & NEUROLOGY

## 2023-06-29 PROCEDURE — 99999 PR PBB SHADOW E&M-EST. PATIENT-LVL IV: ICD-10-PCS | Mod: PBBFAC,,, | Performed by: PSYCHIATRY & NEUROLOGY

## 2023-06-29 PROCEDURE — 1160F RVW MEDS BY RX/DR IN RCRD: CPT | Mod: CPTII,S$GLB,, | Performed by: PSYCHIATRY & NEUROLOGY

## 2023-06-29 PROCEDURE — 3078F PR MOST RECENT DIASTOLIC BLOOD PRESSURE < 80 MM HG: ICD-10-PCS | Mod: CPTII,S$GLB,, | Performed by: PSYCHIATRY & NEUROLOGY

## 2023-06-29 PROCEDURE — 99999 PR PBB SHADOW E&M-EST. PATIENT-LVL IV: CPT | Mod: PBBFAC,,, | Performed by: PSYCHIATRY & NEUROLOGY

## 2023-06-29 PROCEDURE — 3078F DIAST BP <80 MM HG: CPT | Mod: CPTII,S$GLB,, | Performed by: PSYCHIATRY & NEUROLOGY

## 2023-06-29 PROCEDURE — 1159F PR MEDICATION LIST DOCUMENTED IN MEDICAL RECORD: ICD-10-PCS | Mod: CPTII,S$GLB,, | Performed by: PSYCHIATRY & NEUROLOGY

## 2023-06-29 PROCEDURE — 1159F MED LIST DOCD IN RCRD: CPT | Mod: CPTII,S$GLB,, | Performed by: PSYCHIATRY & NEUROLOGY

## 2023-06-29 PROCEDURE — 1126F AMNT PAIN NOTED NONE PRSNT: CPT | Mod: CPTII,S$GLB,, | Performed by: PSYCHIATRY & NEUROLOGY

## 2023-06-29 PROCEDURE — 1101F PR PT FALLS ASSESS DOC 0-1 FALLS W/OUT INJ PAST YR: ICD-10-PCS | Mod: CPTII,S$GLB,, | Performed by: PSYCHIATRY & NEUROLOGY

## 2023-06-29 PROCEDURE — 1101F PT FALLS ASSESS-DOCD LE1/YR: CPT | Mod: CPTII,S$GLB,, | Performed by: PSYCHIATRY & NEUROLOGY

## 2023-06-29 NOTE — PROGRESS NOTES
[facility] - NEUROLOGY 7TH [place] [facility], [place]    Date: 6/29/23  Patient Name: Kenyon Hunt   MRN: [id number]   Referring Provider: No ref. provider found    Thank you so much No ref. provider found for your patient referral to Neuromuscular team at [facility]. We take pride in our care coordination and look forward to your feedback and questions.    Assessment:   Kenyon Hunt is a 82 y.o. male is a very pleasant patient with multiple neurological issues impairing his mobility with chronic pain including evidence of sensory predominant polyneuropathy, chronic backache beside dizziness.  I have requested home health as per request of the family.  NCS/EMG can be done for further evaluation of neuropathy, myopathy and cervical radiculopathy in terms of diagnosis, severity and characteristics.  Peripheral neuropathy workup has already been completed in the past.    Additionally he has evidence of pain on neck movement consistent with probable cervical radiculopathy so MRI cervical spine can be done.    I discussed about fall precautions and need for Physiotherapy. I addressed his complaints. I provided information about fall precautions and healthy lifestyle.    I would wish him very best for improvement/recovery in his condition.    Future direction based on feedback:    Plan:     Problem List Items Addressed This Visit          Neuro    Polyneuropathy    Relevant Orders    EMG W/ ULTRASOUND AND NERVE CONDUCTION TEST 2 Extremities    Ambulatory referral/consult to Home Health       Orthopedic    Chronic bilateral back pain    Relevant Orders    MRI Cervical Spine Without Contrast       Other    Unsteady gait    Relevant Orders    EMG W/ ULTRASOUND AND NERVE CONDUCTION TEST 2 Extremities    Ambulatory referral/consult to Home Health     Other Visit Diagnoses       Dizziness and giddiness    -  Primary    Relevant Orders    MRI Brain Without Contrast     Ambulatory referral/consult to Home Health    Cervical radiculopathy        Relevant Orders    EMG W/ ULTRASOUND AND NERVE CONDUCTION TEST 2 Extremities    Prostate CA        Relevant Orders    US Abdomen Limited            Oneyda Holbrook MD    This evaluation was completed in >60  Minutes over 50% of the time spent on education & counseling. This includes face to face time and non-face to face time preparing to see the patient (eg, review of tests), obtaining and/or reviewing separately obtained history, documenting clinical information in the electronic or other health record, independently interpreting results and communicating results to the patient/family/caregiver, or care coordinator.    Based on our encounter today, my overall Medical Decision Making is a Level 5 because of High = 1 or more chronic illnesses with severe exacerbation, progression, or side effects of treatment; 1 acute or chronic illness or injury that poses a threat to life or bodily function and High = High risk of morbidity from additional diagnostic testing or treatment (e.g. Drug therapy requiring intensive, Monitoring for toxicity, Decision regarding elective major surgery with identified patient or procedure risk factors, Decision regarding emergency major surgery, Decision regarding hospitalization, Decision not to resuscitate or to deescalate care because of poor prognosis) based on Number of Problems or Complexity of Problems and Risk of Complications and/or Morbidity     Patient note was created using MModal Dictation.  Any errors in syntax or even information may not have been identified and edited on initial review prior to signing this note.    Details provided by:    Patient  Family- Daughter and Daughter in Law    Chief complaint today:  Left arm numbness and tingling    History of Present Illness:      Mr. Kenyon Hunt is a 82 y.o. male presenting for evaluation of  left arm numbness and tingling.     The detail was  confirmed with the patient who mentioned numbness and tingling in the lateral side of hand and fingers for more than 1 year which has been progressively worsening over time.  He has also noted feeling of swelling in the legs when actually there is no swelling.  He has some confusion in January 2023.  Feels that he has been falling more than usual for past 1 week which he initially noticed in February 2020.  There is history of urine incontinence since February 2020.  He usually gets up in the night to go to the restroom. He tends to fall backwards.      He has medication adjustment but still there is no confirmed diagnosis for his condition.  He mentioned some memory issues where he forgets what he said with predominant short term memory issues for the past few months.      He has been getting physical therapy for the past 6 weeks for stumbling and falls.  He also mentioned right rotator cuff surgery in the past. He has epidural in the past.  He used to play baseball in [employer].      There is no history of smoking and he does not drink alcohol.  He had habit of heavy drinking for a few years in his 70s. He lives with his wife who is the primary caretaker for him.  They would like to have home health services by [facility]. His daughter works in [employer] as medical assistant in center for clotting and bleeding disorders.    Chart Review:  Recent ED visit on 06/25/2023.    82-year-old gentleman with past medical history including Parkinson's disease presents for evaluation of altered mental status.  Patient reports that he has a pins and needle sensation in his extremities.  Initially reports that it was only in the right arm.  During the examination, he then stated that he had it also in the right leg from the toes to the knee.  He then developed it in the left ankle.  He then developed it in the left hand.  He said this has been ongoing for some time.   Daughter is at bedside and she reports the reason for the  emergency department visit is that this afternoon another sibling called him and he seemed confused.  He was unable to answer their questions.  They do not report that he had any slurred speech.  This occurred at approximately 1:45 p.m..  Approximately 15 minutes later they were at his house and they report that he seemed normal.    Pertinent work up based on chart review for current condition:   high   Comprehensive metabolic panel normal   TSH 3.46   Creatinine 1.0 normal   Vitamin E 1649 normal   RPR normal   Serum lead normal   Vitamin B6 --20 normal   Vitamin B1 --110 normal   Vitamin B12-- 442 normal   CK 84 normal     2/2022 EMG without evidence of neuropathy per patient.  No records found in the chart.      CT scan head on 06/25/2023.    No CT findings of large territorial infarction.  Additional evaluation with MRI of the brain, as clinically warranted    MRI brain and MRA neck on 05/02/2022.    Moderate chronic microvascular ischemic change and cerebral volume loss.   Prominent perivascular spaces throughout bilateral basal ganglia.   No evidence of acute infarct or hemorrhage.   MR angiogram of the head and neck appears within normal limits, allowing for some artifact.  No evidence of high-grade stenosis or large vessel occlusion    Last CBC Results:   Lab Results   Component Value Date    WBC 7.90 06/25/2023    HGB 13.2 (L) 06/25/2023    HCT 39.1 (L) 06/25/2023     06/25/2023       Last CMP Results  Lab Results   Component Value Date     06/25/2023    K 3.9 06/25/2023     06/25/2023    CO2 21 (L) 06/25/2023    BUN 19 06/25/2023    CREATININE 0.9 06/25/2023    CALCIUM 9.1 06/25/2023    ALBUMIN 3.4 (L) 06/25/2023    AST 16 06/25/2023    ALT <5 (L) 06/25/2023       Last Lipids  Lab Results   Component Value Date    CHOL 140 09/09/2022    TRIG 92 09/09/2022    HDL 58 09/09/2022    LDLCALC 64 09/09/2022       Last A1C  No results found for: HGBA1C    Last TSH  Lab Results   Component  Value Date    TSH 3.46 09/09/2022       MRI 06/29/2023    Review of Systems:  12 system review of systems is negative except for the symptoms mentioned in HPI.       PAST MEDICAL HISTORY:  Past Medical History:   Diagnosis Date    Strabismus        PAST SURGICAL HISTORY:  Past Surgical History:   Procedure Laterality Date    CATARACT EXTRACTION W/  INTRAOCULAR LENS IMPLANT Bilateral     pace maker      SHOULDER SURGERY         CURRENT MEDS:  I have reconciled the patient's home medications and discharge medications with the patient/family. I have updated all changes.  Refer to After-Visit Medication List.    Current Outpatient Medications   Medication Sig Dispense Refill    ascorbic acid, vitamin C, (VITAMIN C) 1000 MG tablet Take 1,000 mg by mouth.      aspirin (ECOTRIN) 81 MG EC tablet Take 1 tablet (81 mg total) by mouth once daily. 90 tablet 3    atorvastatin (LIPITOR) 10 MG tablet Take 1 tablet (10 mg total) by mouth every other day. 45 tablet 3    buPROPion (WELLBUTRIN SR) 200 MG SR12 Take 1 tablet (200 mg total) by mouth 2 (two) times daily. 180 tablet 3    calcium carbonate (OS-FELICITY) 600 mg calcium (1,500 mg) Tab Take 600 mg by mouth 2 (two) times a day.      carbidopa-levodopa  mg (SINEMET)  mg per tablet Take 2 tablets by mouth 3 (three) times daily. 180 tablet 11    coenzyme Q10 100 mg capsule Take 100 mg by mouth.      ergocalciferol, vitamin D2, (VITAMIN D ORAL) Take 1,000 Int'l Units by mouth 2 (two) times a day.      EScitalopram oxalate (LEXAPRO) 20 MG tablet Take 1 tablet (20 mg total) by mouth once daily. 90 tablet 3    fluticasone propionate (FLONASE) 50 mcg/actuation nasal spray 1 spray (50 mcg total) by Each Nostril route once daily. 32 g 11    irbesartan (AVAPRO) 150 MG tablet Take 150 mg by mouth once daily.      LORazepam (ATIVAN) 0.5 MG tablet TAKE 1 TABLET(0.5 MG) BY MOUTH EVERY 8 HOURS AS NEEDED FOR ANXIETY 90 tablet 5    melatonin 5 mg Cap Take 1 capsule by mouth every evening.       melatonin-pyridoxine HCl, B6, 5-10 mg TbIE Take 5 mg by mouth.      memantine (NAMENDA) 5 MG Tab Take 1 tablet (5 mg total) by mouth 2 (two) times daily. 60 tablet 11    metoprolol tartrate (LOPRESSOR) 25 MG tablet Take 25 mg by mouth 2 (two) times a day.      MILK THISTLE ORAL Take by mouth.      multivit with minerals/lutein (MULTIVITAMIN 50 PLUS ORAL) Take by mouth.      omega-3 fatty acids 1,000 mg Cap Take 2 g by mouth 2 (two) times a day.      pantoprazole (PROTONIX) 20 MG tablet Take 20 mg by mouth once daily.      POTASSIUM CHLORIDE ORAL Take 50 mg by mouth.      POTASSIUM ORAL Take by mouth.      tamsulosin (FLOMAX) 0.4 mg Cap TAKE 1 CAPSULE(0.4 MG) BY MOUTH EVERY DAY 90 capsule 3    TURMERIC ORAL Take by mouth.      vitamin D (VITAMIN D3) 1000 units Tab Take 1,000 Units by mouth 2 (two) times a day.      zinc 50 mg Tab Take by mouth.       No current facility-administered medications for this visit.       ALLERGIES:  Review of patient's allergies indicates:   Allergen Reactions    Carbamazepine Rash       FAMILY HISTORY:  Family History   Problem Relation Age of Onset    Amblyopia Neg Hx     Blindness Neg Hx     Cataracts Neg Hx     Glaucoma Neg Hx     Macular degeneration Neg Hx     Retinal detachment Neg Hx     Strabismus Neg Hx        SOCIAL HISTORY:  Social History     Tobacco Use    Smoking status: Former    Smokeless tobacco: Never   Substance Use Topics    Alcohol use: Yes         Objective:     Vitals:    06/29/23 1305   BP: 121/68   Pulse: 63   Weight: 76.6 kg (168 lb 15.7 oz)     Wt Readings from Last 3 Encounters:   06/29/23 1305 76.6 kg (168 lb 15.7 oz)   03/13/23 1459 76.3 kg (168 lb 3.4 oz)   02/22/23 1551 75 kg (165 lb 5.5 oz)     Body mass index is 28.12 kg/m².       Eyes: no tearing, discharge, no erythema   ENT: moist mucous membranes of the oral cavity, nares patent    Neck: Supple, full range of motion  Cardiovascular: Warm and well perfused, pulses equal and symmetrical  Lungs:  Normal work of breathing, normal chest wall excursions  Skin: No rash, lesions, or breakdown on exposed skin  Psychiatry: Mood and affect are appropriate   Extremeties: No cyanosis, clubbing or edema.    GENERAL/CONSTITUTIONAL:    -Well appearing; well nourished  -Bilateral moderate pedal edema.    -He had left little trigger finger.      HIGHER INTEGRATIVE FUNCTIONS:   -Attention & concentration: Normal   -Orientation: Oriented to person, place & time  -Memory: Normal  -Language: Normal   -Fund of Knowledge: Normal     CRANIAL NERVES:   -CN 2: Visual fields full  -CN 2,3: PERRL  -CN 3,4,6: EOMI  -CN 5: Facial sensation intact bilaterally  -CN 7: Facial strength/movement intact bilaterally  -CN 8: Hearing normal bilaterally  -CN 9,10: Palate elevates symmetrically  -CN 11: Normal shoulder shrug and head turn  -CN 12: Tongue protrudes midline     MOTOR:   -Tone: normal in upper and lower extremities  -UE/LE motor:       Upper Ext Right Left Lower Ext Right Left   Shoulder Abd 5 5 Hip flexion 4 3   Elbow flexion 5 5 Knee extension 5 5   Elbow extension 5 5 Knee flexion 5 5   Fingers abduction 5 5 Ankle dorsiflexion 5 5   Wrist extension   Ankle plantar flexion     Wrist flexion   Great toe dorsiflexion     Finger extension   Thigh adduction     Finger flexion   Thigh abduction     Thumb abduction            REFLEXES:      R L  R L   Triceps 2 2 Knee 2 2   Biceps 2 2 Ankle 2 2   BR 2 2        -Flexor plantar reflex bilaterally     SENSATION:   -Vibration sense is impaired up to knees bilaterally in lower extremities and up to elbows in upper extremities.      REFLEXES:   -2/4 upper and lower extremities bilaterally  -Flexor plantar reflex bilaterally    COORDINATION:   -FNF normal bilaterally    GAIT:   -came in wheelchair  -There is no pain on neck movements.      Scheduled Follow-up :  Future Appointments   Date Time Provider Department Center   9/18/2023  2:00 PM Italia Ramírez MD [facility] [place]       After  Visit Medication List :     Medication List            Accurate as of June 29, 2023  1:09 PM. If you have any questions, ask your nurse or doctor.                CONTINUE taking these medications      ascorbic acid (vitamin C) 1000 MG tablet  Commonly known as: VITAMIN C     aspirin 81 MG EC tablet  Commonly known as: ECOTRIN  Take 1 tablet (81 mg total) by mouth once daily.     atorvastatin 10 MG tablet  Commonly known as: LIPITOR  Take 1 tablet (10 mg total) by mouth every other day.     buPROPion 200 MG Sr12  Commonly known as: WELLBUTRIN SR  Take 1 tablet (200 mg total) by mouth 2 (two) times daily.     calcium carbonate 600 mg calcium (1,500 mg) Tab  Commonly known as: OS-FELICITY     carbidopa-levodopa  mg  mg per tablet  Commonly known as: SINEMET  Take 2 tablets by mouth 3 (three) times daily.     coenzyme Q10 100 mg capsule     EScitalopram oxalate 20 MG tablet  Commonly known as: LEXAPRO  Take 1 tablet (20 mg total) by mouth once daily.     fluticasone propionate 50 mcg/actuation nasal spray  Commonly known as: FLONASE  1 spray (50 mcg total) by Each Nostril route once daily.     irbesartan 150 MG tablet  Commonly known as: AVAPRO     LORazepam 0.5 MG tablet  Commonly known as: ATIVAN  TAKE 1 TABLET(0.5 MG) BY MOUTH EVERY 8 HOURS AS NEEDED FOR ANXIETY     melatonin 5 mg Cap     melatonin-pyridoxine HCl (B6) 5-10 mg Tbie     memantine 5 MG Tab  Commonly known as: NAMENDA  Take 1 tablet (5 mg total) by mouth 2 (two) times daily.     metoprolol tartrate 25 MG tablet  Commonly known as: LOPRESSOR     MILK THISTLE ORAL     MULTIVITAMIN 50 PLUS ORAL     omega-3 fatty acids 1,000 mg Cap     pantoprazole 20 MG tablet  Commonly known as: PROTONIX     POTASSIUM CHLORIDE ORAL     POTASSIUM ORAL     tamsulosin 0.4 mg Cap  Commonly known as: FLOMAX  TAKE 1 CAPSULE(0.4 MG) BY MOUTH EVERY DAY     TURMERIC ORAL     vitamin D 1000 units Tab  Commonly known as: VITAMIN D3     VITAMIN D ORAL     zinc 50 mg Tab               Signing Physician:          Oneyda Holbrook MD  , [employer] [facility] [employer]  Neurology Consultant. [facility].   [address],  [facility]. [address]th [place] [address].

## 2023-07-10 ENCOUNTER — TELEPHONE (OUTPATIENT)
Dept: NEUROLOGY | Facility: CLINIC | Age: 83
End: 2023-07-10
Payer: MEDICARE

## 2023-07-11 ENCOUNTER — TELEPHONE (OUTPATIENT)
Dept: NEUROLOGY | Facility: CLINIC | Age: 83
End: 2023-07-11
Payer: MEDICARE

## 2023-07-11 ENCOUNTER — PATIENT MESSAGE (OUTPATIENT)
Dept: NEUROLOGY | Facility: CLINIC | Age: 83
End: 2023-07-11
Payer: MEDICARE

## 2023-07-12 ENCOUNTER — PATIENT MESSAGE (OUTPATIENT)
Dept: NEUROLOGY | Facility: CLINIC | Age: 83
End: 2023-07-12
Payer: MEDICARE

## 2023-07-14 ENCOUNTER — PATIENT MESSAGE (OUTPATIENT)
Dept: NEUROLOGY | Facility: CLINIC | Age: 83
End: 2023-07-14
Payer: MEDICARE

## 2023-07-14 DIAGNOSIS — Z95.0 PACEMAKER: Primary | ICD-10-CM

## 2023-07-20 ENCOUNTER — DOCUMENTATION ONLY (OUTPATIENT)
Dept: CARDIOLOGY | Facility: HOSPITAL | Age: 83
End: 2023-07-20
Payer: MEDICARE

## 2023-07-20 NOTE — PROGRESS NOTES
Received a call/message from Fernanda in the MRI Department in relation to this patient needing to be scheduled for a MRI and has a Medtronic Pacemaker.  Patient's Device is MRI compatible/conditional.  To meet protocol the Pacemaker/ICD must have been implanted no less than 6 weeks prior to scheduled date of Scan.  ICD/PPM and leads must be from same .  MRI informed ordering MD must input a Cardiac Device Check in clinic and hospital order, patient must have an xray within 6 months or less prior to MRI reprogramming.  Chest xray to be reviewed by Radiologist.     MRI is scheduled on July 28 at 12:00 p.m.   Medtronic Rep has agreed to be available for the MRI.  Patients device is not followed here. Please call [phone] for parameters after interrogation.    Medtronic pacemaker [place] XT DR MRI [id number] Serial# [id number]   Atrial Medtronic 5076 Capsure Fix Novus MRI  Serial# [id number]   RV Medtronic  5076 Capsure Fix Novus Mri Serial# [id number]   Implanted Feb-

## 2023-07-21 ENCOUNTER — TELEPHONE (OUTPATIENT)
Dept: NEUROLOGY | Facility: CLINIC | Age: 83
End: 2023-07-21
Payer: MEDICARE

## 2023-07-21 NOTE — TELEPHONE ENCOUNTER
[facility] Home Health Authorization    Medical Necessity Form Completed  -Physical Therapy indicated    Home Health Referral   -ordered by Dr. Holbrook    Order faxed to [facility] by LATISHA Morillo  Fax: [phone]          Angel Hartley RN, BSN, BS  [facility] Clinical Care Coordinator  [phone]

## 2023-07-28 ENCOUNTER — HOSPITAL ENCOUNTER (OUTPATIENT)
Dept: RADIOLOGY | Facility: HOSPITAL | Age: 83
Discharge: HOME | End: 2023-07-28
Attending: PSYCHIATRY & NEUROLOGY
Payer: MEDICARE

## 2023-07-28 ENCOUNTER — TELEPHONE (OUTPATIENT)
Dept: PRIMARY CARE CLINIC | Facility: CLINIC | Age: 83
End: 2023-07-28
Payer: MEDICARE

## 2023-07-28 VITALS — HEART RATE: 80 BPM

## 2023-07-28 DIAGNOSIS — R42 DIZZINESS AND GIDDINESS: ICD-10-CM

## 2023-07-28 DIAGNOSIS — G89.29 CHRONIC BILATERAL LOW BACK PAIN WITHOUT SCIATICA: ICD-10-CM

## 2023-07-28 DIAGNOSIS — M54.50 CHRONIC BILATERAL LOW BACK PAIN WITHOUT SCIATICA: ICD-10-CM

## 2023-07-28 PROCEDURE — 72141 MRI NECK SPINE W/O DYE: CPT | Mod: 26,,, | Performed by: RADIOLOGY

## 2023-07-28 PROCEDURE — 72141 MRI CERVICAL SPINE WITHOUT CONTRAST: ICD-10-PCS | Mod: 26,,, | Performed by: RADIOLOGY

## 2023-07-28 PROCEDURE — 70551 MRI BRAIN WITHOUT CONTRAST: ICD-10-PCS | Mod: 26,,, | Performed by: RADIOLOGY

## 2023-07-28 PROCEDURE — 70551 MRI BRAIN STEM W/O DYE: CPT | Mod: TC

## 2023-07-28 PROCEDURE — 70551 MRI BRAIN STEM W/O DYE: CPT | Mod: 26,,, | Performed by: RADIOLOGY

## 2023-07-28 PROCEDURE — 72141 MRI NECK SPINE W/O DYE: CPT | Mod: TC

## 2023-07-28 RX ORDER — MECLIZINE HYDROCHLORIDE 25 MG/1
25 TABLET ORAL 3 TIMES DAILY PRN
Qty: 60 TABLET | Refills: 1 | Status: SHIPPED | OUTPATIENT
Start: 2023-07-28

## 2023-07-28 NOTE — PROGRESS NOTES
MRI completed and pt moved back to Zone 3 and to WC / pt ID cards with pt and Med Tronic Rep placed device to normal mode / pt escorted to family in [place] and D/C home

## 2023-07-28 NOTE — TELEPHONE ENCOUNTER
States he has a condition since 2020 that makes him get lethargic and dizzy that lasts about 3-4 days (I'm guessing vertigo). States Dr Bartlett prescribed meclizine for this in the past. Asking if you can send script to his pharmacy

## 2023-07-28 NOTE — TELEPHONE ENCOUNTER
----- Message from Geneva Mo MA sent at 7/28/2023  2:33 PM CDT -----  Contact: Kenyon @ [phone]  The patient calling to speak with the provider about being lethargic and dizzy. Says he has been dealing with it for a ling time and want to know if the provider can prescribe him a medication. Please give him a call back at [phone].          [id number] - [facility] - [address] AT [facility] OF  [address] [place]  [address]  [place] [address]  Phone: [phone] Fax: [phone]

## 2023-07-28 NOTE — PROGRESS NOTES
Pt arrived to MRI and moved to Zone 3 / Med Trokrish Rep placed cardiac device to MRI safe mode @ 80bpm / pt AAO w/ NAD assisted from  to table

## 2023-07-28 NOTE — TELEPHONE ENCOUNTER
Called pt's daughter (primary contact), informed her that rx was sent, pt's daughter aware and will let pt know

## 2023-07-31 ENCOUNTER — PATIENT MESSAGE (OUTPATIENT)
Dept: NEUROLOGY | Facility: CLINIC | Age: 83
End: 2023-07-31
Payer: MEDICARE

## 2023-07-31 DIAGNOSIS — G62.9 SENSORY NEUROPATHY: Primary | ICD-10-CM

## 2023-07-31 NOTE — TELEPHONE ENCOUNTER
Thank you so much for taking care of my patient.  Please let me know if there is any further questions.

## 2023-08-01 RX ORDER — VENLAFAXINE HYDROCHLORIDE 37.5 MG/1
37.5 CAPSULE, EXTENDED RELEASE ORAL DAILY
Qty: 30 CAPSULE | Refills: 11 | Status: SHIPPED | OUTPATIENT
Start: 2023-08-01 | End: 2024-03-18 | Stop reason: SDUPTHER

## 2023-08-02 ENCOUNTER — TELEPHONE (OUTPATIENT)
Dept: PRIMARY CARE CLINIC | Facility: CLINIC | Age: 83
End: 2023-08-02
Payer: MEDICARE

## 2023-08-02 NOTE — LETTER
AUTHORIZATION FOR RELEASE OF   CONFIDENTIAL INFORMATION    Dear [facility],    We are seeing Kenyon Hunt, date of birth 1940, in the clinic at [facility]. Italia Ramírez MD is the patient's PCP. Kenyon Hunt has an outstanding lab/procedure at the time we reviewed his chart. In order to help keep his health information updated, he has authorized us to request the following medical record(s):        (  )  MAMMOGRAM                                      (  )  COLONOSCOPY      (  )  PAP SMEAR                                          (  )  OUTSIDE LAB RESULTS     (  )  DEXA SCAN                                          (  )  EYE EXAM            (  )  FOOT EXAM                                          ( x )  ENTIRE RECORD     (  )  OUTSIDE IMMUNIZATIONS                 (  )  _______________         Please fax records to [facility], Jenna C Jordan, MD, [phone]     If you have any questions, please contact [place] at [phone].           Patient Name: Kenyon Hunt  : 1940  Patient Phone #: [phone]

## 2023-08-07 PROCEDURE — G0180 PR HOME HEALTH MD CERTIFICATION: ICD-10-PCS | Mod: ,,, | Performed by: INTERNAL MEDICINE

## 2023-08-07 PROCEDURE — G0180 MD CERTIFICATION HHA PATIENT: HCPCS | Mod: ,,, | Performed by: INTERNAL MEDICINE

## 2023-08-08 ENCOUNTER — OFFICE VISIT (OUTPATIENT)
Dept: NEUROLOGY | Facility: CLINIC | Age: 83
End: 2023-08-08
Payer: MEDICARE

## 2023-08-08 VITALS
HEART RATE: 60 BPM | DIASTOLIC BLOOD PRESSURE: 75 MMHG | SYSTOLIC BLOOD PRESSURE: 143 MMHG | BODY MASS INDEX: 28.67 KG/M2 | WEIGHT: 172.06 LBS | HEIGHT: 65 IN

## 2023-08-08 DIAGNOSIS — G62.9 SENSORY NEUROPATHY: Primary | ICD-10-CM

## 2023-08-08 DIAGNOSIS — M54.12 CERVICAL RADICULOPATHY: ICD-10-CM

## 2023-08-08 DIAGNOSIS — R42 DIZZINESS AND GIDDINESS: ICD-10-CM

## 2023-08-08 PROCEDURE — 1159F MED LIST DOCD IN RCRD: CPT | Mod: CPTII,S$GLB,, | Performed by: PSYCHIATRY & NEUROLOGY

## 2023-08-08 PROCEDURE — 1159F PR MEDICATION LIST DOCUMENTED IN MEDICAL RECORD: ICD-10-PCS | Mod: CPTII,S$GLB,, | Performed by: PSYCHIATRY & NEUROLOGY

## 2023-08-08 PROCEDURE — 3077F SYST BP >= 140 MM HG: CPT | Mod: CPTII,S$GLB,, | Performed by: PSYCHIATRY & NEUROLOGY

## 2023-08-08 PROCEDURE — 1100F PR PT FALLS ASSESS DOC 2+ FALLS/FALL W/INJURY/YR: ICD-10-PCS | Mod: CPTII,S$GLB,, | Performed by: PSYCHIATRY & NEUROLOGY

## 2023-08-08 PROCEDURE — 3288F FALL RISK ASSESSMENT DOCD: CPT | Mod: CPTII,S$GLB,, | Performed by: PSYCHIATRY & NEUROLOGY

## 2023-08-08 PROCEDURE — 1125F PR PAIN SEVERITY QUANTIFIED, PAIN PRESENT: ICD-10-PCS | Mod: CPTII,S$GLB,, | Performed by: PSYCHIATRY & NEUROLOGY

## 2023-08-08 PROCEDURE — 3078F PR MOST RECENT DIASTOLIC BLOOD PRESSURE < 80 MM HG: ICD-10-PCS | Mod: CPTII,S$GLB,, | Performed by: PSYCHIATRY & NEUROLOGY

## 2023-08-08 PROCEDURE — 99999 PR PBB SHADOW E&M-EST. PATIENT-LVL III: ICD-10-PCS | Mod: PBBFAC,,, | Performed by: PSYCHIATRY & NEUROLOGY

## 2023-08-08 PROCEDURE — 99215 OFFICE O/P EST HI 40 MIN: CPT | Mod: S$GLB,,, | Performed by: PSYCHIATRY & NEUROLOGY

## 2023-08-08 PROCEDURE — 1125F AMNT PAIN NOTED PAIN PRSNT: CPT | Mod: CPTII,S$GLB,, | Performed by: PSYCHIATRY & NEUROLOGY

## 2023-08-08 PROCEDURE — 3288F PR FALLS RISK ASSESSMENT DOCUMENTED: ICD-10-PCS | Mod: CPTII,S$GLB,, | Performed by: PSYCHIATRY & NEUROLOGY

## 2023-08-08 PROCEDURE — 3077F PR MOST RECENT SYSTOLIC BLOOD PRESSURE >= 140 MM HG: ICD-10-PCS | Mod: CPTII,S$GLB,, | Performed by: PSYCHIATRY & NEUROLOGY

## 2023-08-08 PROCEDURE — 99215 PR OFFICE/OUTPT VISIT, EST, LEVL V, 40-54 MIN: ICD-10-PCS | Mod: S$GLB,,, | Performed by: PSYCHIATRY & NEUROLOGY

## 2023-08-08 PROCEDURE — 3078F DIAST BP <80 MM HG: CPT | Mod: CPTII,S$GLB,, | Performed by: PSYCHIATRY & NEUROLOGY

## 2023-08-08 PROCEDURE — 99999 PR PBB SHADOW E&M-EST. PATIENT-LVL III: CPT | Mod: PBBFAC,,, | Performed by: PSYCHIATRY & NEUROLOGY

## 2023-08-08 PROCEDURE — 1100F PTFALLS ASSESS-DOCD GE2>/YR: CPT | Mod: CPTII,S$GLB,, | Performed by: PSYCHIATRY & NEUROLOGY

## 2023-08-08 NOTE — PATIENT INSTRUCTIONS
Walk and exercise  Neck Physiotherapy  Fall precautions  In case of any question, plz contact through [facility].

## 2023-08-08 NOTE — PROGRESS NOTES
[facility] - NEUROLOGY 7TH [place] [facility], [place]    Date: 8/8/23  Patient Name: Kenyon Hunt   MRN: [id number]   Referring Provider: No ref. provider found    Thank you so much No ref. provider found for your patient referral to Neuromuscular team at [facility]. We take pride in our care coordination and look forward to your feedback and questions.    Assessment:   Kenyon Hunt is a 82 y.o. male is a very pleasant patient with multiple neurological issues impairing his mobility with chronic pain including evidence of sensory predominant polyneuropathy, chronic backache beside dizziness.  He improved with adjustment of the medication and physical therapy in terms of his falls.  I discussed about healthy lifestyle and exercise/physical therapy for continued improvement in his condition.  Follow-up in clinic as needed.    I would wish him very best for improvement/recovery in his condition.    Future direction based on feedback:    Plan:     Problem List Items Addressed This Visit    None  Visit Diagnoses       Sensory neuropathy    -  Primary    Dizziness and giddiness        Cervical radiculopathy                  Oneyda Holbrook MD    This evaluation was completed in >45  Minutes over 50% of the time spent on education & counseling. This includes face to face time and non-face to face time preparing to see the patient (eg, review of tests), obtaining and/or reviewing separately obtained history, documenting clinical information in the electronic or other health record, independently interpreting results and communicating results to the patient/family/caregiver, or care coordinator.    Patient note was created using MModal Dictation.  Any errors in syntax or even information may not have been identified and edited on initial review prior to signing this note.    Details provided by:    Patient  Family- Daughter     Interval history on 8/8/23   The detail  was confirmed with the patient and his daughter who mentioned no more falls after the adjustment of the medication especially cutting down and stopping on Lexapro and bupropion.  His fatigue is still there.  As family tried to stop Ativan his anxiety and panic attack worsened but I guided them to use it as needed for the future.  He is able to walk but still feels imbalance.  Currently getting physical therapy 2 times per week.  RN will be visiting his home once per week.  He mentioned right arm pins and needles.    Interval workup:   MRI cervical spine on 07/28/2023   No cervical cord signal abnormality. Disc bulging with endplate osteophyte formation at C3-4 through C6-7 but no evidence of overt cord impingement.  Mild to moderate degrees of foraminal stenosis bilaterally.    MRI brain on 07/28/2023   No acute intracranial process.  Similar appearance of volume loss and chronic white matter changes.  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  The brain is otherwise overall stable in appearance.      History of Present Illness:  07/05/2023     Mr. Kenyon Hunt is a 82 y.o. male presenting for evaluation of  left arm numbness and tingling.     The detail was confirmed with the patient who mentioned numbness and tingling in the lateral side of hand and fingers for more than 1 year which has been progressively worsening over time.  He has also noted feeling of swelling in the legs when actually there is no swelling.  He has some confusion in January 2023.  Feels that he has been falling more than usual for past 1 week which he initially noticed in February 2020.  There is history of urine incontinence since February 2020.  He usually gets up in the night to go to the restroom. He tends to fall backwards.      He has medication adjustment but still there is no confirmed diagnosis for his condition.  He mentioned some memory issues where he forgets what he said with  predominant short term memory issues for the past few months.      He has been getting physical therapy for the past 6 weeks for stumbling and falls.  He also mentioned right rotator cuff surgery in the past. He has epidural in the past.  He used to play baseball in [employer].      There is no history of smoking and he does not drink alcohol.  He had habit of heavy drinking for a few years in his 70s. He lives with his wife who is the primary caretaker for him.  They would like to have home health services by [facility]. His daughter works in [employer] as medical assistant in center for clotting and bleeding disorders.    Chart Review:  Recent ED visit on 06/25/2023.    82-year-old gentleman with past medical history including Parkinson's disease presents for evaluation of altered mental status.  Patient reports that he has a pins and needle sensation in his extremities.  Initially reports that it was only in the right arm.  During the examination, he then stated that he had it also in the right leg from the toes to the knee.  He then developed it in the left ankle.  He then developed it in the left hand.  He said this has been ongoing for some time.   Daughter is at bedside and she reports the reason for the emergency department visit is that this afternoon another sibling called him and he seemed confused.  He was unable to answer their questions.  They do not report that he had any slurred speech.  This occurred at approximately 1:45 p.m..  Approximately 15 minutes later they were at his house and they report that he seemed normal.    Pertinent work up based on chart review for current condition:   high   Comprehensive metabolic panel normal   TSH 3.46   Creatinine 1.0 normal   Vitamin E 1649 normal   RPR normal   Serum lead normal   Vitamin B6 --20 normal   Vitamin B1 --110 normal   Vitamin B12-- 442 normal   CK 84 normal     2/2022 EMG without evidence of neuropathy per patient.  No records found in the  "chart.      CT scan head on 06/25/2023.    No CT findings of large territorial infarction.  Additional evaluation with MRI of the brain, as clinically warranted    MRI brain and MRA neck on 05/02/2022.    Moderate chronic microvascular ischemic change and cerebral volume loss.   Prominent perivascular spaces throughout bilateral basal ganglia.   No evidence of acute infarct or hemorrhage.   MR angiogram of the head and neck appears within normal limits, allowing for some artifact.  No evidence of high-grade stenosis or large vessel occlusion    Last CBC Results:   Lab Results   Component Value Date    WBC 7.90 06/25/2023    HGB 13.2 (L) 06/25/2023    HCT 39.1 (L) 06/25/2023     06/25/2023       Last CMP Results  Lab Results   Component Value Date     06/25/2023    K 3.9 06/25/2023     06/25/2023    CO2 21 (L) 06/25/2023    BUN 19 06/25/2023    CREATININE 0.9 06/25/2023    CALCIUM 9.1 06/25/2023    ALBUMIN 3.4 (L) 06/25/2023    AST 16 06/25/2023    ALT <5 (L) 06/25/2023       Last Lipids  Lab Results   Component Value Date    CHOL 140 09/09/2022    TRIG 92 09/09/2022    HDL 58 09/09/2022    LDLCALC 64 09/09/2022       Last A1C  No results found for: "HGBA1C"    Last TSH  Lab Results   Component Value Date    TSH 3.46 09/09/2022       MRI 08/08/2023    Review of Systems:  12 system review of systems is negative except for the symptoms mentioned in HPI.       PAST MEDICAL HISTORY:  Past Medical History:   Diagnosis Date    Strabismus        PAST SURGICAL HISTORY:  Past Surgical History:   Procedure Laterality Date    CATARACT EXTRACTION W/  INTRAOCULAR LENS IMPLANT Bilateral     pace maker      SHOULDER SURGERY         CURRENT MEDS:  I have reconciled the patient's home medications and discharge medications with the patient/family. I have updated all changes.  Refer to After-Visit Medication List.    Current Outpatient Medications   Medication Sig Dispense Refill    aspirin (ECOTRIN) 81 MG EC tablet " "Take 1 tablet (81 mg total) by mouth once daily. 90 tablet 3    atorvastatin (LIPITOR) 10 MG tablet Take 1 tablet (10 mg total) by mouth every other day. 45 tablet 3    buPROPion (WELLBUTRIN SR) 200 MG SR12 Take 1 tablet (200 mg total) by mouth 2 (two) times daily. 180 tablet 3    calcium carbonate (OS-FELICITY) 600 mg calcium (1,500 mg) Tab Take 600 mg by mouth 2 (two) times a day.      carbidopa-levodopa  mg (SINEMET)  mg per tablet Take 2 tablets by mouth 3 (three) times daily. 180 tablet 11    fluticasone propionate (FLONASE) 50 mcg/actuation nasal spray 1 spray (50 mcg total) by Each Nostril route once daily. 32 g 11    irbesartan (AVAPRO) 150 MG tablet Take 150 mg by mouth once daily.      meclizine (ANTIVERT) 25 mg tablet Take 1 tablet (25 mg total) by mouth 3 (three) times daily as needed for Dizziness. 60 tablet 1    melatonin 5 mg Cap Take 1 capsule by mouth every evening.      metoprolol tartrate (LOPRESSOR) 25 MG tablet Take 25 mg by mouth 2 (two) times a day.      tamsulosin (FLOMAX) 0.4 mg Cap TAKE 1 CAPSULE(0.4 MG) BY MOUTH EVERY DAY 90 capsule 3    venlafaxine (EFFEXOR-XR) 37.5 MG 24 hr capsule Take 1 capsule (37.5 mg total) by mouth once daily. 30 capsule 11     No current facility-administered medications for this visit.       ALLERGIES:  Review of patient's allergies indicates:   Allergen Reactions    Carbamazepine Rash       FAMILY HISTORY:  Family History   Problem Relation Age of Onset    Amblyopia Neg Hx     Blindness Neg Hx     Cataracts Neg Hx     Glaucoma Neg Hx     Macular degeneration Neg Hx     Retinal detachment Neg Hx     Strabismus Neg Hx        SOCIAL HISTORY:  Social History     Tobacco Use    Smoking status: Former     Current packs/day: 0.00    Smokeless tobacco: Never   Substance Use Topics    Alcohol use: Yes         Objective:     Vitals:    08/08/23 1624   BP: (!) 143/75   Pulse: 60   Weight: 78 kg (172 lb 1.1 oz)   Height: 5' 5" (1.651 m)       Wt Readings from Last 3 " Encounters:   06/29/23 1305 76.6 kg (168 lb 15.7 oz)   03/13/23 1459 76.3 kg (168 lb 3.4 oz)   02/22/23 1551 75 kg (165 lb 5.5 oz)     Body mass index is 28.63 kg/m².       Eyes: no tearing, discharge, no erythema   ENT: moist mucous membranes of the oral cavity, nares patent    Neck: Supple, full range of motion  Cardiovascular: Warm and well perfused, pulses equal and symmetrical  Lungs: Normal work of breathing, normal chest wall excursions  Skin: No rash, lesions, or breakdown on exposed skin  Psychiatry: Mood and affect are appropriate   Extremeties: No cyanosis, clubbing or edema.    GENERAL/CONSTITUTIONAL:    -Well appearing; well nourished  -Bilateral moderate pedal edema.    -He had left little trigger finger.      HIGHER INTEGRATIVE FUNCTIONS:   -Attention & concentration: Normal   -Orientation: Oriented to person, place & time  -Memory: Normal  -Language: Normal   -Fund of Knowledge: Normal     CRANIAL NERVES:   -CN 2: Visual fields full  -CN 2,3: PERRL  -CN 3,4,6: EOMI with no nystagmus  -CN 5: Facial sensation intact bilaterally  -CN 7: Facial strength/movement intact bilaterally  -CN 8: Hearing normal bilaterally  -CN 9,10: Palate elevates symmetrically  -CN 11: Normal shoulder shrug and head turn  -CN 12: Tongue protrudes midline     MOTOR:   -Tone: normal in upper and lower extremities  -UE/LE motor:       Upper Ext Right Left Lower Ext Right Left   Shoulder Abd 5 5 Hip flexion 4 3   Elbow flexion 5 5 Knee extension 5 5   Elbow extension 5 5 Knee flexion 5 5   Fingers abduction 5 5 Ankle dorsiflexion 5 5   Wrist extension   Ankle plantar flexion     Wrist flexion   Great toe dorsiflexion     Finger extension   Thigh adduction     Finger flexion   Thigh abduction     Thumb abduction            REFLEXES:      R L  R L   Triceps 2 2 Knee 2 2   Biceps 2 2 Ankle 2 2   BR 2 2        -Flexor plantar reflex bilaterally     SENSATION:   -Vibration sense is impaired up to knees bilaterally in lower extremities  and up to elbows in upper extremities.      REFLEXES:   -2/4 upper and lower extremities bilaterally  -Flexor plantar reflex bilaterally    COORDINATION:   -FNF normal bilaterally    GAIT:   -came in wheelchair  -There is no pain on neck movements.      Scheduled Follow-up :  Future Appointments   Date Time Provider Department Center   8/8/2023  4:30 PM Oneyda Holbrook MD [facility] NEURO [facility]   9/18/2023  2:00 PM [facility]rez [facility]       After Visit Medication List :     Medication List            Accurate as of August 8, 2023  3:14 PM. If you have any questions, ask your nurse or doctor.                CONTINUE taking these medications      aspirin 81 MG EC tablet  Commonly known as: ECOTRIN  Take 1 tablet (81 mg total) by mouth once daily.     atorvastatin 10 MG tablet  Commonly known as: LIPITOR  Take 1 tablet (10 mg total) by mouth every other day.     buPROPion 200 MG Sr12  Commonly known as: WELLBUTRIN SR  Take 1 tablet (200 mg total) by mouth 2 (two) times daily.     calcium carbonate 600 mg calcium (1,500 mg) Tab  Commonly known as: OS-FELICITY     carbidopa-levodopa  mg  mg per tablet  Commonly known as: SINEMET  Take 2 tablets by mouth 3 (three) times daily.     fluticasone propionate 50 mcg/actuation nasal spray  Commonly known as: FLONASE  1 spray (50 mcg total) by Each Nostril route once daily.     irbesartan 150 MG tablet  Commonly known as: AVAPRO     meclizine 25 mg tablet  Commonly known as: ANTIVERT  Take 1 tablet (25 mg total) by mouth 3 (three) times daily as needed for Dizziness.     melatonin 5 mg Cap     metoprolol tartrate 25 MG tablet  Commonly known as: LOPRESSOR     tamsulosin 0.4 mg Cap  Commonly known as: FLOMAX  TAKE 1 CAPSULE(0.4 MG) BY MOUTH EVERY DAY     venlafaxine 37.5 MG 24 hr capsule  Commonly known as: EFFEXOR-XR  Take 1 capsule (37.5 mg total) by mouth once daily.              Signing Physician:          Oneyda Holbrook MD  ,  [employer] [facility] [employer]  Neurology Consultant. [facility].   [address],  [facility]. [address]th [place] [address].

## 2023-08-09 ENCOUNTER — DOCUMENT SCAN (OUTPATIENT)
Dept: HOME HEALTH SERVICES | Facility: HOSPITAL | Age: 83
End: 2023-08-09
Payer: MEDICARE

## 2023-08-29 ENCOUNTER — EXTERNAL HOME HEALTH (OUTPATIENT)
Dept: HOME HEALTH SERVICES | Facility: HOSPITAL | Age: 83
End: 2023-08-29
Payer: MEDICARE

## 2023-09-18 ENCOUNTER — OFFICE VISIT (OUTPATIENT)
Dept: PRIMARY CARE CLINIC | Facility: CLINIC | Age: 83
End: 2023-09-18
Payer: MEDICARE

## 2023-09-18 ENCOUNTER — TELEPHONE (OUTPATIENT)
Dept: PRIMARY CARE CLINIC | Facility: CLINIC | Age: 83
End: 2023-09-18

## 2023-09-18 VITALS
WEIGHT: 173.06 LBS | BODY MASS INDEX: 28.83 KG/M2 | HEIGHT: 65 IN | SYSTOLIC BLOOD PRESSURE: 138 MMHG | HEART RATE: 82 BPM | DIASTOLIC BLOOD PRESSURE: 80 MMHG | OXYGEN SATURATION: 97 %

## 2023-09-18 DIAGNOSIS — G20.C PARKINSONISM, UNSPECIFIED PARKINSONISM TYPE: ICD-10-CM

## 2023-09-18 DIAGNOSIS — I10 ESSENTIAL HYPERTENSION: ICD-10-CM

## 2023-09-18 DIAGNOSIS — Z86.73 HISTORY OF CVA (CEREBROVASCULAR ACCIDENT): ICD-10-CM

## 2023-09-18 DIAGNOSIS — R00.1 SYMPTOMATIC BRADYCARDIA: ICD-10-CM

## 2023-09-18 DIAGNOSIS — Z95.0 PACEMAKER: Primary | ICD-10-CM

## 2023-09-18 DIAGNOSIS — K21.9 GASTROESOPHAGEAL REFLUX DISEASE WITHOUT ESOPHAGITIS: ICD-10-CM

## 2023-09-18 DIAGNOSIS — E78.2 MIXED HYPERLIPIDEMIA: ICD-10-CM

## 2023-09-18 DIAGNOSIS — R26.81 UNSTEADY GAIT: ICD-10-CM

## 2023-09-18 DIAGNOSIS — I25.708: ICD-10-CM

## 2023-09-18 DIAGNOSIS — F41.9 ANXIETY: ICD-10-CM

## 2023-09-18 PROBLEM — H81.11 BPPV (BENIGN PAROXYSMAL POSITIONAL VERTIGO), RIGHT: Status: RESOLVED | Noted: 2022-09-18 | Resolved: 2023-09-18

## 2023-09-18 PROCEDURE — 99214 PR OFFICE/OUTPT VISIT, EST, LEVL IV, 30-39 MIN: ICD-10-PCS | Mod: S$GLB,,, | Performed by: INTERNAL MEDICINE

## 2023-09-18 PROCEDURE — 1125F AMNT PAIN NOTED PAIN PRSNT: CPT | Mod: CPTII,S$GLB,, | Performed by: INTERNAL MEDICINE

## 2023-09-18 PROCEDURE — 99214 OFFICE O/P EST MOD 30 MIN: CPT | Mod: S$GLB,,, | Performed by: INTERNAL MEDICINE

## 2023-09-18 PROCEDURE — 1159F PR MEDICATION LIST DOCUMENTED IN MEDICAL RECORD: ICD-10-PCS | Mod: CPTII,S$GLB,, | Performed by: INTERNAL MEDICINE

## 2023-09-18 PROCEDURE — 3079F DIAST BP 80-89 MM HG: CPT | Mod: CPTII,S$GLB,, | Performed by: INTERNAL MEDICINE

## 2023-09-18 PROCEDURE — 3075F PR MOST RECENT SYSTOLIC BLOOD PRESS GE 130-139MM HG: ICD-10-PCS | Mod: CPTII,S$GLB,, | Performed by: INTERNAL MEDICINE

## 2023-09-18 PROCEDURE — 3075F SYST BP GE 130 - 139MM HG: CPT | Mod: CPTII,S$GLB,, | Performed by: INTERNAL MEDICINE

## 2023-09-18 PROCEDURE — 3079F PR MOST RECENT DIASTOLIC BLOOD PRESSURE 80-89 MM HG: ICD-10-PCS | Mod: CPTII,S$GLB,, | Performed by: INTERNAL MEDICINE

## 2023-09-18 PROCEDURE — 1125F PR PAIN SEVERITY QUANTIFIED, PAIN PRESENT: ICD-10-PCS | Mod: CPTII,S$GLB,, | Performed by: INTERNAL MEDICINE

## 2023-09-18 PROCEDURE — 1159F MED LIST DOCD IN RCRD: CPT | Mod: CPTII,S$GLB,, | Performed by: INTERNAL MEDICINE

## 2023-09-18 PROCEDURE — 99999 PR PBB SHADOW E&M-EST. PATIENT-LVL III: CPT | Mod: PBBFAC,,, | Performed by: INTERNAL MEDICINE

## 2023-09-18 PROCEDURE — 99999 PR PBB SHADOW E&M-EST. PATIENT-LVL III: ICD-10-PCS | Mod: PBBFAC,,, | Performed by: INTERNAL MEDICINE

## 2023-09-18 RX ORDER — LORAZEPAM 0.5 MG/1
0.5 TABLET ORAL EVERY 8 HOURS PRN
Qty: 90 TABLET | Refills: 5 | Status: SHIPPED | OUTPATIENT
Start: 2023-09-18 | End: 2024-03-18

## 2023-09-18 NOTE — ASSESSMENT & PLAN NOTE
Sees Dr. Snell, on Sinemet BID.  Feels like is shuffling less and walking better.  Has dizziness with AM dose on occasion.

## 2023-09-18 NOTE — ASSESSMENT & PLAN NOTE
Stable on effexor 37.5 mg, Lorazepam PRN (takes 1/2 tablet BID).  No SI/HI. Panic attacks have been better since starting Effexor.

## 2023-09-18 NOTE — TELEPHONE ENCOUNTER
----- Message from Moises Lee sent at 9/18/2023  2:59 PM CDT -----  Contact: [phone]  [email or web address]     Patient is calling for Medical Advice regarding: needs a call back     How long has patient had these symptoms:    Pharmacy name and phone#:    Would like response via Spotsterhart:  call back     Comments:  Pt said he needs a call back

## 2023-09-18 NOTE — PROGRESS NOTES
[facility] Note    Chief Complaint      Chief Complaint   Patient presents with    Follow-up       History of Present Illness      Kenyon Hunt is a 82 y.o. male who presents today for follow up of HTN.  Patient comes to appointment with spouse.  Cards: Jered Ortho: Cj, Neuro: Channing    Problem List Items Addressed This Visit       Essential hypertension    Current Assessment & Plan     Stable on irbesartan 150 mg daily and metoprolol 25 mg BID, no CP/SOB/HA.          Anxiety    Current Assessment & Plan     Stable on effexor 37.5 mg, Lorazepam PRN (takes 1/2 tablet BID).  No SI/HI. Panic attacks have been better since starting Effexor.         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  No CP/SOB.  Sees Dr. Lawton who is retiring.           Hyperlipidemia    Current Assessment & Plan     Stable on lipitor, no myalgias  The ASCVD Risk score (Georgiana DK, et al., 2019) failed to calculate for the following reasons:    The 2019 ASCVD risk score is only valid for ages 40 to 79          Relevant Orders    CBC Auto Differential    Lipid Panel    Comprehensive Metabolic Panel    Gastroesophageal reflux disease without esophagitis    Current Assessment & Plan     Stable on protonix, sees Dr. Lowery.  No issues at present         History of CVA (cerebrovascular accident)    Current Assessment & Plan     After 2/2020, residual dizziness and optho issues, on ASA and lipitor every other day.         Pacemaker - Primary    Symptomatic bradycardia    Current Assessment & Plan     S/p pacemaker with Dr. Vinson (cardiologist is Dr. Lawton), doing better since implant.         Unsteady gait    Current Assessment & Plan     Seeing neuro, using walker, still feels unsteady.         Parkinsonism     Current Assessment & Plan     Sees Dr. Snell, on Sinemet BID.  Feels like is shuffling less and walking better.  Has dizziness with AM dose on occasion.                Health Maintenance   Topic Date Due    TETANUS VACCINE  Never done    Shingles Vaccine (2 of 2) 04/13/2018    Lipid Panel  09/09/2027    Aspirin/Antiplatelet Therapy  Discontinued       Past Medical History:   Diagnosis Date    Strabismus        Past Surgical History:   Procedure Laterality Date    CATARACT EXTRACTION W/  INTRAOCULAR LENS IMPLANT Bilateral     pace maker      SHOULDER SURGERY         family history is not on file.    Social History     Tobacco Use    Smoking status: Former    Smokeless tobacco: Never   Substance Use Topics    Alcohol use: Yes       Review of Systems   Constitutional:  Negative for chills and fever.   Respiratory:  Negative for cough and shortness of breath.    Cardiovascular:  Negative for chest pain and palpitations.   Gastrointestinal:  Negative for constipation, diarrhea, nausea and vomiting.   Genitourinary:  Negative for dysuria and hematuria.   Musculoskeletal:  Negative for falls.   Neurological:  Negative for headaches.        Outpatient Encounter Medications as of 9/18/2023   Medication Sig Dispense Refill    aspirin (ECOTRIN) 81 MG EC tablet Take 1 tablet (81 mg total) by mouth once daily. 90 tablet 3    atorvastatin (LIPITOR) 10 MG tablet Take 1 tablet (10 mg total) by mouth every other day. 45 tablet 3    calcium carbonate (OS-FELICITY) 600 mg calcium (1,500 mg) Tab Take 600 mg by mouth 2 (two) times a day.      carbidopa-levodopa  mg (SINEMET)  mg per tablet Take 2 tablets by mouth 3 (three) times daily. 180 tablet 11    fluticasone propionate (FLONASE) 50 mcg/actuation nasal spray 1 spray (50 mcg total) by Each Nostril route once daily. 32 g 11    irbesartan (AVAPRO) 150 MG tablet Take 150 mg by mouth once daily.      meclizine (ANTIVERT) 25 mg tablet Take 1 tablet (25 mg total) by mouth 3  "(three) times daily as needed for Dizziness. 60 tablet 1    melatonin 5 mg Cap Take 1 capsule by mouth every evening.      metoprolol tartrate (LOPRESSOR) 25 MG tablet Take 25 mg by mouth 2 (two) times a day.      tamsulosin (FLOMAX) 0.4 mg Cap TAKE 1 CAPSULE(0.4 MG) BY MOUTH EVERY DAY 90 capsule 3    venlafaxine (EFFEXOR-XR) 37.5 MG 24 hr capsule Take 1 capsule (37.5 mg total) by mouth once daily. 30 capsule 11    LORazepam (ATIVAN) 0.5 MG tablet Take 1 tablet (0.5 mg total) by mouth every 8 (eight) hours as needed for Anxiety. 90 tablet 5     No facility-administered encounter medications on file as of 9/18/2023.        Review of patient's allergies indicates:   Allergen Reactions    Carbamazepine Rash       Physical Exam      Vital Signs  Pulse: 82  SpO2: 97 %  BP: 138/80  Pain Score:   5  Pain Loc: Leg  Height and Weight  Height: 5' 5" (165.1 cm)  Weight: 78.5 kg (173 lb 1 oz)  BSA (Calculated - sq m): 1.9 sq meters  BMI (Calculated): 28.8  Weight in (lb) to have BMI = 25: 149.9]    Physical Exam  Constitutional:       Appearance: He is well-developed.   HENT:      Head: Normocephalic and atraumatic.   Neck:      Thyroid: No thyromegaly.   Cardiovascular:      Rate and Rhythm: Normal rate and regular rhythm.      Heart sounds: No murmur heard.  Pulmonary:      Effort: Pulmonary effort is normal. No respiratory distress.      Breath sounds: Normal breath sounds.   Abdominal:      General: There is no distension.      Palpations: Abdomen is soft.      Tenderness: There is no abdominal tenderness.   Skin:     General: Skin is warm and dry.   Neurological:      Mental Status: He is alert and oriented to person, place, and time.   Psychiatric:         Behavior: Behavior normal.          Laboratory:  CBC:  Recent Labs   Lab Result Units 06/25/23  1529   WBC K/uL 7.90   RBC M/uL 4.22*   Hemoglobin g/dL 13.2*   Hematocrit % 39.1*   Platelets K/uL 217   MCV fL 93   MCH pg 31.3*   MCHC g/dL 33.8       CMP:  Recent Labs " "  Lab Result Units 06/25/23  1529   Glucose mg/dL 100   Calcium mg/dL 9.1   Albumin g/dL 3.4*   Total Protein g/dL 6.8   Sodium mmol/L 138   Potassium mmol/L 3.9   CO2 mmol/L 21*   Chloride mmol/L 106   BUN mg/dL 19   Alkaline Phosphatase U/L 59   ALT U/L <5*   AST U/L 16   Total Bilirubin mg/dL 0.3       URINALYSIS:  Recent Labs   Lab Result Units 06/25/23  1620   Color, UA  Yellow   Specific Gravity, UA  1.015   pH, UA  8.0   Protein, UA  Negative   Nitrite, UA  Negative   Leukocytes, UA  Negative      LIPIDS:  No results for input(s): "TSH", "HDL", "CHOL", "TRIG", "LDLCALC", "CHOLHDL", "NONHDLCHOL", "TOTALCHOLEST" in the last 2160 hours.    TSH:  No results for input(s): "TSH" in the last 2160 hours.    A1C:  No results for input(s): "HGBA1C" in the last 2160 hours.    Radiology:  No results found in the last 30 days.     Assessment/Plan     Kenyon Hunt is a 82 y.o.male with:    1. Essential hypertension    2. Mixed hyperlipidemia  - CBC Auto Differential; Future  - Lipid Panel; Future  - Comprehensive Metabolic Panel; Future  - CBC Auto Differential  - Lipid Panel  - Comprehensive Metabolic Panel    3. Symptomatic bradycardia    4. Pacemaker    5. 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 Anxiety  - LORazepam (ATIVAN) 0.5 MG tablet; Take 1 tablet (0.5 mg total) by mouth every 8 (eight) hours as needed for Anxiety.  Dispense: 90 tablet; Refill: 5    7. History of CVA (cerebrovascular accident)    8. Parkinsonism, unspecified Parkinsonism type    9. Gastroesophageal reflux disease without esophagitis    10. Unsteady gait        -counseled on COVID booster and flu shot, declines  -counseled on increasing exercise  -Continue current medications and maintain follow up with specialists.    -Follow up in about 6 months (around 3/18/2024) for follow up of medical problems.       Italia Ramírez MD  [facility]

## 2023-10-02 ENCOUNTER — DOCUMENT SCAN (OUTPATIENT)
Dept: HOME HEALTH SERVICES | Facility: HOSPITAL | Age: 83
End: 2023-10-02
Payer: MEDICARE

## 2023-10-19 ENCOUNTER — DOCUMENT SCAN (OUTPATIENT)
Dept: HOME HEALTH SERVICES | Facility: HOSPITAL | Age: 83
End: 2023-10-19
Payer: MEDICARE

## 2023-10-24 ENCOUNTER — DOCUMENT SCAN (OUTPATIENT)
Dept: HOME HEALTH SERVICES | Facility: HOSPITAL | Age: 83
End: 2023-10-24
Payer: MEDICARE

## 2024-03-16 DIAGNOSIS — F41.9 ANXIETY: ICD-10-CM

## 2024-03-16 NOTE — TELEPHONE ENCOUNTER
Care Due:                  Date            Visit Type   Department     Provider  --------------------------------------------------------------------------------                                EP -                              PRIMARY      [facility] PRIMARY  Last Visit: 09-      CARE ([facility])   CARE           Italia Ramírez                              EP -                              PRIMARY      [facility] PRIMARY  Next Visit: 03-      CARE ([facility])   CARE           Italia Ramírez                                                            Last  Test          Frequency    Reason                     Performed    Due Date  --------------------------------------------------------------------------------    Lipid Panel.  12 months..  atorvastatin.............  09- 09-    [facility] Embedded Care Due Messages. Reference number: [id number].   3/16/2024 2:42:21 PM CDT

## 2024-03-18 ENCOUNTER — OFFICE VISIT (OUTPATIENT)
Dept: PRIMARY CARE CLINIC | Facility: CLINIC | Age: 84
End: 2024-03-18
Payer: MEDICARE

## 2024-03-18 VITALS
SYSTOLIC BLOOD PRESSURE: 122 MMHG | OXYGEN SATURATION: 96 % | HEART RATE: 84 BPM | BODY MASS INDEX: 27.77 KG/M2 | WEIGHT: 166.69 LBS | DIASTOLIC BLOOD PRESSURE: 72 MMHG | HEIGHT: 65 IN

## 2024-03-18 DIAGNOSIS — Z85.46 HISTORY OF PROSTATE CANCER: ICD-10-CM

## 2024-03-18 DIAGNOSIS — K21.9 GASTROESOPHAGEAL REFLUX DISEASE WITHOUT ESOPHAGITIS: ICD-10-CM

## 2024-03-18 DIAGNOSIS — E78.2 MIXED HYPERLIPIDEMIA: ICD-10-CM

## 2024-03-18 DIAGNOSIS — G89.29 CHRONIC BILATERAL LOW BACK PAIN WITHOUT SCIATICA: ICD-10-CM

## 2024-03-18 DIAGNOSIS — F41.9 ANXIETY: ICD-10-CM

## 2024-03-18 DIAGNOSIS — G62.9 SENSORY NEUROPATHY: ICD-10-CM

## 2024-03-18 DIAGNOSIS — I70.0 ATHEROSCLEROSIS OF AORTA: ICD-10-CM

## 2024-03-18 DIAGNOSIS — I25.708: ICD-10-CM

## 2024-03-18 DIAGNOSIS — Z79.899 ENCOUNTER FOR LONG-TERM (CURRENT) USE OF MEDICATIONS: ICD-10-CM

## 2024-03-18 DIAGNOSIS — Z00.00 ANNUAL PHYSICAL EXAM: Primary | ICD-10-CM

## 2024-03-18 DIAGNOSIS — M54.50 CHRONIC BILATERAL LOW BACK PAIN WITHOUT SCIATICA: ICD-10-CM

## 2024-03-18 DIAGNOSIS — R26.81 UNSTEADY GAIT: ICD-10-CM

## 2024-03-18 DIAGNOSIS — Z86.73 HISTORY OF CVA (CEREBROVASCULAR ACCIDENT): ICD-10-CM

## 2024-03-18 DIAGNOSIS — G20.C PARKINSONISM, UNSPECIFIED PARKINSONISM TYPE: ICD-10-CM

## 2024-03-18 DIAGNOSIS — I10 ESSENTIAL HYPERTENSION: ICD-10-CM

## 2024-03-18 PROBLEM — R00.1 SYMPTOMATIC BRADYCARDIA: Status: RESOLVED | Noted: 2022-02-25 | Resolved: 2024-03-18

## 2024-03-18 PROCEDURE — 99397 PER PM REEVAL EST PAT 65+ YR: CPT | Mod: S$GLB,,, | Performed by: INTERNAL MEDICINE

## 2024-03-18 PROCEDURE — 99999 PR PBB SHADOW E&M-EST. PATIENT-LVL III: CPT | Mod: PBBFAC,,, | Performed by: INTERNAL MEDICINE

## 2024-03-18 RX ORDER — TAMSULOSIN HYDROCHLORIDE 0.4 MG/1
0.4 CAPSULE ORAL DAILY
Qty: 90 CAPSULE | Refills: 3 | Status: SHIPPED | OUTPATIENT
Start: 2024-03-18

## 2024-03-18 RX ORDER — LORAZEPAM 0.5 MG/1
TABLET ORAL
Qty: 90 TABLET | Refills: 1 | Status: SHIPPED | OUTPATIENT
Start: 2024-03-18 | End: 2024-05-16

## 2024-03-18 RX ORDER — ATORVASTATIN CALCIUM 10 MG/1
10 TABLET, FILM COATED ORAL EVERY OTHER DAY
Qty: 45 TABLET | Refills: 3 | Status: SHIPPED | OUTPATIENT
Start: 2024-03-18

## 2024-03-18 RX ORDER — VENLAFAXINE HYDROCHLORIDE 37.5 MG/1
37.5 CAPSULE, EXTENDED RELEASE ORAL DAILY
Qty: 90 CAPSULE | Refills: 3 | Status: SHIPPED | OUTPATIENT
Start: 2024-03-18 | End: 2025-03-18

## 2024-03-18 NOTE — ASSESSMENT & PLAN NOTE
Last MRI L spine on 10/1/19 with Dr. Jimenez.  Had rizotomy in 1/2022, back has been feeling good.

## 2024-03-18 NOTE — PROGRESS NOTES
[facility] Note    Chief Complaint      Chief Complaint   Patient presents with    Follow-up       History of Present Illness      Kenyon Hunt is a 83 y.o. male who presents today for Annual preventative visit.  Patient comes to appointment with spouse.  Cards: [facility]: Cj, Neuro: Channing    Balance has been better since last visit, not dizzy like he was.    Patient is a nonsmoker.  Patient reports wearing seatbelt when riding in a car.  Patient denies falls since last visit and does use (walker) a mobility aid.      Problem List Items Addressed This Visit       Anxiety    Current Assessment & Plan     Stable on effexor 37.5 mg, Lorazepam PRN (takes 1/2 tablet BID).  No SI/HI. Panic attacks have been better since starting Effexor.         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  No CP/SOB.  Sees Dr. Calle.         Chronic bilateral back pain    Current Assessment & Plan     Last MRI L spine on 10/1/19 with Dr. Jimenez.  Had rizotomy in 1/2022, back has been feeling good.         Essential hypertension    Current Assessment & Plan     Stable on irbesartan 150 mg daily and metoprolol 25 mg BID, no CP/SOB/HA.          Gastroesophageal reflux disease without esophagitis    Current Assessment & Plan     Stable on protonix, sees Dr. Lowery.  No issues at present         History of CVA (cerebrovascular accident)    Current Assessment & Plan     After 2/2020, residual dizziness and optho issues, on ASA and lipitor every other day.         History of prostate cancer    Current Assessment & Plan     On Flomax.  No issues at present.         Hyperlipidemia    Current Assessment & Plan     Stable on lipitor, no myalgias  The ASCVD Risk score ([place] DK, et al.,  2019) failed to calculate for the following reasons:    The 2019 ASCVD risk score is only valid for ages 40 to 79          Relevant Medications    atorvastatin (LIPITOR) 10 MG tablet    Other Relevant Orders    CBC Auto Differential    Lipid Panel    Comprehensive Metabolic Panel    Parkinsonism    Current Assessment & Plan     Sees Dr. Snell, on Sinemet BID.  Feels like is shuffling less and walking better.  Has dizziness with AM dose on occasion.         Unsteady gait    Current Assessment & Plan     Seeing neuro, using walker, still feels unsteady.          Other Visit Diagnoses       Annual physical exam    -  Primary    Encounter for long-term (current) use of medications        Relevant Orders    Hemoglobin A1C    Sensory neuropathy        Relevant Medications    venlafaxine (EFFEXOR-XR) 37.5 MG 24 hr capsule                Health Maintenance   Topic Date Due    TETANUS VACCINE  Never done    Shingles Vaccine (2 of 2) 04/13/2018    Lipid Panel  09/09/2027    Aspirin/Antiplatelet Therapy  Discontinued       Past Medical History:   Diagnosis Date    Strabismus        Past Surgical History:   Procedure Laterality Date    CATARACT EXTRACTION W/  INTRAOCULAR LENS IMPLANT Bilateral     pace maker      SHOULDER SURGERY         family history is not on file.    Social History     Tobacco Use    Smoking status: Former    Smokeless tobacco: Never   Substance Use Topics    Alcohol use: Yes       Review of Systems   Constitutional:  Negative for chills and fever.   Respiratory:  Negative for cough and shortness of breath.    Cardiovascular:  Negative for chest pain and palpitations.   Gastrointestinal:  Negative for constipation, diarrhea, nausea and vomiting.   Genitourinary:  Negative for dysuria and hematuria.   Musculoskeletal:  Negative for falls.   Neurological:  Negative for headaches.        Outpatient Encounter Medications as of 3/18/2024   Medication Sig Dispense Refill    aspirin (ECOTRIN) 81 MG EC tablet  Take 1 tablet (81 mg total) by mouth once daily. 90 tablet 3    calcium carbonate (OS-FELICITY) 600 mg calcium (1,500 mg) Tab Take 600 mg by mouth 2 (two) times a day.      carbidopa-levodopa  mg (SINEMET)  mg per tablet Take 2 tablets by mouth 3 (three) times daily. 180 tablet 11    fluticasone propionate (FLONASE) 50 mcg/actuation nasal spray 1 spray (50 mcg total) by Each Nostril route once daily. 32 g 11    irbesartan (AVAPRO) 150 MG tablet Take 150 mg by mouth once daily.      LORazepam (ATIVAN) 0.5 MG tablet TAKE 1 TABLET(0.5 MG) BY MOUTH EVERY 8 HOURS AS NEEDED FOR ANXIETY 90 tablet 1    meclizine (ANTIVERT) 25 mg tablet Take 1 tablet (25 mg total) by mouth 3 (three) times daily as needed for Dizziness. 60 tablet 1    melatonin 5 mg Cap Take 1 capsule by mouth every evening.      metoprolol tartrate (LOPRESSOR) 25 MG tablet Take 25 mg by mouth 2 (two) times a day.      [DISCONTINUED] atorvastatin (LIPITOR) 10 MG tablet Take 1 tablet (10 mg total) by mouth every other day. 45 tablet 3    [DISCONTINUED] tamsulosin (FLOMAX) 0.4 mg Cap TAKE 1 CAPSULE(0.4 MG) BY MOUTH EVERY DAY 90 capsule 3    [DISCONTINUED] venlafaxine (EFFEXOR-XR) 37.5 MG 24 hr capsule Take 1 capsule (37.5 mg total) by mouth once daily. 30 capsule 11    atorvastatin (LIPITOR) 10 MG tablet Take 1 tablet (10 mg total) by mouth every other day. 45 tablet 3    tamsulosin (FLOMAX) 0.4 mg Cap Take 1 capsule (0.4 mg total) by mouth once daily. 90 capsule 3    venlafaxine (EFFEXOR-XR) 37.5 MG 24 hr capsule Take 1 capsule (37.5 mg total) by mouth once daily. 90 capsule 3    [DISCONTINUED] LORazepam (ATIVAN) 0.5 MG tablet Take 1 tablet (0.5 mg total) by mouth every 8 (eight) hours as needed for Anxiety. 90 tablet 5     No facility-administered encounter medications on file as of 3/18/2024.        Review of patient's allergies indicates:   Allergen Reactions    Carbamazepine Rash       Physical Exam      Vital Signs  Pulse: 84  SpO2: 96 %  BP:  "122/72  Pain Score: 0-No pain  Height and Weight  Height: 5' 5" (165.1 cm)  Weight: 75.6 kg (166 lb 10.7 oz)  BSA (Calculated - sq m): 1.86 sq meters  BMI (Calculated): 27.7  Weight in (lb) to have BMI = 25: 149.9]    Physical Exam  Constitutional:       Appearance: He is well-developed.   HENT:      Head: Normocephalic and atraumatic.   Neck:      Thyroid: No thyromegaly.   Cardiovascular:      Rate and Rhythm: Normal rate and regular rhythm.      Heart sounds: No murmur heard.  Pulmonary:      Effort: Pulmonary effort is normal. No respiratory distress.      Breath sounds: Normal breath sounds.   Abdominal:      General: There is no distension.      Palpations: Abdomen is soft.      Tenderness: There is no abdominal tenderness.   Skin:     General: Skin is warm and dry.   Neurological:      Mental Status: He is alert and oriented to person, place, and time.   Psychiatric:         Behavior: Behavior normal.          Laboratory:  CBC:  No results for input(s): "WBC", "RBC", "HGB", "HCT", "PLT", "MCV", "MCH", "MCHC" in the last 2160 hours.      CMP:  No results for input(s): "GLU", "CALCIUM", "ALBUMIN", "PROT", "NA", "K", "CO2", "CL", "BUN", "ALKPHOS", "ALT", "AST", "BILITOT" in the last 2160 hours.    Invalid input(s): "CREATININ"      URINALYSIS:  No results for input(s): "COLORU", "CLARITYU", "SPECGRAV", "PHUR", "PROTEINUA", "GLUCOSEU", "BILIRUBINCON", "BLOODU", "WBCU", "RBCU", "BACTERIA", "MUCUS", "NITRITE", "LEUKOCYTESUR", "UROBILINOGEN", "HYALINECASTS" in the last 2160 hours.     LIPIDS:  No results for input(s): "TSH", "HDL", "CHOL", "TRIG", "LDLCALC", "CHOLHDL", "NONHDLCHOL", "TOTALCHOLEST" in the last 2160 hours.    TSH:  No results for input(s): "TSH" in the last 2160 hours.    A1C:  No results for input(s): "HGBA1C" in the last 2160 hours.    Radiology:  No results found in the last 30 days.     Assessment/Plan     Kenyon Hunt is a 83 y.o.male with:    1. Annual physical exam    2. History of CVA " (cerebrovascular accident)    3. Parkinsonism, unspecified Parkinsonism type    4. Anxiety  - tamsulosin (FLOMAX) 0.4 mg Cap; Take 1 capsule (0.4 mg total) by mouth once daily.  Dispense: 90 capsule; Refill: 3    5. Essential hypertension    6. 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 Mixed hyperlipidemia  - CBC Auto Differential; Future  - Lipid Panel; Future  - Comprehensive Metabolic Panel; Future  - atorvastatin (LIPITOR) 10 MG tablet; Take 1 tablet (10 mg total) by mouth every other day.  Dispense: 45 tablet; Refill: 3  - CBC Auto Differential  - Lipid Panel  - Comprehensive Metabolic Panel    8. History of prostate cancer    9. Gastroesophageal reflux disease without esophagitis    10. Unsteady gait    11. Atherosclerosis of aorta    12. Encounter for long-term (current) use of medications  - Hemoglobin A1C; Future  - Hemoglobin A1C    13. Sensory neuropathy  - venlafaxine (EFFEXOR-XR) 37.5 MG 24 hr capsule; Take 1 capsule (37.5 mg total) by mouth once daily.  Dispense: 90 capsule; Refill: 3    14. Chronic bilateral low back pain without sciatica        -counseled on RSV and Shingrix vaccines, declines  -counseled on increasing exercise  -Continue current medications and maintain follow up with specialists.    -Follow up in about 6 months (around 9/18/2024) for follow up of medical problems.       Italia Ramírez MD  [facility]

## 2024-03-26 ENCOUNTER — TELEPHONE (OUTPATIENT)
Dept: PRIMARY CARE CLINIC | Facility: CLINIC | Age: 84
End: 2024-03-26
Payer: MEDICARE

## 2024-03-26 NOTE — TELEPHONE ENCOUNTER
----- Message from Moises Lee sent at 3/26/2024  2:36 PM CDT -----  Contact: [phone]  1MEDICALADVICE     Patient is calling for Medical Advice regarding: Pt has questions about quest    How long has patient had these symptoms:    Pharmacy name and phone#:    Would like response via [employer]:  call back     Comments:

## 2024-04-02 ENCOUNTER — LAB VISIT (OUTPATIENT)
Dept: LAB | Facility: HOSPITAL | Age: 84
End: 2024-04-02
Attending: INTERNAL MEDICINE
Payer: MEDICARE

## 2024-04-02 ENCOUNTER — TELEPHONE (OUTPATIENT)
Dept: PRIMARY CARE CLINIC | Facility: CLINIC | Age: 84
End: 2024-04-02
Payer: MEDICARE

## 2024-04-02 DIAGNOSIS — E78.2 MIXED HYPERLIPIDEMIA: Primary | ICD-10-CM

## 2024-04-02 DIAGNOSIS — E78.2 MIXED HYPERLIPIDEMIA: ICD-10-CM

## 2024-04-02 DIAGNOSIS — Z79.899 ENCOUNTER FOR LONG-TERM (CURRENT) USE OF MEDICATIONS: ICD-10-CM

## 2024-04-02 LAB
ALBUMIN SERPL BCP-MCNC: 3.4 G/DL (ref 3.5–5.2)
ALP SERPL-CCNC: 57 U/L (ref 55–135)
ALT SERPL W/O P-5'-P-CCNC: 11 U/L (ref 10–44)
ANION GAP SERPL CALC-SCNC: 8 MMOL/L (ref 8–16)
AST SERPL-CCNC: 16 U/L (ref 10–40)
BASOPHILS # BLD AUTO: 0.04 K/UL (ref 0–0.2)
BASOPHILS NFR BLD: 0.5 % (ref 0–1.9)
BILIRUB SERPL-MCNC: 0.4 MG/DL (ref 0.1–1)
BUN SERPL-MCNC: 20 MG/DL (ref 8–23)
CALCIUM SERPL-MCNC: 10 MG/DL (ref 8.7–10.5)
CHLORIDE SERPL-SCNC: 106 MMOL/L (ref 95–110)
CHOLEST SERPL-MCNC: 132 MG/DL (ref 120–199)
CHOLEST/HDLC SERPL: 2.7 {RATIO} (ref 2–5)
CO2 SERPL-SCNC: 26 MMOL/L (ref 23–29)
CREAT SERPL-MCNC: 0.9 MG/DL (ref 0.5–1.4)
DIFFERENTIAL METHOD BLD: ABNORMAL
EOSINOPHIL # BLD AUTO: 0.8 K/UL (ref 0–0.5)
EOSINOPHIL NFR BLD: 9.8 % (ref 0–8)
ERYTHROCYTE [DISTWIDTH] IN BLOOD BY AUTOMATED COUNT: 13.8 % (ref 11.5–14.5)
EST. GFR  (NO RACE VARIABLE): >60 ML/MIN/1.73 M^2
ESTIMATED AVG GLUCOSE: 111 MG/DL (ref 68–131)
GLUCOSE SERPL-MCNC: 94 MG/DL (ref 70–110)
HBA1C MFR BLD: 5.5 % (ref 4–5.6)
HCT VFR BLD AUTO: 39.8 % (ref 40–54)
HDLC SERPL-MCNC: 49 MG/DL (ref 40–75)
HDLC SERPL: 37.1 % (ref 20–50)
HGB BLD-MCNC: 13.1 G/DL (ref 14–18)
IMM GRANULOCYTES # BLD AUTO: 0.02 K/UL (ref 0–0.04)
IMM GRANULOCYTES NFR BLD AUTO: 0.2 % (ref 0–0.5)
LDLC SERPL CALC-MCNC: 69.2 MG/DL (ref 63–159)
LYMPHOCYTES # BLD AUTO: 1.7 K/UL (ref 1–4.8)
LYMPHOCYTES NFR BLD: 21.1 % (ref 18–48)
MCH RBC QN AUTO: 30.8 PG (ref 27–31)
MCHC RBC AUTO-ENTMCNC: 32.9 G/DL (ref 32–36)
MCV RBC AUTO: 94 FL (ref 82–98)
MONOCYTES # BLD AUTO: 0.6 K/UL (ref 0.3–1)
MONOCYTES NFR BLD: 7.1 % (ref 4–15)
NEUTROPHILS # BLD AUTO: 4.9 K/UL (ref 1.8–7.7)
NEUTROPHILS NFR BLD: 61.3 % (ref 38–73)
NONHDLC SERPL-MCNC: 83 MG/DL
NRBC BLD-RTO: 0 /100 WBC
PLATELET # BLD AUTO: 217 K/UL (ref 150–450)
PMV BLD AUTO: 10.4 FL (ref 9.2–12.9)
POTASSIUM SERPL-SCNC: 4.4 MMOL/L (ref 3.5–5.1)
PROT SERPL-MCNC: 6.5 G/DL (ref 6–8.4)
RBC # BLD AUTO: 4.25 M/UL (ref 4.6–6.2)
SODIUM SERPL-SCNC: 140 MMOL/L (ref 136–145)
TRIGL SERPL-MCNC: 69 MG/DL (ref 30–150)
WBC # BLD AUTO: 8.06 K/UL (ref 3.9–12.7)

## 2024-04-02 PROCEDURE — 85025 COMPLETE CBC W/AUTO DIFF WBC: CPT | Performed by: INTERNAL MEDICINE

## 2024-04-02 PROCEDURE — 36415 COLL VENOUS BLD VENIPUNCTURE: CPT | Performed by: INTERNAL MEDICINE

## 2024-04-02 PROCEDURE — 80053 COMPREHEN METABOLIC PANEL: CPT | Performed by: INTERNAL MEDICINE

## 2024-04-02 PROCEDURE — 80061 LIPID PANEL: CPT | Performed by: INTERNAL MEDICINE

## 2024-04-02 PROCEDURE — 83036 HEMOGLOBIN GLYCOSYLATED A1C: CPT | Performed by: INTERNAL MEDICINE

## 2024-04-02 NOTE — TELEPHONE ENCOUNTER
Labs were sent to [email or web address], asking for them to be sent to [facility] instead. Pended what was originally ordered.

## 2024-04-15 ENCOUNTER — TELEPHONE (OUTPATIENT)
Dept: PRIMARY CARE CLINIC | Facility: CLINIC | Age: 84
End: 2024-04-15
Payer: MEDICARE

## 2024-04-15 NOTE — TELEPHONE ENCOUNTER
----- Message from Princess Atkinson sent at 4/15/2024  3:07 PM CDT -----  Contact: Self/ [phone]   2TESTRESULTS    Type: Test Results    What test was performed?Labs     Who ordered the test?Dr Ramírez     When and where were the test performed? 4/2 [facility]     Would you like response via [employer]:No, Return call     Comments: pt received his lab results on the portal and would like a return call about his lab results that are showing low. Please advise

## 2024-05-16 DIAGNOSIS — F41.9 ANXIETY: ICD-10-CM

## 2024-05-16 RX ORDER — MEMANTINE HYDROCHLORIDE 5 MG/1
5 TABLET ORAL 2 TIMES DAILY
Qty: 180 TABLET | Refills: 3 | Status: SHIPPED | OUTPATIENT
Start: 2024-05-16

## 2024-05-16 RX ORDER — LORAZEPAM 0.5 MG/1
TABLET ORAL
Qty: 90 TABLET | Refills: 1 | Status: SHIPPED | OUTPATIENT
Start: 2024-05-16

## 2024-05-16 NOTE — TELEPHONE ENCOUNTER
No care due was identified.  [facility] Embedded Care Due Messages. Reference number: [id number].   5/16/2024 11:30:50 AM CDT

## 2024-05-22 ENCOUNTER — PATIENT MESSAGE (OUTPATIENT)
Dept: NEUROLOGY | Facility: CLINIC | Age: 84
End: 2024-05-22
Payer: MEDICARE

## 2024-07-18 DIAGNOSIS — F41.9 ANXIETY: ICD-10-CM

## 2024-07-18 RX ORDER — LORAZEPAM 0.5 MG/1
TABLET ORAL
Qty: 90 TABLET | Refills: 0 | Status: SHIPPED | OUTPATIENT
Start: 2024-07-18

## 2024-07-18 NOTE — TELEPHONE ENCOUNTER
----- Message from Mamta GARCIA Zafar sent at 7/18/2024 11:32 AM CDT -----  Contact: [phone]    Requesting an RX refill or new RX.    Is this a refill or new RX: Refill 1    RX name and strength (copy/paste from chart):  LORazepam (ATIVAN) 1 MG tablet    Is this a 30 day or 90 day RX:    Pharmacy name and phone # (copy/paste from chart):  [id number] - [place], [place] - [address]  Phone: [phone]  Fax: [phone]          The doctors have asked that we provide their patients with the following 2 reminders -- prescription refills can take up to 72 hours, and a friendly reminder that in the future you can use your [facility] account to request refills:      Pharmacy needs referral. Thank you.

## 2024-07-18 NOTE — TELEPHONE ENCOUNTER
No care due was identified.  [facility] Embedded Care Due Messages. Reference number: [id number].   7/18/2024 11:17:48 AM CDT

## 2024-08-19 DIAGNOSIS — F41.9 ANXIETY: ICD-10-CM

## 2024-08-19 RX ORDER — LORAZEPAM 0.5 MG/1
TABLET ORAL
Qty: 90 TABLET | Refills: 0 | Status: SHIPPED | OUTPATIENT
Start: 2024-08-19

## 2024-08-19 NOTE — TELEPHONE ENCOUNTER
No care due was identified.  [facility] Embedded Care Due Messages. Reference number: [id number].   8/19/2024 10:04:11 AM CDT

## 2024-09-17 DIAGNOSIS — F41.9 ANXIETY: ICD-10-CM

## 2024-09-17 NOTE — TELEPHONE ENCOUNTER
No care due was identified.  [facility] Embedded Care Due Messages. Reference number: [id number].   9/17/2024 4:43:01 PM CDT

## 2024-09-18 RX ORDER — LORAZEPAM 0.5 MG/1
TABLET ORAL
Qty: 90 TABLET | Refills: 0 | Status: SHIPPED | OUTPATIENT
Start: 2024-09-18

## 2024-09-18 NOTE — TELEPHONE ENCOUNTER
----- Message from Maria Elena Raymundo sent at 9/18/2024  1:27 PM CDT -----  Contact: [phone]  1MEDICALADVICE     Patient is calling for Medical Advice regarding: Patient is requesting a call back from staff regarding appt.     How long has patient had these symptoms: n/a    Pharmacy name and phone#:    Patient wants a call back or thru [facility]: call back     Comments:    Please advise patient replies from provider may take up to 48 hours.

## 2024-09-18 NOTE — TELEPHONE ENCOUNTER
----- Message from Maria Elena Raymundo sent at 9/18/2024  1:27 PM CDT -----  Contact: [phone]  1MEDICALADVICE     Patient is calling for Medical Advice regarding: Patient is requesting a call back from staff regarding appt.     How long has patient had these symptoms: n/a    Pharmacy name and phone#:    Patient wants a call back or thru [facility]: call back     Comments:    Please advise patient replies from provider may take up to 48 hours.

## 2024-09-18 NOTE — TELEPHONE ENCOUNTER
----- Message from Kimberly Blum sent at 9/18/2024  3:38 PM CDT -----  Contact: [phone]@patient  Requesting an RX refill or new RX.LORazepam (ATIVAN) 0.5 MG tablet    Is this a refill or new RX: refill     RX name and strength (copy/paste from chart):      Is this a 30 day or 90 day RX:     Pharmacy name and phone # [id number] - [place], [place] - [address]        The doctors have asked that we provide their patients with the following 2 reminders -- prescription refills can take up to 72 hours, and a friendly reminder that in the future you can use your [facility] account to request refills:

## 2024-09-18 NOTE — TELEPHONE ENCOUNTER
Needs to be rescheduled , cleopatra states at least one of therm has to be 30 min for patient and wife.

## 2024-10-18 DIAGNOSIS — F41.9 ANXIETY: ICD-10-CM

## 2024-10-18 RX ORDER — LORAZEPAM 0.5 MG/1
TABLET ORAL
Qty: 90 TABLET | Refills: 3 | Status: SHIPPED | OUTPATIENT
Start: 2024-10-18

## 2024-10-18 NOTE — TELEPHONE ENCOUNTER
No care due was identified.  [facility] Embedded Care Due Messages. Reference number: [id number].   10/18/2024 10:38:41 AM CDT

## 2024-11-18 ENCOUNTER — OFFICE VISIT (OUTPATIENT)
Dept: PRIMARY CARE CLINIC | Facility: CLINIC | Age: 84
End: 2024-11-18
Payer: MEDICARE

## 2024-11-18 VITALS
WEIGHT: 162.69 LBS | DIASTOLIC BLOOD PRESSURE: 72 MMHG | SYSTOLIC BLOOD PRESSURE: 130 MMHG | HEIGHT: 65 IN | BODY MASS INDEX: 27.1 KG/M2 | HEART RATE: 82 BPM | OXYGEN SATURATION: 95 %

## 2024-11-18 DIAGNOSIS — Z86.73 HISTORY OF CVA (CEREBROVASCULAR ACCIDENT): ICD-10-CM

## 2024-11-18 DIAGNOSIS — Z23 NEED FOR INFLUENZA VACCINATION: Primary | ICD-10-CM

## 2024-11-18 DIAGNOSIS — F41.9 ANXIETY: ICD-10-CM

## 2024-11-18 DIAGNOSIS — I25.708: ICD-10-CM

## 2024-11-18 DIAGNOSIS — I10 ESSENTIAL HYPERTENSION: ICD-10-CM

## 2024-11-18 DIAGNOSIS — E78.2 MIXED HYPERLIPIDEMIA: ICD-10-CM

## 2024-11-18 DIAGNOSIS — Z85.46 HISTORY OF PROSTATE CANCER: ICD-10-CM

## 2024-11-18 DIAGNOSIS — Z79.899 ENCOUNTER FOR LONG-TERM (CURRENT) USE OF MEDICATIONS: ICD-10-CM

## 2024-11-18 DIAGNOSIS — K21.9 GASTROESOPHAGEAL REFLUX DISEASE WITHOUT ESOPHAGITIS: ICD-10-CM

## 2024-11-18 DIAGNOSIS — R26.81 UNSTEADY GAIT: ICD-10-CM

## 2024-11-18 DIAGNOSIS — G20.C PARKINSONISM, UNSPECIFIED PARKINSONISM TYPE: ICD-10-CM

## 2024-11-18 PROCEDURE — 99999 PR PBB SHADOW E&M-EST. PATIENT-LVL III: CPT | Mod: PBBFAC,,, | Performed by: INTERNAL MEDICINE

## 2024-11-18 PROCEDURE — 99214 OFFICE O/P EST MOD 30 MIN: CPT | Mod: S$GLB,,, | Performed by: INTERNAL MEDICINE

## 2024-11-18 PROCEDURE — 1159F MED LIST DOCD IN RCRD: CPT | Mod: CPTII,S$GLB,, | Performed by: INTERNAL MEDICINE

## 2024-11-18 PROCEDURE — 1126F AMNT PAIN NOTED NONE PRSNT: CPT | Mod: CPTII,S$GLB,, | Performed by: INTERNAL MEDICINE

## 2024-11-18 RX ORDER — CARBIDOPA AND LEVODOPA 25; 100 MG/1; MG/1
2 TABLET ORAL 3 TIMES DAILY
Qty: 180 TABLET | Refills: 11 | Status: SHIPPED | OUTPATIENT
Start: 2024-11-18 | End: 2025-11-18

## 2024-11-18 NOTE — PROGRESS NOTES
[facility] Note    Chief Complaint      Chief Complaint   Patient presents with    Follow-up       History of Present Illness      Kenyon Hunt is a 84 y.o. male who presents today for f/u HTN.  Patient comes to appointment with spouse.  Cards: [facility]: Cj, Neuro: Channing     Problem List Items Addressed This Visit       Anxiety    Current Assessment & Plan     Stable on effexor 37.5 mg, Lorazepam PRN (takes 1/2 tablet BID).  No SI/HI. Panic attacks have been better since starting Effexor.         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  No CP/SOB.  Sees Dr. Calle.         Essential hypertension    Current Assessment & Plan     Stable on irbesartan 150 mg daily and metoprolol 25 mg BID, no CP/SOB/HA.          Gastroesophageal reflux disease without esophagitis    Current Assessment & Plan     Stable on protonix, sees Dr. Lowery.  No issues at present         History of CVA (cerebrovascular accident)    Current Assessment & Plan     After 2/2020, residual dizziness and optho issues, on ASA and lipitor every other day.         History of prostate cancer    Current Assessment & Plan     On Flomax.  No issues at present.         Hyperlipidemia    Current Assessment & Plan     Stable on lipitor, no myalgias  The ASCVD Risk score ([place] DK, et al., 2019) failed to calculate for the following reasons:    The 2019 ASCVD risk score is only valid for ages 40 to 79          Relevant Orders    CBC Auto Differential    Lipid Panel    Comprehensive Metabolic Panel    Parkinsonism    Current Assessment & Plan     Sees Dr. Snell, on Sinemet BID.  Feels like is shuffling less and walking better.  Has dizziness with AM dose on occasion.     On Namenda for memory.         Unsteady gait    Current Assessment & Plan     Seeing neuro, using walker,  still feels unsteady. Has not been exercising          Other Visit Diagnoses       Need for influenza vaccination    -  Primary    Encounter for long-term (current) use of medications        Relevant Orders    Hemoglobin A1C                  Health Maintenance   Topic Date Due    TETANUS VACCINE  Never done    Shingles Vaccine (2 of 2) 04/13/2018    Lipid Panel  04/02/2029    Aspirin/Antiplatelet Therapy  Discontinued       Past Medical History:   Diagnosis Date    Strabismus        Past Surgical History:   Procedure Laterality Date    CATARACT EXTRACTION W/  INTRAOCULAR LENS IMPLANT Bilateral     pace maker      SHOULDER SURGERY         family history is not on file.    Social History     Tobacco Use    Smoking status: Former    Smokeless tobacco: Never   Substance Use Topics    Alcohol use: Yes       Review of Systems   Constitutional:  Negative for chills and fever.   Respiratory:  Negative for cough and shortness of breath.    Cardiovascular:  Negative for chest pain and palpitations.   Gastrointestinal:  Negative for constipation, diarrhea, nausea and vomiting.   Genitourinary:  Negative for dysuria and hematuria.   Musculoskeletal:  Negative for falls.   Neurological:  Negative for headaches.        Outpatient Encounter Medications as of 11/18/2024   Medication Sig Dispense Refill    aspirin (ECOTRIN) 81 MG EC tablet Take 1 tablet (81 mg total) by mouth once daily. 90 tablet 3    atorvastatin (LIPITOR) 10 MG tablet Take 1 tablet (10 mg total) by mouth every other day. 45 tablet 3    calcium carbonate (OS-FELICITY) 600 mg calcium (1,500 mg) Tab Take 600 mg by mouth 2 (two) times a day.      fluticasone propionate (FLONASE) 50 mcg/actuation nasal spray 1 spray (50 mcg total) by Each Nostril route once daily. 32 g 11    irbesartan (AVAPRO) 150 MG tablet Take 150 mg by mouth once daily.      LORazepam (ATIVAN) 0.5 MG tablet TAKE 1 TABLET(0.5 MG) BY MOUTH EVERY 8 HOURS AS NEEDED FOR ANXIETY 90 tablet 3    melatonin 5 mg  "Cap Take 1 capsule by mouth every evening.      memantine (NAMENDA) 5 MG Tab TAKE 1 TABLET(5 MG) BY MOUTH TWICE DAILY 180 tablet 3    metoprolol tartrate (LOPRESSOR) 25 MG tablet Take 25 mg by mouth 2 (two) times a day.      tamsulosin (FLOMAX) 0.4 mg Cap Take 1 capsule (0.4 mg total) by mouth once daily. 90 capsule 3    venlafaxine (EFFEXOR-XR) 37.5 MG 24 hr capsule Take 1 capsule (37.5 mg total) by mouth once daily. 90 capsule 3    [DISCONTINUED] carbidopa-levodopa  mg (SINEMET)  mg per tablet Take 2 tablets by mouth 3 (three) times daily. 180 tablet 11    carbidopa-levodopa  mg (SINEMET)  mg per tablet Take 2 tablets by mouth 3 (three) times daily. 180 tablet 11    [DISCONTINUED] meclizine (ANTIVERT) 25 mg tablet Take 1 tablet (25 mg total) by mouth 3 (three) times daily as needed for Dizziness. (Patient not taking: Reported on 11/18/2024) 60 tablet 1    [DISCONTINUED] RSVPreF3 antigen-AS01E, PF, (AREXVY, PF,) 120 mcg/0.5 mL SusR vaccine Inject into the muscle. (Patient not taking: Reported on 11/18/2024) 1 each 0     No facility-administered encounter medications on file as of 11/18/2024.        Review of patient's allergies indicates:   Allergen Reactions    Carbamazepine Rash       Physical Exam      Vital Signs  Pulse: 82  SpO2: 95 %  BP: 130/72  Pain Score: 0-No pain  Height and Weight  Height: 5' 5" (165.1 cm)  Weight: 73.8 kg (162 lb 11.2 oz)  BSA (Calculated - sq m): 1.84 sq meters  BMI (Calculated): 27.1  Weight in (lb) to have BMI = 25: 149.9]    Physical Exam  Constitutional:       Appearance: He is well-developed.   HENT:      Head: Normocephalic and atraumatic.   Neck:      Thyroid: No thyromegaly.   Cardiovascular:      Rate and Rhythm: Normal rate and regular rhythm.      Heart sounds: No murmur heard.  Pulmonary:      Effort: Pulmonary effort is normal. No respiratory distress.      Breath sounds: Normal breath sounds.   Abdominal:      General: There is no distension.      " "Palpations: Abdomen is soft.      Tenderness: There is no abdominal tenderness.   Skin:     General: Skin is warm and dry.   Neurological:      Mental Status: He is alert and oriented to person, place, and time.   Psychiatric:         Behavior: Behavior normal.          Laboratory:  CBC:  No results for input(s): "WBC", "RBC", "HGB", "HCT", "PLT", "MCV", "MCH", "MCHC" in the last 2160 hours.      CMP:  No results for input(s): "GLU", "CALCIUM", "ALBUMIN", "PROT", "NA", "K", "CO2", "CL", "BUN", "ALKPHOS", "ALT", "AST", "BILITOT" in the last 2160 hours.    Invalid input(s): "CREATININ"      URINALYSIS:  No results for input(s): "COLORU", "CLARITYU", "SPECGRAV", "PHUR", "PROTEINUA", "GLUCOSEU", "BILIRUBINCON", "BLOODU", "WBCU", "RBCU", "BACTERIA", "MUCUS", "NITRITE", "LEUKOCYTESUR", "UROBILINOGEN", "HYALINECASTS" in the last 2160 hours.     LIPIDS:  No results for input(s): "TSH", "HDL", "CHOL", "TRIG", "LDLCALC", "CHOLHDL", "NONHDLCHOL", "TOTALCHOLEST" in the last 2160 hours.    TSH:  No results for input(s): "TSH" in the last 2160 hours.    A1C:  No results for input(s): "HGBA1C" in the last 2160 hours.    Radiology:  No results found in the last 30 days.     Assessment/Plan     Kenyon Hunt is a 84 y.o.male with:    1. Need for influenza vaccination    2. History of CVA (cerebrovascular accident)    3. Parkinsonism, unspecified Parkinsonism type    4. Anxiety    5. Essential hypertension    6. 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 Mixed hyperlipidemia  - CBC Auto Differential; Future  - Lipid Panel; Future  - Comprehensive Metabolic Panel; Future    8. History of prostate cancer    9. Gastroesophageal reflux disease without esophagitis    10. Unsteady gait    11. Encounter for long-term (current) use of medications  - Hemoglobin A1C; Future          -counseled on Shingrix vaccines  -counseled on increasing " exercise  -Continue current medications and maintain follow up with specialists.    -Follow up in about 6 months (around 5/18/2025) for follow up of medical problems.       Italia Ramírez MD  [facility]

## 2024-11-18 NOTE — ASSESSMENT & PLAN NOTE
Sees Dr. Snell, on Sinemet BID.  Feels like is shuffling less and walking better.  Has dizziness with AM dose on occasion.     On Namenda for memory.

## 2024-12-23 ENCOUNTER — TELEPHONE (OUTPATIENT)
Dept: PRIMARY CARE CLINIC | Facility: CLINIC | Age: 84
End: 2024-12-23
Payer: MEDICARE

## 2024-12-23 NOTE — TELEPHONE ENCOUNTER
----- Message from Rubi sent at 12/23/2024  2:33 PM CST -----  Contact: Jocelyn @ [phone] or   .1MEDICALADVICE     Patient is calling for Medical Advice regarding:  Symptoms: Cough, Chest Congestion, Body Aches  Outcome: Schedule an appointment to be seen within 24 hours.  Reason: Caller denied all higher acuity questions    The caller rejected this outcome.    How long has patient had these symptoms:    Pharmacy name and phone#:    Patient wants a call back or thru [facility]:call     Comments:Per Caller need a call today from a nurse     Please advise patient replies from provider may take up to 48 hours.

## 2025-01-14 DIAGNOSIS — Z00.00 ENCOUNTER FOR MEDICARE ANNUAL WELLNESS EXAM: ICD-10-CM

## 2025-02-19 RX ORDER — MEMANTINE HYDROCHLORIDE 5 MG/1
5 TABLET ORAL 2 TIMES DAILY
Qty: 180 TABLET | Refills: 3 | Status: SHIPPED | OUTPATIENT
Start: 2025-02-19

## 2025-02-21 DIAGNOSIS — F41.9 ANXIETY: ICD-10-CM

## 2025-02-21 NOTE — TELEPHONE ENCOUNTER
----- Message from Amena sent at 2/21/2025  2:48 PM CST -----  Contact: [phone]  Prescription refill request.RX name and strength (copy/paste from chart):   LORazepam (ATIVAN) 0.5 MG tabletIs this a 30 day or 90 day RX:  30 Local pharmacy or mail order pharmacy:  localPharmacy name and phone # (copy/paste from chart):   [id number] - [place] - [address] AT [facility] [address] [address]Uapgv: [phone] Fax: [phone] information:   Pharmacy requesting a refill for pt.

## 2025-02-21 NOTE — TELEPHONE ENCOUNTER
----- Message from Reyna sent at 2/21/2025  4:02 PM CST -----  Requesting an RX refill or new RX.Is this a refill or new RX: RX name and strength (copy/paste from chart):  [email or web address] this a 30 day or 90 day RX: Pharmacy name and phone # (copy/paste from chart):  [id number] - [place] - [address] AT [facility] [address] [address]: [phone] Fax: [phone] doctors have asked that we provide their patients with the following 2 reminders -- prescription refills can take up to 72 hours, and a friendly reminder that in the future you can use your [facility] account to request refills: Comments: pt is at [email or web address] and they have called twice. Pt is worried that he will not get his medication. Please call and advise.

## 2025-02-21 NOTE — TELEPHONE ENCOUNTER
Care Due:                  Date            Visit Type   Department     Provider  --------------------------------------------------------------------------------                                EP -                              PRIMARY      [facility]  Last Visit: 11-      CARE ([facility])   FOSTER Ramírez                              EP -                              PRIMARY      [facility]  Next Visit: 05-      CARE ([facility])   FOSTER Ramírez                                                            Last  Test          Frequency    Reason                     Performed    Due Date  --------------------------------------------------------------------------------    CMP.........  12 months..  atorvastatin, venlafaxine  04- 03-    Lipid Panel.  12 months..  atorvastatin.............  04- 03-    [facility] Embedded Care Due Messages. Reference number: [id number].   2/21/2025 3:09:42 PM CST

## 2025-02-24 RX ORDER — LORAZEPAM 0.5 MG/1
0.5 TABLET ORAL EVERY 8 HOURS PRN
Qty: 90 TABLET | Refills: 3 | Status: SHIPPED | OUTPATIENT
Start: 2025-02-24

## 2025-03-13 ENCOUNTER — TELEPHONE (OUTPATIENT)
Facility: CLINIC | Age: 85
End: 2025-03-13
Payer: MEDICARE

## 2025-04-04 ENCOUNTER — PATIENT MESSAGE (OUTPATIENT)
Facility: CLINIC | Age: 85
End: 2025-04-04
Payer: MEDICARE

## 2025-05-04 ENCOUNTER — TELEPHONE (OUTPATIENT)
Dept: HOME HEALTH SERVICES | Facility: CLINIC | Age: 85
End: 2025-05-04
Payer: MEDICARE

## 2025-05-05 ENCOUNTER — TELEPHONE (OUTPATIENT)
Dept: ADMINISTRATIVE | Facility: CLINIC | Age: 85
End: 2025-05-05
Payer: MEDICARE

## 2025-05-13 DIAGNOSIS — E78.2 MIXED HYPERLIPIDEMIA: ICD-10-CM

## 2025-05-13 DIAGNOSIS — F41.9 ANXIETY: ICD-10-CM

## 2025-05-13 RX ORDER — TAMSULOSIN HYDROCHLORIDE 0.4 MG/1
0.4 CAPSULE ORAL DAILY
Qty: 90 CAPSULE | Refills: 3 | Status: SHIPPED | OUTPATIENT
Start: 2025-05-13

## 2025-05-13 RX ORDER — ATORVASTATIN CALCIUM 10 MG/1
10 TABLET, FILM COATED ORAL EVERY OTHER DAY
Qty: 45 TABLET | Refills: 3 | Status: SHIPPED | OUTPATIENT
Start: 2025-05-13

## 2025-05-13 NOTE — TELEPHONE ENCOUNTER
Care Due:                  Date            Visit Type   Department     Provider  --------------------------------------------------------------------------------                                EP -                              PRIMARY      [facility]  Last Visit: 11-      CARE ([facility])   FOSTER Ramírez                              EP -                              PRIMARY      [facility] PR[facility]  Next Visit: 05-      CARE ([facility])   FOSTER Ramírez                                                            Last  Test          Frequency    Reason                     Performed    Due Date  --------------------------------------------------------------------------------    CMP.........  12 months..  atorvastatin, venlafaxine  04- 03-    Lipid Panel.  12 months..  atorvastatin.............  04- 03-    [facility] Embedded Care Due Messages. Reference number: [id number].   5/13/2025 1:29:18 PM CDT

## 2025-05-16 ENCOUNTER — OFFICE VISIT (OUTPATIENT)
Dept: PRIMARY CARE CLINIC | Facility: CLINIC | Age: 85
End: 2025-05-16
Payer: MEDICARE

## 2025-05-16 ENCOUNTER — PATIENT MESSAGE (OUTPATIENT)
Facility: CLINIC | Age: 85
End: 2025-05-16
Payer: MEDICARE

## 2025-05-16 VITALS
BODY MASS INDEX: 27.29 KG/M2 | OXYGEN SATURATION: 96 % | WEIGHT: 163.81 LBS | SYSTOLIC BLOOD PRESSURE: 174 MMHG | HEART RATE: 69 BPM | DIASTOLIC BLOOD PRESSURE: 84 MMHG | RESPIRATION RATE: 18 BRPM | HEIGHT: 65 IN

## 2025-05-16 DIAGNOSIS — F03.90 DEMENTIA, UNSPECIFIED DEMENTIA SEVERITY, UNSPECIFIED DEMENTIA TYPE, UNSPECIFIED WHETHER BEHAVIORAL, PSYCHOTIC, OR MOOD DISTURBANCE OR ANXIETY: ICD-10-CM

## 2025-05-16 DIAGNOSIS — G20.C PARKINSONISM, UNSPECIFIED PARKINSONISM TYPE: Primary | ICD-10-CM

## 2025-05-16 DIAGNOSIS — Z86.73 HISTORY OF CVA (CEREBROVASCULAR ACCIDENT): ICD-10-CM

## 2025-05-16 DIAGNOSIS — I10 ESSENTIAL HYPERTENSION: ICD-10-CM

## 2025-05-16 DIAGNOSIS — R26.81 UNSTEADY GAIT: ICD-10-CM

## 2025-05-16 DIAGNOSIS — K21.9 GASTROESOPHAGEAL REFLUX DISEASE WITHOUT ESOPHAGITIS: ICD-10-CM

## 2025-05-16 DIAGNOSIS — E78.2 MIXED HYPERLIPIDEMIA: ICD-10-CM

## 2025-05-16 DIAGNOSIS — I70.0 ATHEROSCLEROSIS OF AORTA: ICD-10-CM

## 2025-05-16 DIAGNOSIS — Z79.899 ENCOUNTER FOR LONG-TERM (CURRENT) USE OF MEDICATIONS: ICD-10-CM

## 2025-05-16 DIAGNOSIS — F41.9 ANXIETY: ICD-10-CM

## 2025-05-16 DIAGNOSIS — Z85.46 HISTORY OF PROSTATE CANCER: ICD-10-CM

## 2025-05-16 DIAGNOSIS — I25.708: ICD-10-CM

## 2025-05-16 PROCEDURE — 99999 PR PBB SHADOW E&M-EST. PATIENT-LVL III: CPT | Mod: PBBFAC,,, | Performed by: INTERNAL MEDICINE

## 2025-05-16 RX ORDER — VENLAFAXINE HYDROCHLORIDE 75 MG/1
75 CAPSULE, EXTENDED RELEASE ORAL DAILY
COMMUNITY
Start: 2025-05-12

## 2025-05-16 RX ORDER — LABETALOL 200 MG/1
200 TABLET, FILM COATED ORAL 2 TIMES DAILY
COMMUNITY

## 2025-05-16 NOTE — PROGRESS NOTES
[facility] Note    Chief Complaint      Chief Complaint   Patient presents with    Follow-up     6m       History of Present Illness      Kenyon Hunt is a 84 y.o. male who presents today for f/u HTN.  Patient comes to appointment with spouse.  Cards: Alva, Ortho: Cj, Neuro: Gladis Snell; Pratik      Problem List Items Addressed This Visit       Anxiety    Current Assessment & Plan   Stable on effexor 75 mg, Lorazepam PRN (takes 1/2 tablet BID).  No SI/HI. Panic attacks have been better since starting Effexor. Sees Dr. Noyola.         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  No CP/SOB.  Sees Dr. Calle.         Dementia, unspecified dementia severity, unspecified dementia type, unspecified whether behavioral, psychotic, or mood disturbance or anxiety    Current Assessment & Plan   Seeing Dr. Noyola and neuro Dr. Snell.  On namenda.         Essential hypertension    Current Assessment & Plan   Stable on irbesartan 150 mg daily and metoprolol 25 mg BID, no CP/SOB/HA.          Gastroesophageal reflux disease without esophagitis    Current Assessment & Plan   No longer on protonix, takes Tums a few times per week, sees Dr. Lowery.  No issues at present         History of CVA (cerebrovascular accident)    Current Assessment & Plan   After 2/2020, residual dizziness and optho issues, on ASA and lipitor every other day.         History of prostate cancer    Current Assessment & Plan   On Flomax.  No issues at present. Getting bladder botox with Dr. Christie soon for incontinence.         Hyperlipidemia    Current Assessment & Plan   Stable on lipitor, no myalgias  The ASCVD Risk score (Georgiana DK, et al., 2019) failed to calculate for the following reasons:    The 2019 ASCVD risk score is only valid for ages 40 to 79          Relevant Orders     Comprehensive Metabolic Panel    Lipid Panel    CBC Auto Differential    Parkinsonism - Primary    Current Assessment & Plan   Sees Dr. Snell, on Sinemet BID.  Feels like is shuffling less and walking better.  Has dizziness with AM dose on occasion.     On Namenda for memory.         Unsteady gait    Current Assessment & Plan   Seeing neuro, using walker, still feels unsteady. Has not been exercising          Other Visit Diagnoses         Encounter for long-term (current) use of medications        Relevant Orders    Hemoglobin A1C                    Health Maintenance   Topic Date Due    TETANUS VACCINE  Never done    Shingles Vaccine (2 of 2) 04/13/2018    COVID-19 Vaccine (3 - 2024-25 season) 09/01/2024    Lipid Panel  04/02/2029    Influenza Vaccine  Completed    RSV Vaccine (Age 60+ and Pregnant patients)  Completed    Pneumococcal Vaccines (Age 50+)  Completed       Past Medical History:   Diagnosis Date    Strabismus        Past Surgical History:   Procedure Laterality Date    CATARACT EXTRACTION W/  INTRAOCULAR LENS IMPLANT Bilateral     pace maker      SHOULDER SURGERY         family history is not on file.    Social History     Tobacco Use    Smoking status: Former    Smokeless tobacco: Never   Substance Use Topics    Alcohol use: Yes       Review of Systems   Constitutional:  Negative for chills and fever.   Respiratory:  Negative for cough and shortness of breath.    Cardiovascular:  Negative for chest pain and palpitations.   Gastrointestinal:  Negative for constipation, diarrhea, nausea and vomiting.   Genitourinary:  Negative for dysuria and hematuria.   Musculoskeletal:  Negative for falls.   Neurological:  Negative for headaches.        Outpatient Encounter Medications as of 5/16/2025   Medication Sig Dispense Refill    aspirin (ECOTRIN) 81 MG EC tablet Take 1 tablet (81 mg total) by mouth once daily. 90 tablet 3    atorvastatin (LIPITOR) 10 MG tablet Take 1 tablet (10 mg total) by mouth every  "other day. 45 tablet 3    calcium carbonate (OS-FELICITY) 600 mg calcium (1,500 mg) Tab Take 600 mg by mouth 2 (two) times a day.      carbidopa-levodopa  mg (SINEMET)  mg per tablet Take 2 tablets by mouth 3 (three) times daily. 180 tablet 11    fluticasone propionate (FLONASE) 50 mcg/actuation nasal spray 1 spray (50 mcg total) by Each Nostril route once daily. 32 g 11    irbesartan (AVAPRO) 150 MG tablet Take 150 mg by mouth once daily.      labetaloL (NORMODYNE) 200 MG tablet Take 200 mg by mouth 2 (two) times daily.      LORazepam (ATIVAN) 0.5 MG tablet Take 1 tablet (0.5 mg total) by mouth every 8 (eight) hours as needed for Anxiety. 90 tablet 3    melatonin 5 mg Cap Take 1 capsule by mouth every evening.      memantine (NAMENDA) 5 MG Tab TAKE 1 TABLET(5 MG) BY MOUTH TWICE DAILY 180 tablet 3    tamsulosin (FLOMAX) 0.4 mg Cap Take 1 capsule (0.4 mg total) by mouth once daily. 90 capsule 3    venlafaxine (EFFEXOR-XR) 75 MG 24 hr capsule Take 75 mg by mouth once daily.      [DISCONTINUED] metoprolol tartrate (LOPRESSOR) 25 MG tablet Take 25 mg by mouth 2 (two) times a day.      [DISCONTINUED] atorvastatin (LIPITOR) 10 MG tablet Take 1 tablet (10 mg total) by mouth every other day. 45 tablet 3    [DISCONTINUED] tamsulosin (FLOMAX) 0.4 mg Cap Take 1 capsule (0.4 mg total) by mouth once daily. 90 capsule 3    [DISCONTINUED] venlafaxine (EFFEXOR-XR) 37.5 MG 24 hr capsule Take 1 capsule (37.5 mg total) by mouth once daily. (Patient not taking: Reported on 5/16/2025) 90 capsule 3     No facility-administered encounter medications on file as of 5/16/2025.        Review of patient's allergies indicates:   Allergen Reactions    Carbamazepine Rash       Physical Exam      Vital Signs  Pulse: 69  Resp: 18  SpO2: 96 %  BP: (!) 174/84  BP Location: Right arm  Patient Position: Sitting  Pain Score: 0-No pain  Height and Weight  Height: 5' 5" (165.1 cm)  Weight: 74.3 kg (163 lb 12.8 oz)  BSA (Calculated - sq m): 1.85 sq " "meters  BMI (Calculated): 27.3  Weight in (lb) to have BMI = 25: 149.9]    Physical Exam  Constitutional:       Appearance: He is well-developed.   HENT:      Head: Normocephalic and atraumatic.   Neck:      Thyroid: No thyromegaly.   Cardiovascular:      Rate and Rhythm: Normal rate and regular rhythm.      Heart sounds: No murmur heard.  Pulmonary:      Effort: Pulmonary effort is normal. No respiratory distress.      Breath sounds: Normal breath sounds.   Abdominal:      General: There is no distension.      Palpations: Abdomen is soft.      Tenderness: There is no abdominal tenderness.   Skin:     General: Skin is warm and dry.   Neurological:      Mental Status: He is alert and oriented to person, place, and time.   Psychiatric:         Behavior: Behavior normal.          Laboratory:  CBC:  No results for input(s): "WBC", "RBC", "HGB", "HCT", "PLT", "MCV", "MCH", "MCHC" in the last 2160 hours.      CMP:  No results for input(s): "GLU", "CALCIUM", "ALBUMIN", "PROT", "NA", "K", "CO2", "CL", "BUN", "ALKPHOS", "ALT", "AST", "BILITOT" in the last 2160 hours.    Invalid input(s): "CREATININ"      URINALYSIS:  No results for input(s): "COLORU", "CLARITYU", "SPECGRAV", "PHUR", "PROTEINUA", "GLUCOSEU", "BILIRUBINCON", "BLOODU", "WBCU", "RBCU", "BACTERIA", "MUCUS", "NITRITE", "LEUKOCYTESUR", "UROBILINOGEN", "HYALINECASTS" in the last 2160 hours.     LIPIDS:  No results for input(s): "TSH", "HDL", "CHOL", "TRIG", "LDLCALC", "CHOLHDL", "NONHDLCHOL", "TOTALCHOLEST" in the last 2160 hours.    TSH:  No results for input(s): "TSH" in the last 2160 hours.    A1C:  No results for input(s): "HGBA1C" in the last 2160 hours.    Radiology:  No results found in the last 30 days.     Assessment/Plan     Kenyon Hunt is a 84 y.o.male with:    1. Parkinsonism, unspecified Parkinsonism type    2. Anxiety    3. 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 History of CVA (cerebrovascular accident)    5. Essential hypertension    6. History of prostate cancer    7. Dementia, unspecified dementia severity, unspecified dementia type, unspecified whether behavioral, psychotic, or mood disturbance or anxiety    8. Unsteady gait    9. Gastroesophageal reflux disease without esophagitis    10. Encounter for long-term (current) use of medications  - Hemoglobin A1C; Future    11. Mixed hyperlipidemia  - Comprehensive Metabolic Panel; Future  - Lipid Panel; Future  - CBC Auto Differential; Future          -increase labetalol to 200 mg BID  -counseled on vaccines  -Continue current medications and maintain follow up with specialists.    -Follow up in about 6 months (around 11/16/2025) for follow up of medical problems.       Italia Ramírez MD  [facility]

## 2025-05-16 NOTE — ASSESSMENT & PLAN NOTE
Sees Dr. Snell, on Sinemet BID.  Feels like is shuffling less and walking better.  Has dizziness with AM dose on occasion.     On Namenda for memory.   Carac Pregnancy And Lactation Text: This medication is Pregnancy Category X and contraindicated in pregnancy and in women who may become pregnant. It is unknown if this medication is excreted in breast milk.

## 2025-05-16 NOTE — ASSESSMENT & PLAN NOTE
Stable on effexor 75 mg, Lorazepam PRN (takes 1/2 tablet BID).  No SI/HI. Panic attacks have been better since starting Effexor. Sees Dr. Noyola.

## 2025-05-19 ENCOUNTER — LAB VISIT (OUTPATIENT)
Dept: LAB | Facility: HOSPITAL | Age: 85
End: 2025-05-19
Attending: INTERNAL MEDICINE
Payer: MEDICARE

## 2025-05-19 DIAGNOSIS — E78.2 MIXED HYPERLIPIDEMIA: ICD-10-CM

## 2025-05-19 DIAGNOSIS — Z79.899 ENCOUNTER FOR LONG-TERM (CURRENT) USE OF MEDICATIONS: ICD-10-CM

## 2025-05-19 LAB
ABSOLUTE EOSINOPHIL (OHS): 0.66 K/UL
ABSOLUTE MONOCYTE (OHS): 0.68 K/UL (ref 0.3–1)
ABSOLUTE NEUTROPHIL COUNT (OHS): 6.08 K/UL (ref 1.8–7.7)
ALBUMIN SERPL BCP-MCNC: 3.6 G/DL (ref 3.5–5.2)
ALP SERPL-CCNC: 65 UNIT/L (ref 40–150)
ALT SERPL W/O P-5'-P-CCNC: 19 UNIT/L (ref 10–44)
ANION GAP (OHS): 10 MMOL/L (ref 8–16)
AST SERPL-CCNC: 18 UNIT/L (ref 11–45)
BASOPHILS # BLD AUTO: 0.07 K/UL
BASOPHILS NFR BLD AUTO: 0.7 %
BILIRUB SERPL-MCNC: 0.4 MG/DL (ref 0.1–1)
BUN SERPL-MCNC: 17 MG/DL (ref 8–23)
CALCIUM SERPL-MCNC: 9.7 MG/DL (ref 8.7–10.5)
CHLORIDE SERPL-SCNC: 105 MMOL/L (ref 95–110)
CHOLEST SERPL-MCNC: 153 MG/DL (ref 120–199)
CHOLEST/HDLC SERPL: 2.7 {RATIO} (ref 2–5)
CO2 SERPL-SCNC: 25 MMOL/L (ref 23–29)
CREAT SERPL-MCNC: 0.9 MG/DL (ref 0.5–1.4)
EAG (OHS): 114 MG/DL (ref 68–131)
ERYTHROCYTE [DISTWIDTH] IN BLOOD BY AUTOMATED COUNT: 13.6 % (ref 11.5–14.5)
GFR SERPLBLD CREATININE-BSD FMLA CKD-EPI: >60 ML/MIN/1.73/M2
GLUCOSE SERPL-MCNC: 94 MG/DL (ref 70–110)
HBA1C MFR BLD: 5.6 % (ref 4–5.6)
HCT VFR BLD AUTO: 39.6 % (ref 40–54)
HDLC SERPL-MCNC: 57 MG/DL (ref 40–75)
HDLC SERPL: 37.3 % (ref 20–50)
HGB BLD-MCNC: 13 GM/DL (ref 14–18)
IMM GRANULOCYTES # BLD AUTO: 0.03 K/UL (ref 0–0.04)
IMM GRANULOCYTES NFR BLD AUTO: 0.3 % (ref 0–0.5)
LDLC SERPL CALC-MCNC: 76 MG/DL (ref 63–159)
LYMPHOCYTES # BLD AUTO: 1.95 K/UL (ref 1–4.8)
MCH RBC QN AUTO: 30.4 PG (ref 27–31)
MCHC RBC AUTO-ENTMCNC: 32.8 G/DL (ref 32–36)
MCV RBC AUTO: 93 FL (ref 82–98)
NONHDLC SERPL-MCNC: 96 MG/DL
NUCLEATED RBC (/100WBC) (OHS): 0 /100 WBC
PLATELET # BLD AUTO: 201 K/UL (ref 150–450)
PMV BLD AUTO: 10.5 FL (ref 9.2–12.9)
POTASSIUM SERPL-SCNC: 4.3 MMOL/L (ref 3.5–5.1)
PROT SERPL-MCNC: 6.8 GM/DL (ref 6–8.4)
RBC # BLD AUTO: 4.27 M/UL (ref 4.6–6.2)
RELATIVE EOSINOPHIL (OHS): 7 %
RELATIVE LYMPHOCYTE (OHS): 20.6 % (ref 18–48)
RELATIVE MONOCYTE (OHS): 7.2 % (ref 4–15)
RELATIVE NEUTROPHIL (OHS): 64.2 % (ref 38–73)
SODIUM SERPL-SCNC: 140 MMOL/L (ref 136–145)
TRIGL SERPL-MCNC: 100 MG/DL (ref 30–150)
WBC # BLD AUTO: 9.47 K/UL (ref 3.9–12.7)

## 2025-05-19 PROCEDURE — 83036 HEMOGLOBIN GLYCOSYLATED A1C: CPT

## 2025-05-19 PROCEDURE — 84478 ASSAY OF TRIGLYCERIDES: CPT

## 2025-05-19 PROCEDURE — 85025 COMPLETE CBC W/AUTO DIFF WBC: CPT

## 2025-05-19 PROCEDURE — 82374 ASSAY BLOOD CARBON DIOXIDE: CPT

## 2025-05-19 PROCEDURE — 36415 COLL VENOUS BLD VENIPUNCTURE: CPT

## 2025-05-26 ENCOUNTER — RESULTS FOLLOW-UP (OUTPATIENT)
Dept: PRIMARY CARE CLINIC | Facility: CLINIC | Age: 85
End: 2025-05-26

## 2025-05-29 ENCOUNTER — TELEPHONE (OUTPATIENT)
Dept: PRIMARY CARE CLINIC | Facility: CLINIC | Age: 85
End: 2025-05-29
Payer: MEDICARE

## 2025-05-29 NOTE — TELEPHONE ENCOUNTER
----- Message from Darrell sent at 5/29/2025  3:20 PM CDT -----  Regarding: Returning Call for Veronica  Contact: Pt [phone]  Type: Returning a callWho left a message? Veronica Ng MAWhen did the practice call? TodayDoes patient know what this is regarding: Test ResultsWould the patient rather a call back or a response via [facility]? CallComments:

## 2025-05-29 NOTE — TELEPHONE ENCOUNTER
----- Message from Lynne sent at 5/29/2025  3:03 PM CDT -----  Contact: [phone]  2TESTRESULTSType: Test ResultsWhat test was performed? labTonya ordered the test?Alesia and where were the test performed? 5/19/25Would you like a call back and or thru [facility]:callComments:Please call with rsults

## 2025-06-09 ENCOUNTER — OFFICE VISIT (OUTPATIENT)
Facility: CLINIC | Age: 85
End: 2025-06-09
Payer: MEDICARE

## 2025-06-09 ENCOUNTER — LAB VISIT (OUTPATIENT)
Dept: LAB | Facility: HOSPITAL | Age: 85
End: 2025-06-09
Payer: MEDICARE

## 2025-06-09 VITALS
BODY MASS INDEX: 26.63 KG/M2 | HEART RATE: 73 BPM | DIASTOLIC BLOOD PRESSURE: 73 MMHG | SYSTOLIC BLOOD PRESSURE: 149 MMHG | HEIGHT: 64 IN | WEIGHT: 156 LBS

## 2025-06-09 DIAGNOSIS — G62.9 SENSORY NEUROPATHY: Primary | ICD-10-CM

## 2025-06-09 DIAGNOSIS — R26.81 UNSTEADY GAIT: ICD-10-CM

## 2025-06-09 DIAGNOSIS — G20.A1 PARKINSON'S DISEASE WITHOUT DYSKINESIA, UNSPECIFIED WHETHER MANIFESTATIONS FLUCTUATE: ICD-10-CM

## 2025-06-09 DIAGNOSIS — G62.9 SENSORY NEUROPATHY: ICD-10-CM

## 2025-06-09 LAB
FOLATE SERPL-MCNC: 18 NG/ML (ref 4–24)
VIT B12 SERPL-MCNC: >2000 PG/ML (ref 210–950)

## 2025-06-09 PROCEDURE — 84165 PROTEIN E-PHORESIS SERUM: CPT

## 2025-06-09 PROCEDURE — 84425 ASSAY OF VITAMIN B-1: CPT

## 2025-06-09 PROCEDURE — 99999 PR PBB SHADOW E&M-EST. PATIENT-LVL IV: CPT | Mod: PBBFAC,,, | Performed by: PSYCHIATRY & NEUROLOGY

## 2025-06-09 PROCEDURE — 82746 ASSAY OF FOLIC ACID SERUM: CPT

## 2025-06-09 PROCEDURE — 36415 COLL VENOUS BLD VENIPUNCTURE: CPT

## 2025-06-09 PROCEDURE — 1159F MED LIST DOCD IN RCRD: CPT | Mod: CPTII,S$GLB,, | Performed by: PSYCHIATRY & NEUROLOGY

## 2025-06-09 PROCEDURE — G2211 COMPLEX E/M VISIT ADD ON: HCPCS | Mod: S$GLB,,, | Performed by: PSYCHIATRY & NEUROLOGY

## 2025-06-09 PROCEDURE — 83521 IG LIGHT CHAINS FREE EACH: CPT

## 2025-06-09 PROCEDURE — 80321 ALCOHOLS BIOMARKERS 1OR 2: CPT

## 2025-06-09 PROCEDURE — 99214 OFFICE O/P EST MOD 30 MIN: CPT | Mod: S$GLB,,, | Performed by: PSYCHIATRY & NEUROLOGY

## 2025-06-09 PROCEDURE — 82607 VITAMIN B-12: CPT

## 2025-06-09 PROCEDURE — 86334 IMMUNOFIX E-PHORESIS SERUM: CPT

## 2025-06-09 PROCEDURE — 84207 ASSAY OF VITAMIN B-6: CPT

## 2025-06-09 RX ORDER — CARBIDOPA AND LEVODOPA 25; 100 MG/1; MG/1
1 TABLET ORAL 4 TIMES DAILY
Qty: 120 TABLET | Refills: 11 | Status: SHIPPED | OUTPATIENT
Start: 2025-06-09 | End: 2026-06-09

## 2025-06-09 RX ORDER — ASCORBIC ACID 250 MG
TABLET,CHEWABLE ORAL
COMMUNITY

## 2025-06-09 NOTE — PROGRESS NOTES
[facility] - NEUROLOGY 7TH [place] [facility], [place]    Date: 6/9/25  Patient Name: Kenyon Hunt   MRN: [id number]   Referring Provider: No ref. provider found    Thank you so much No ref. provider found for your patient referral to Neuromuscular team at [facility]. We take pride in our care coordination and look forward to your feedback and questions.    Assessment:   Kenyon Hunt is a 84 y.o. male is a very pleasant patient with multiple neurological issues impairing his mobility with no more pain including sensory predominant polyneuropathy, parkinsonism, dizziness.  I discussed about increasing the frequency are Sinemet to help with mobility.  Family will keep a diary of his symptoms especially falls and mobility.  Further adjustment will be done in future.  I will complete the workup for peripheral neuropathy.    I discussed about healthy lifestyle and exercise/physical therapy for continued improvement in his condition and to prevent falls.  Follow-up in clinic 3 months. I would wish him very best for improvement/recovery in his condition.    Future direction based on feedback:    Plan:     Problem List Items Addressed This Visit       Unsteady gait    Parkinsonism    Relevant Medications    carbidopa-levodopa  mg (SINEMET)  mg per tablet    Other Relevant Orders    Ambulatory referral/consult to Sleep Disorders    Ambulatory Referral/Consult to Physical Therapy     Other Visit Diagnoses         Sensory neuropathy    -  Primary    Relevant Orders    Vitamin B1    Vitamin B6    Vitamin B12    Folate    IMMUNOFIXATION ELECTROPHORESIS, SERUM    IMMUNOGLOBULIN FREE LT CHAINS BLOOD    PROTEIN ELECTROPHORESIS, SERUM    Phosphatidylethanol (PETH)          Oneyda Holbrook MD    This evaluation was completed in >45  Minutes over 50% of the time spent on education & counseling. This includes face to face time and non-face to face time preparing to see  the patient (eg, review of tests), obtaining and/or reviewing separately obtained history, documenting clinical information in the electronic or other health record, independently interpreting results and communicating results to the patient/family/caregiver, or care coordinator.    Patient note was created using MModal Dictation.  Any errors in syntax or even information may not have been identified and edited on initial review prior to signing this note.    Details provided by:    Patient  Family- Daughter (Franklyn)    Interval history on 6/9/25     History of Present Illness  The patient presents for evaluation of impaired mobility. He is accompanied by his daughter.    He reports experiencing a sensation of pressure in his forehead, described as a dullness that originates from the back and extends to the front. This sensation is particularly pronounced when standing, to the point where he feels unable to rise from a seated position. He does not experience any associated nausea or vomiting. His daughter notes that his gait has deteriorated, with him struggling to move his feet appropriately.  He is using Rollator walker for routine mobility.  She also mentions that he requires assistance with tasks such as putting on his socks and shoes. He has not yet transitioned to using a wheelchair.  He mentioned no pain in his back or lower extremities.    He has been prescribed carbidopa-levodopa, which he takes once in the mid-morning, once in the evening, and once at night. However, he reports that this medication induces nausea. He has previously used CBD but has since discontinued its use.    INTERVAL: Since the last visit two years ago, his mobility has worsened, and he now struggles significantly with walking.    PAST SURGICAL HISTORY: He underwent surgery on his throat to address snoring.    Physical Exam  Motor Examination    Strength: Strength in lower extremities is 5/5 bilaterally.  With relatively impaired deep  tendon reflexes in ankles but relatively brisk in knees.    Sensory Examination    Light Touch, Vibration and Proprioception: Decreased vibration sensation in the lower extremities up to knees.  Impaired pinprick up to knee as well    Gait and Station    Gait: Slow gait with assistance.  He has great difficulty in turning around the corner and take multiple steps to turn around.  Small short steps with feet relatively sticking to floor.    No increased tone in upper extremities on maneuvering.     Interval work up:  Hemoglobin A1c 5.6  CBC with MCV 93  Basic metabolic panel normal  COVID positive on 12/23/2024.    D-dimer positive    Interval history on 8/8/23   The detail was confirmed with the patient and his daughter who mentioned no more falls after the adjustment of the medication especially cutting down and stopping on Lexapro and bupropion.  His fatigue is still there.  As family tried to stop Ativan his anxiety and panic attack worsened but I guided them to use it as needed for the future.  He is able to walk but still feels imbalance.  Currently getting physical therapy 2 times per week.  RN will be visiting his home once per week.  He mentioned right arm pins and needles.    Interval workup:   MRI cervical spine on 07/28/2023   No cervical cord signal abnormality. Disc bulging with endplate osteophyte formation at C3-4 through C6-7 but no evidence of overt cord impingement.  Mild to moderate degrees of foraminal stenosis bilaterally.    MRI brain on 07/28/2023   No acute intracranial process.  Similar appearance of volume loss and chronic white matter changes.  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  The brain is otherwise overall stable in appearance.    History of Present Illness:  06/29/2023     Mr. Kenyon Hunt is a 84 y.o. male presenting for evaluation of  left arm numbness and tingling.     The detail was confirmed with the patient who mentioned  numbness and tingling in the lateral side of hand and fingers for more than 1 year which has been progressively worsening over time.  He has also noted feeling of swelling in the legs when actually there is no swelling.  He has some confusion in January 2023.  Feels that he has been falling more than usual for past 1 week which he initially noticed in February 2020.  There is history of urine incontinence since February 2020.  He usually gets up in the night to go to the restroom. He tends to fall backwards.      He has medication adjustment but still there is no confirmed diagnosis for his condition.  He mentioned some memory issues where he forgets what he said with predominant short term memory issues for the past few months.      He has been getting physical therapy for the past 6 weeks for stumbling and falls.  He also mentioned right rotator cuff surgery in the past. He has epidural in the past.  He used to play baseball in [place].      There is no history of smoking and he does not drink alcohol.  He had habit of heavy drinking for a few years in his 70s. He lives with his wife who is the primary caretaker for him.  They would like to have home health services by [facility]. His daughter works in [place] as medical assistant in center for clotting and bleeding disorders.    Chart Review:  Recent ED visit on 06/25/2023.    82-year-old gentleman with past medical history including Parkinson's disease presents for evaluation of altered mental status.  Patient reports that he has a pins and needle sensation in his extremities.  Initially reports that it was only in the right arm.  During the examination, he then stated that he had it also in the right leg from the toes to the knee.  He then developed it in the left ankle.  He then developed it in the left hand.  He said this has been ongoing for some time.   Daughter is at bedside and she reports the reason for the emergency department visit is that this  afternoon another sibling called him and he seemed confused.  He was unable to answer their questions.  They do not report that he had any slurred speech.  This occurred at approximately 1:45 p.m..  Approximately 15 minutes later they were at his house and they report that he seemed normal.    Pertinent work up based on chart review for current condition:   high   Comprehensive metabolic panel normal   TSH 3.46   Creatinine 1.0 normal   Vitamin E 1649 normal   RPR normal   Serum lead normal   Vitamin B6 --20 normal   Vitamin B1 --110 normal   Vitamin B12-- 442 normal   CK 84 normal     2/2022 EMG without evidence of neuropathy per patient.  No records found in the chart.      CT scan head on 06/25/2023.    No CT findings of large territorial infarction.  Additional evaluation with MRI of the brain, as clinically warranted    MRI brain and MRA neck on 05/02/2022.    Moderate chronic microvascular ischemic change and cerebral volume loss.   Prominent perivascular spaces throughout bilateral basal ganglia.   No evidence of acute infarct or hemorrhage.   MR angiogram of the head and neck appears within normal limits, allowing for some artifact.  No evidence of high-grade stenosis or large vessel occlusion    Last CBC Results:   Lab Results   Component Value Date    WBC 9.47 05/19/2025    HGB 13.0 (L) 05/19/2025    HCT 39.6 (L) 05/19/2025     05/19/2025       Last CMP Results  Lab Results   Component Value Date     05/19/2025    K 4.3 05/19/2025     05/19/2025    CO2 25 05/19/2025    BUN 17 05/19/2025    CREATININE 0.9 05/19/2025    CALCIUM 9.7 05/19/2025    ALBUMIN 3.6 05/19/2025    AST 18 05/19/2025    ALT 19 05/19/2025       Last Lipids  Lab Results   Component Value Date    CHOL 153 05/19/2025    TRIG 100 05/19/2025    HDL 57 05/19/2025    LDLCALC 76.0 05/19/2025       Last A1C  Lab Results   Component Value Date    HGBA1C 5.6 05/19/2025       Last TSH  Lab Results   Component Value Date     TSH 3.46 09/09/2022       MRI 06/09/2025    Review of Systems:  12 system review of systems is negative except for the symptoms mentioned in HPI.       PAST MEDICAL HISTORY:  Past Medical History:   Diagnosis Date    Strabismus        PAST SURGICAL HISTORY:  Past Surgical History:   Procedure Laterality Date    CATARACT EXTRACTION W/  INTRAOCULAR LENS IMPLANT Bilateral     pace maker      SHOULDER SURGERY         CURRENT MEDS:  I have reconciled the patient's home medications and discharge medications with the patient/family. I have updated all changes.  Refer to After-Visit Medication List.    Current Outpatient Medications   Medication Sig Dispense Refill    aspirin (ECOTRIN) 81 MG EC tablet Take 1 tablet (81 mg total) by mouth once daily. 90 tablet 3    atorvastatin (LIPITOR) 10 MG tablet Take 1 tablet (10 mg total) by mouth every other day. 45 tablet 3    calcium carbonate (OS-FELICITY) 600 mg calcium (1,500 mg) Tab Take 600 mg by mouth 2 (two) times a day.      carbidopa-levodopa  mg (SINEMET)  mg per tablet Take 2 tablets by mouth 3 (three) times daily. 180 tablet 11    fluticasone propionate (FLONASE) 50 mcg/actuation nasal spray 1 spray (50 mcg total) by Each Nostril route once daily. 32 g 11    irbesartan (AVAPRO) 150 MG tablet Take 150 mg by mouth once daily.      labetaloL (NORMODYNE) 200 MG tablet Take 200 mg by mouth 2 (two) times daily.      LORazepam (ATIVAN) 0.5 MG tablet Take 1 tablet (0.5 mg total) by mouth every 8 (eight) hours as needed for Anxiety. 90 tablet 3    melatonin 5 mg Cap Take 1 capsule by mouth every evening.      memantine (NAMENDA) 5 MG Tab TAKE 1 TABLET(5 MG) BY MOUTH TWICE DAILY 180 tablet 3    tamsulosin (FLOMAX) 0.4 mg Cap Take 1 capsule (0.4 mg total) by mouth once daily. 90 capsule 3    venlafaxine (EFFEXOR-XR) 75 MG 24 hr capsule Take 75 mg by mouth once daily.       No current facility-administered medications for this visit.       ALLERGIES:  Review of patient's  allergies indicates:   Allergen Reactions    Carbamazepine Rash       FAMILY HISTORY:  Family History   Problem Relation Name Age of Onset    Amblyopia Neg Hx      Blindness Neg Hx      Cataracts Neg Hx      Glaucoma Neg Hx      Macular degeneration Neg Hx      Retinal detachment Neg Hx      Strabismus Neg Hx         SOCIAL HISTORY:  Social History     Tobacco Use    Smoking status: Former    Smokeless tobacco: Never   Substance Use Topics    Alcohol use: Yes         Objective:     There were no vitals filed for this visit.      Wt Readings from Last 3 Encounters:   05/16/25 1500 74.3 kg (163 lb 12.8 oz)   11/18/24 1506 73.8 kg (162 lb 11.2 oz)   03/18/24 1336 75.6 kg (166 lb 10.7 oz)     There is no height or weight on file to calculate BMI.       Eyes: no tearing, discharge, no erythema   ENT: moist mucous membranes of the oral cavity, nares patent    Neck: Supple, full range of motion  Cardiovascular: Warm and well perfused, pulses equal and symmetrical  Lungs: Normal work of breathing, normal chest wall excursions  Skin: No rash, lesions, or breakdown on exposed skin  Psychiatry: Mood and affect are appropriate   Extremeties: No cyanosis, clubbing or edema.    GENERAL/CONSTITUTIONAL:    -Well appearing; well nourished  -Bilateral moderate pedal edema.    -He had left little trigger finger.      HIGHER INTEGRATIVE FUNCTIONS:   -Attention & concentration: Normal   -Orientation: Oriented to person, place & time  -Memory: Normal  -Language: Normal   -Fund of Knowledge: Normal     CRANIAL NERVES:   -CN 2: Visual fields full  -CN 2,3: PERRL  -CN 3,4,6: EOMI with no nystagmus  -CN 5: Facial sensation intact bilaterally  -CN 7: Facial strength/movement intact bilaterally  -CN 8: Hearing normal bilaterally  -CN 9,10: Palate elevates symmetrically  -CN 11: Normal shoulder shrug and head turn  -CN 12: Tongue protrudes midline     MOTOR:   -Tone: normal in upper and lower extremities  -UE/LE motor:       Upper Ext Right  Left Lower Ext Right Left   Shoulder Abd 5 5 Hip flexion 4 3   Elbow flexion 5 5 Knee extension 5 5   Elbow extension 5 5 Knee flexion 5 5   Fingers abduction 5 5 Ankle dorsiflexion 5 5   Wrist extension   Ankle plantar flexion     Wrist flexion   Great toe dorsiflexion     Finger extension   Thigh adduction     Finger flexion   Thigh abduction     Thumb abduction            REFLEXES:      R L  R L   Triceps 2 2 Knee 2 2   Biceps 2 2 Ankle 2 2   BR 2 2        -Flexor plantar reflex bilaterally     SENSATION:   -Vibration sense is impaired up to knees bilaterally in lower extremities and up to elbows in upper extremities.      REFLEXES:   -2/4 upper and lower extremities bilaterally  -Flexor plantar reflex bilaterally    COORDINATION:   -FNF normal bilaterally    GAIT:   -came in wheelchair  -There is no pain on neck movements.      Scheduled Follow-up :  Future Appointments   Date Time Provider Department Center   6/9/2025 11:00 AM [facility]   11/17/2025  1:30 PM Italia Ramírez MD [facility] [place]       After Visit Medication List :     Medication List            Accurate as of June 9, 2025 10:44 AM. If you have any questions, ask your nurse or doctor.                CONTINUE taking these medications      aspirin 81 MG EC tablet  Commonly known as: ECOTRIN  Take 1 tablet (81 mg total) by mouth once daily.     atorvastatin 10 MG tablet  Commonly known as: LIPITOR  Take 1 tablet (10 mg total) by mouth every other day.     calcium carbonate 600 mg calcium (1,500 mg) Tab  Commonly known as: OS-FELICITY     carbidopa-levodopa  mg  mg per tablet  Commonly known as: SINEMET  Take 2 tablets by mouth 3 (three) times daily.     fluticasone propionate 50 mcg/actuation nasal spray  Commonly known as: FLONASE  1 spray (50 mcg total) by Each Nostril route once daily.     irbesartan 150 MG tablet  Commonly known as: AVAPRO     labetaloL 200 MG tablet  Commonly known as: NORMODYNE      LORazepam 0.5 MG tablet  Commonly known as: ATIVAN  Take 1 tablet (0.5 mg total) by mouth every 8 (eight) hours as needed for Anxiety.     melatonin 5 mg Cap     memantine 5 MG Tab  Commonly known as: NAMENDA  TAKE 1 TABLET(5 MG) BY MOUTH TWICE DAILY     tamsulosin 0.4 mg Cap  Commonly known as: FLOMAX  Take 1 capsule (0.4 mg total) by mouth once daily.     venlafaxine 75 MG 24 hr capsule  Commonly known as: EFFEXOR-XR              Signing Physician:          Oneyda Holbrook MD  , [employer] [facility] [employer]  Neurology Consultant. [facility].   [address] [address] [place] [address].

## 2025-06-09 NOTE — PATIENT INSTRUCTIONS
Walk and exercise  Fall precaution    In case of any question, plz contact through [facility].

## 2025-06-10 LAB
ALBUMIN, SPE (OHS): 3.72 G/DL (ref 3.35–5.55)
ALPHA 1 GLOB (OHS): 0.28 GM/DL (ref 0.17–0.41)
ALPHA 2 GLOB (OHS): 0.77 GM/DL (ref 0.43–0.99)
BETA GLOB (OHS): 0.68 GM/DL (ref 0.5–1.1)
GAMMA GLOBULIN (OHS): 0.85 GM/DL (ref 0.67–1.58)
KAPPA LC FREE SER-MCNC: 1.05 MG/L (ref 0.26–1.65)
KAPPA LC FREE/LAMBDA FREE SER: 2.28 MG/DL (ref 0.33–1.94)
LAMBDA LC FREE SERPL-MCNC: 2.17 MG/DL (ref 0.57–2.63)
PATHOLOGIST INTERPRETATION - IFE SERUM (OHS): NORMAL
PATHOLOGIST REVIEW - SPE (OHS): NORMAL
PROT SERPL-MCNC: 6.3 GM/DL (ref 6–8.4)

## 2025-06-11 LAB
PLPETH BLD-MCNC: <10 NG/ML
POPETH BLD-MCNC: <10 NG/ML
TOXICOLOGIST REVIEW: NORMAL

## 2025-06-13 LAB
W VITAMIN B1: 82 UG/L
W VITAMIN B6: 36 UG/L

## 2025-06-16 ENCOUNTER — TELEPHONE (OUTPATIENT)
Dept: SLEEP MEDICINE | Facility: CLINIC | Age: 85
End: 2025-06-16
Payer: MEDICARE

## 2025-06-16 NOTE — TELEPHONE ENCOUNTER
Called patient to schedule an appointment with ALEXANDRA Hendricks but there was no answer so I left an Voicemail.

## 2025-06-26 ENCOUNTER — RESULTS FOLLOW-UP (OUTPATIENT)
Facility: CLINIC | Age: 85
End: 2025-06-26

## 2025-06-30 DIAGNOSIS — F41.9 ANXIETY: ICD-10-CM

## 2025-07-01 RX ORDER — LORAZEPAM 0.5 MG/1
TABLET ORAL
Qty: 90 TABLET | Refills: 5 | Status: SHIPPED | OUTPATIENT
Start: 2025-07-01

## 2025-07-01 NOTE — TELEPHONE ENCOUNTER
No care due was identified.  [facility] Embedded Care Due Messages. Reference number: [id number].   6/30/2025 8:48:18 PM CDT

## 2025-07-23 ENCOUNTER — TELEPHONE (OUTPATIENT)
Facility: CLINIC | Age: 85
End: 2025-07-23
Payer: MEDICARE

## 2025-07-23 NOTE — TELEPHONE ENCOUNTER
Spoke to patient to schedule appointment virtual on 8/22 @ 8:30am. Patient verbalized agreement

## 2025-07-28 DIAGNOSIS — F41.9 ANXIETY: ICD-10-CM

## 2025-07-28 NOTE — TELEPHONE ENCOUNTER
Copied from CRM #[id number]. Topic: General Inquiry - Patient Advice  >> Jul 28, 2025 10:59 AM Brian wrote:  .1MEDICALADVICE     Patient is calling for Medical Advice regarding:pt is calling to see if you can give him a call as soon as possible     Patient wants a call back or thru [facility], provide patient's call back phone number:Kenyon Hunt [phone]      Comments:    Please advise patient replies from provider may take up to 48 hours.

## 2025-07-28 NOTE — TELEPHONE ENCOUNTER
Spoke with pt and advised of update. Asking if you could send out a new script with updated dosing

## 2025-07-28 NOTE — TELEPHONE ENCOUNTER
No care due was identified.  [facility] Embedded Care Due Messages. Reference number: [id number].   7/28/2025 12:01:29 PM CDT

## 2025-07-28 NOTE — TELEPHONE ENCOUNTER
Spoke with pt and c/o break through urination, having trouble holding bladder and cannot get to the bathroom quick enough with the Parkinson's. It is prohibiting his mobility especially at night, has been having issues holding his urine and starting to have accidents. Asking if there is something you can recommend ?

## 2025-07-28 NOTE — TELEPHONE ENCOUNTER
Can try taking 2 of his tamsulosin (flomax pills) once daily.    A lot of the traditional medications we use for bladder can worsen parkinsons symptoms

## 2025-07-29 RX ORDER — TAMSULOSIN HYDROCHLORIDE 0.4 MG/1
0.8 CAPSULE ORAL DAILY
Qty: 180 CAPSULE | Refills: 1 | Status: SHIPPED | OUTPATIENT
Start: 2025-07-29